# Patient Record
Sex: FEMALE | Race: WHITE | NOT HISPANIC OR LATINO | Employment: OTHER | URBAN - METROPOLITAN AREA
[De-identification: names, ages, dates, MRNs, and addresses within clinical notes are randomized per-mention and may not be internally consistent; named-entity substitution may affect disease eponyms.]

---

## 2023-09-25 LAB
ANION GAP IN SER/PLAS: 11 MMOL/L (ref 10–20)
CALCIUM (MG/DL) IN SER/PLAS: 10.1 MG/DL (ref 8.6–10.3)
CARBON DIOXIDE, TOTAL (MMOL/L) IN SER/PLAS: 27 MMOL/L (ref 21–32)
CHLORIDE (MMOL/L) IN SER/PLAS: 104 MMOL/L (ref 98–107)
CREATININE (MG/DL) IN SER/PLAS: 1.15 MG/DL (ref 0.5–1.05)
ERYTHROCYTE DISTRIBUTION WIDTH (RATIO) BY AUTOMATED COUNT: 13.1 % (ref 11.5–14.5)
ERYTHROCYTE MEAN CORPUSCULAR HEMOGLOBIN CONCENTRATION (G/DL) BY AUTOMATED: 32.1 G/DL (ref 32–36)
ERYTHROCYTE MEAN CORPUSCULAR VOLUME (FL) BY AUTOMATED COUNT: 94 FL (ref 80–100)
ERYTHROCYTES (10*6/UL) IN BLOOD BY AUTOMATED COUNT: 4.18 X10E12/L (ref 4–5.2)
GFR FEMALE: 48 ML/MIN/1.73M2
GLUCOSE (MG/DL) IN SER/PLAS: 110 MG/DL (ref 74–99)
HEMATOCRIT (%) IN BLOOD BY AUTOMATED COUNT: 39.3 % (ref 36–46)
HEMOGLOBIN (G/DL) IN BLOOD: 12.6 G/DL (ref 12–16)
LEUKOCYTES (10*3/UL) IN BLOOD BY AUTOMATED COUNT: 6.9 X10E9/L (ref 4.4–11.3)
NRBC (PER 100 WBCS) BY AUTOMATED COUNT: 0 /100 WBC (ref 0–0)
PLATELETS (10*3/UL) IN BLOOD AUTOMATED COUNT: 343 X10E9/L (ref 150–450)
POTASSIUM (MMOL/L) IN SER/PLAS: 4 MMOL/L (ref 3.5–5.3)
SODIUM (MMOL/L) IN SER/PLAS: 138 MMOL/L (ref 136–145)
UREA NITROGEN (MG/DL) IN SER/PLAS: 22 MG/DL (ref 6–23)

## 2023-10-03 ENCOUNTER — ANESTHESIA EVENT (OUTPATIENT)
Dept: OPERATING ROOM | Facility: HOSPITAL | Age: 83
End: 2023-10-03
Payer: MEDICARE

## 2023-10-03 ENCOUNTER — HOSPITAL ENCOUNTER (OUTPATIENT)
Facility: HOSPITAL | Age: 83
Setting detail: OBSERVATION
Discharge: NURSING, REHAB OR LONG-TERM CARE | End: 2023-10-07
Attending: ORTHOPAEDIC SURGERY | Admitting: ORTHOPAEDIC SURGERY
Payer: MEDICARE

## 2023-10-03 ENCOUNTER — ANESTHESIA (OUTPATIENT)
Dept: OPERATING ROOM | Facility: HOSPITAL | Age: 83
End: 2023-10-03
Payer: MEDICARE

## 2023-10-03 DIAGNOSIS — Z96.652 HISTORY OF LEFT KNEE REPLACEMENT: Primary | ICD-10-CM

## 2023-10-03 PROBLEM — M17.9 ARTHRITIS OF KNEE, DEGENERATIVE: Status: ACTIVE | Noted: 2023-10-03

## 2023-10-03 PROCEDURE — 2580000001 HC RX 258 IV SOLUTIONS: Performed by: ORTHOPAEDIC SURGERY

## 2023-10-03 PROCEDURE — A27447 PR TOTAL KNEE ARTHROPLASTY: Performed by: NURSE ANESTHETIST, CERTIFIED REGISTERED

## 2023-10-03 PROCEDURE — 2780000003 HC OR 278 NO HCPCS: Performed by: ORTHOPAEDIC SURGERY

## 2023-10-03 PROCEDURE — C1776 JOINT DEVICE (IMPLANTABLE): HCPCS | Performed by: ORTHOPAEDIC SURGERY

## 2023-10-03 PROCEDURE — 2500000004 HC RX 250 GENERAL PHARMACY W/ HCPCS (ALT 636 FOR OP/ED): Performed by: ORTHOPAEDIC SURGERY

## 2023-10-03 PROCEDURE — 2580000001 HC RX 258 IV SOLUTIONS: Performed by: NURSE ANESTHETIST, CERTIFIED REGISTERED

## 2023-10-03 PROCEDURE — 3600000010 HC OR TIME - EACH INCREMENTAL 1 MINUTE - PROCEDURE LEVEL FIVE: Performed by: ORTHOPAEDIC SURGERY

## 2023-10-03 PROCEDURE — G0378 HOSPITAL OBSERVATION PER HR: HCPCS

## 2023-10-03 PROCEDURE — 2500000004 HC RX 250 GENERAL PHARMACY W/ HCPCS (ALT 636 FOR OP/ED): Performed by: PHYSICIAN ASSISTANT

## 2023-10-03 PROCEDURE — 3700000001 HC GENERAL ANESTHESIA TIME - INITIAL BASE CHARGE: Performed by: ORTHOPAEDIC SURGERY

## 2023-10-03 PROCEDURE — A27447 PR TOTAL KNEE ARTHROPLASTY: Performed by: STUDENT IN AN ORGANIZED HEALTH CARE EDUCATION/TRAINING PROGRAM

## 2023-10-03 PROCEDURE — 2500000002 HC RX 250 W HCPCS SELF ADMINISTERED DRUGS (ALT 637 FOR MEDICARE OP, ALT 636 FOR OP/ED): Performed by: PHYSICIAN ASSISTANT

## 2023-10-03 PROCEDURE — 2500000004 HC RX 250 GENERAL PHARMACY W/ HCPCS (ALT 636 FOR OP/ED): Performed by: NURSE ANESTHETIST, CERTIFIED REGISTERED

## 2023-10-03 PROCEDURE — 2500000001 HC RX 250 WO HCPCS SELF ADMINISTERED DRUGS (ALT 637 FOR MEDICARE OP): Performed by: ORTHOPAEDIC SURGERY

## 2023-10-03 PROCEDURE — 94640 AIRWAY INHALATION TREATMENT: CPT

## 2023-10-03 PROCEDURE — 7100000001 HC RECOVERY ROOM TIME - INITIAL BASE CHARGE: Performed by: ORTHOPAEDIC SURGERY

## 2023-10-03 PROCEDURE — 2500000005 HC RX 250 GENERAL PHARMACY W/O HCPCS: Performed by: NURSE ANESTHETIST, CERTIFIED REGISTERED

## 2023-10-03 PROCEDURE — 99100 ANES PT EXTEME AGE<1 YR&>70: CPT | Performed by: STUDENT IN AN ORGANIZED HEALTH CARE EDUCATION/TRAINING PROGRAM

## 2023-10-03 PROCEDURE — 3700000002 HC GENERAL ANESTHESIA TIME - EACH INCREMENTAL 1 MINUTE: Performed by: ORTHOPAEDIC SURGERY

## 2023-10-03 PROCEDURE — 7100000002 HC RECOVERY ROOM TIME - EACH INCREMENTAL 1 MINUTE: Performed by: ORTHOPAEDIC SURGERY

## 2023-10-03 PROCEDURE — 2500000001 HC RX 250 WO HCPCS SELF ADMINISTERED DRUGS (ALT 637 FOR MEDICARE OP): Performed by: PHYSICIAN ASSISTANT

## 2023-10-03 PROCEDURE — 3600000005 HC OR TIME - INITIAL BASE CHARGE - PROCEDURE LEVEL FIVE: Performed by: ORTHOPAEDIC SURGERY

## 2023-10-03 PROCEDURE — 2720000007 HC OR 272 NO HCPCS: Performed by: ORTHOPAEDIC SURGERY

## 2023-10-03 DEVICE — TIBIAL COMPONENT
Type: IMPLANTABLE DEVICE | Site: KNEE | Status: FUNCTIONAL
Brand: TRIATHLON

## 2023-10-03 DEVICE — TIBIAL BEARING INSERT - CS
Type: IMPLANTABLE DEVICE | Site: KNEE | Status: FUNCTIONAL
Brand: TRIATHLON

## 2023-10-03 DEVICE — PATELLA
Type: IMPLANTABLE DEVICE | Site: KNEE | Status: FUNCTIONAL
Brand: TRIATHLON

## 2023-10-03 DEVICE — CRUCIATE RETAINING FEMORAL
Type: IMPLANTABLE DEVICE | Site: KNEE | Status: FUNCTIONAL
Brand: TRIATHLON

## 2023-10-03 RX ORDER — OXYCODONE HYDROCHLORIDE 5 MG/1
7.5 TABLET ORAL EVERY 6 HOURS PRN
Status: DISCONTINUED | OUTPATIENT
Start: 2023-10-03 | End: 2023-10-07 | Stop reason: HOSPADM

## 2023-10-03 RX ORDER — ALLOPURINOL 100 MG/1
100 TABLET ORAL DAILY
Status: DISCONTINUED | OUTPATIENT
Start: 2023-10-03 | End: 2023-10-07 | Stop reason: HOSPADM

## 2023-10-03 RX ORDER — FLUTICASONE FUROATE AND VILANTEROL 200; 25 UG/1; UG/1
1 POWDER RESPIRATORY (INHALATION) DAILY
Status: DISCONTINUED | OUTPATIENT
Start: 2023-10-03 | End: 2023-10-03

## 2023-10-03 RX ORDER — MECLIZINE HYDROCHLORIDE 25 MG/1
25 TABLET ORAL 3 TIMES DAILY PRN
COMMUNITY

## 2023-10-03 RX ORDER — MECLIZINE HYDROCHLORIDE 25 MG/1
25 TABLET ORAL 3 TIMES DAILY PRN
Status: DISCONTINUED | OUTPATIENT
Start: 2023-10-03 | End: 2023-10-07 | Stop reason: HOSPADM

## 2023-10-03 RX ORDER — SODIUM CHLORIDE, SODIUM LACTATE, POTASSIUM CHLORIDE, CALCIUM CHLORIDE 600; 310; 30; 20 MG/100ML; MG/100ML; MG/100ML; MG/100ML
100 INJECTION, SOLUTION INTRAVENOUS CONTINUOUS
Status: ACTIVE | OUTPATIENT
Start: 2023-10-03 | End: 2023-10-04

## 2023-10-03 RX ORDER — FORMOTEROL FUMARATE DIHYDRATE 20 UG/2ML
20 SOLUTION RESPIRATORY (INHALATION)
Status: DISCONTINUED | OUTPATIENT
Start: 2023-10-03 | End: 2023-10-07 | Stop reason: HOSPADM

## 2023-10-03 RX ORDER — CYCLOBENZAPRINE HCL 10 MG
10 TABLET ORAL 3 TIMES DAILY PRN
Status: DISCONTINUED | OUTPATIENT
Start: 2023-10-03 | End: 2023-10-07 | Stop reason: HOSPADM

## 2023-10-03 RX ORDER — METOCLOPRAMIDE HYDROCHLORIDE 5 MG/ML
10 INJECTION INTRAMUSCULAR; INTRAVENOUS ONCE AS NEEDED
Status: DISCONTINUED | OUTPATIENT
Start: 2023-10-03 | End: 2023-10-03 | Stop reason: HOSPADM

## 2023-10-03 RX ORDER — PANTOPRAZOLE SODIUM 40 MG/1
40 TABLET, DELAYED RELEASE ORAL DAILY
COMMUNITY

## 2023-10-03 RX ORDER — SODIUM CHLORIDE, SODIUM LACTATE, POTASSIUM CHLORIDE, CALCIUM CHLORIDE 600; 310; 30; 20 MG/100ML; MG/100ML; MG/100ML; MG/100ML
100 INJECTION, SOLUTION INTRAVENOUS CONTINUOUS
Status: DISCONTINUED | OUTPATIENT
Start: 2023-10-03 | End: 2023-10-03 | Stop reason: HOSPADM

## 2023-10-03 RX ORDER — ASPIRIN 81 MG/1
81 TABLET ORAL DAILY
COMMUNITY
End: 2023-10-07 | Stop reason: HOSPADM

## 2023-10-03 RX ORDER — OXYCODONE HYDROCHLORIDE 5 MG/1
5 TABLET ORAL EVERY 4 HOURS PRN
Status: DISCONTINUED | OUTPATIENT
Start: 2023-10-03 | End: 2023-10-03 | Stop reason: HOSPADM

## 2023-10-03 RX ORDER — GABAPENTIN 100 MG/1
100 CAPSULE ORAL NIGHTLY
COMMUNITY

## 2023-10-03 RX ORDER — METOPROLOL TARTRATE 25 MG/1
25 TABLET, FILM COATED ORAL 2 TIMES DAILY
Status: DISCONTINUED | OUTPATIENT
Start: 2023-10-03 | End: 2023-10-07 | Stop reason: HOSPADM

## 2023-10-03 RX ORDER — PHENYLEPHRINE HCL IN 0.9% NACL 1 MG/10 ML
SYRINGE (ML) INTRAVENOUS AS NEEDED
Status: DISCONTINUED | OUTPATIENT
Start: 2023-10-03 | End: 2023-10-03

## 2023-10-03 RX ORDER — METOPROLOL SUCCINATE 50 MG/1
50 TABLET, EXTENDED RELEASE ORAL NIGHTLY
Status: DISCONTINUED | OUTPATIENT
Start: 2023-10-03 | End: 2023-10-03

## 2023-10-03 RX ORDER — MEPERIDINE HYDROCHLORIDE 50 MG/ML
12.5 INJECTION INTRAMUSCULAR; INTRAVENOUS; SUBCUTANEOUS EVERY 10 MIN PRN
Status: DISCONTINUED | OUTPATIENT
Start: 2023-10-03 | End: 2023-10-03 | Stop reason: HOSPADM

## 2023-10-03 RX ORDER — METOPROLOL SUCCINATE 50 MG/1
50 TABLET, EXTENDED RELEASE ORAL NIGHTLY
COMMUNITY

## 2023-10-03 RX ORDER — ROSUVASTATIN CALCIUM 5 MG/1
5 TABLET, COATED ORAL NIGHTLY
COMMUNITY

## 2023-10-03 RX ORDER — POTASSIUM CHLORIDE 750 MG/1
10 CAPSULE, EXTENDED RELEASE ORAL DAILY
Status: DISCONTINUED | OUTPATIENT
Start: 2023-10-03 | End: 2023-10-07 | Stop reason: HOSPADM

## 2023-10-03 RX ORDER — PROPOFOL 10 MG/ML
INJECTION, EMULSION INTRAVENOUS CONTINUOUS PRN
Status: DISCONTINUED | OUTPATIENT
Start: 2023-10-03 | End: 2023-10-03

## 2023-10-03 RX ORDER — TIZANIDINE 2 MG/1
2 TABLET ORAL NIGHTLY
COMMUNITY
End: 2023-10-07 | Stop reason: HOSPADM

## 2023-10-03 RX ORDER — CELECOXIB 200 MG/1
200 CAPSULE ORAL ONCE
Status: COMPLETED | OUTPATIENT
Start: 2023-10-03 | End: 2023-10-03

## 2023-10-03 RX ORDER — POTASSIUM CHLORIDE 750 MG/1
10 TABLET, FILM COATED, EXTENDED RELEASE ORAL DAILY
COMMUNITY

## 2023-10-03 RX ORDER — POLYETHYLENE GLYCOL 3350 17 G/17G
17 POWDER, FOR SOLUTION ORAL DAILY
Status: DISCONTINUED | OUTPATIENT
Start: 2023-10-03 | End: 2023-10-07 | Stop reason: HOSPADM

## 2023-10-03 RX ORDER — ALLOPURINOL 100 MG/1
100 TABLET ORAL DAILY
COMMUNITY

## 2023-10-03 RX ORDER — OXYCODONE HYDROCHLORIDE 5 MG/1
2.5 TABLET ORAL EVERY 6 HOURS PRN
Status: DISCONTINUED | OUTPATIENT
Start: 2023-10-03 | End: 2023-10-07 | Stop reason: HOSPADM

## 2023-10-03 RX ORDER — ACETAMINOPHEN 325 MG/1
650 TABLET ORAL EVERY 6 HOURS SCHEDULED
Status: DISCONTINUED | OUTPATIENT
Start: 2023-10-03 | End: 2023-10-07 | Stop reason: HOSPADM

## 2023-10-03 RX ORDER — TRANEXAMIC ACID 650 MG/1
1950 TABLET ORAL ONCE
Status: COMPLETED | OUTPATIENT
Start: 2023-10-03 | End: 2023-10-03

## 2023-10-03 RX ORDER — LIDOCAINE HYDROCHLORIDE 20 MG/ML
INJECTION, SOLUTION INFILTRATION; PERINEURAL AS NEEDED
Status: DISCONTINUED | OUTPATIENT
Start: 2023-10-03 | End: 2023-10-03

## 2023-10-03 RX ORDER — NORETHINDRONE AND ETHINYL ESTRADIOL 0.5-0.035
KIT ORAL AS NEEDED
Status: DISCONTINUED | OUTPATIENT
Start: 2023-10-03 | End: 2023-10-03

## 2023-10-03 RX ORDER — MORPHINE SULFATE 2 MG/ML
2 INJECTION, SOLUTION INTRAMUSCULAR; INTRAVENOUS EVERY 4 HOURS PRN
Status: DISCONTINUED | OUTPATIENT
Start: 2023-10-03 | End: 2023-10-07 | Stop reason: HOSPADM

## 2023-10-03 RX ORDER — PANTOPRAZOLE SODIUM 40 MG/1
40 TABLET, DELAYED RELEASE ORAL DAILY
Status: DISCONTINUED | OUTPATIENT
Start: 2023-10-03 | End: 2023-10-07 | Stop reason: HOSPADM

## 2023-10-03 RX ORDER — DICYCLOMINE HYDROCHLORIDE 10 MG/1
10 CAPSULE ORAL 3 TIMES DAILY PRN
Status: DISCONTINUED | OUTPATIENT
Start: 2023-10-03 | End: 2023-10-07 | Stop reason: HOSPADM

## 2023-10-03 RX ORDER — ASPIRIN 81 MG/1
81 TABLET ORAL 2 TIMES DAILY
Status: DISCONTINUED | OUTPATIENT
Start: 2023-10-03 | End: 2023-10-07 | Stop reason: HOSPADM

## 2023-10-03 RX ORDER — NALOXONE HYDROCHLORIDE 0.4 MG/ML
0.2 INJECTION, SOLUTION INTRAMUSCULAR; INTRAVENOUS; SUBCUTANEOUS EVERY 5 MIN PRN
Status: DISCONTINUED | OUTPATIENT
Start: 2023-10-03 | End: 2023-10-07 | Stop reason: HOSPADM

## 2023-10-03 RX ORDER — CEFAZOLIN SODIUM 2 G/100ML
2 INJECTION, SOLUTION INTRAVENOUS ONCE
Status: COMPLETED | OUTPATIENT
Start: 2023-10-03 | End: 2023-10-03

## 2023-10-03 RX ORDER — CALCITRIOL 0.25 UG/1
0.25 CAPSULE ORAL DAILY
COMMUNITY
End: 2023-10-07 | Stop reason: HOSPADM

## 2023-10-03 RX ORDER — GABAPENTIN 100 MG/1
100 CAPSULE ORAL NIGHTLY
Status: DISCONTINUED | OUTPATIENT
Start: 2023-10-03 | End: 2023-10-07 | Stop reason: HOSPADM

## 2023-10-03 RX ORDER — AMLODIPINE BESYLATE 5 MG/1
5 TABLET ORAL DAILY
COMMUNITY

## 2023-10-03 RX ORDER — HYDROMORPHONE HYDROCHLORIDE 1 MG/ML
0.5 INJECTION, SOLUTION INTRAMUSCULAR; INTRAVENOUS; SUBCUTANEOUS EVERY 5 MIN PRN
Status: DISCONTINUED | OUTPATIENT
Start: 2023-10-03 | End: 2023-10-03 | Stop reason: HOSPADM

## 2023-10-03 RX ORDER — OXYCODONE HYDROCHLORIDE 5 MG/1
5 TABLET ORAL EVERY 6 HOURS PRN
Status: DISCONTINUED | OUTPATIENT
Start: 2023-10-03 | End: 2023-10-07 | Stop reason: HOSPADM

## 2023-10-03 RX ORDER — BUDESONIDE 0.5 MG/2ML
0.5 INHALANT ORAL
Status: DISCONTINUED | OUTPATIENT
Start: 2023-10-03 | End: 2023-10-07 | Stop reason: HOSPADM

## 2023-10-03 RX ORDER — FLUTICASONE FUROATE AND VILANTEROL 200; 25 UG/1; UG/1
1 POWDER RESPIRATORY (INHALATION) DAILY
COMMUNITY

## 2023-10-03 RX ORDER — LIDOCAINE HYDROCHLORIDE 10 MG/ML
0.1 INJECTION, SOLUTION EPIDURAL; INFILTRATION; INTRACAUDAL; PERINEURAL ONCE
Status: DISCONTINUED | OUTPATIENT
Start: 2023-10-03 | End: 2023-10-03 | Stop reason: HOSPADM

## 2023-10-03 RX ORDER — ONDANSETRON HYDROCHLORIDE 2 MG/ML
INJECTION, SOLUTION INTRAVENOUS AS NEEDED
Status: DISCONTINUED | OUTPATIENT
Start: 2023-10-03 | End: 2023-10-03

## 2023-10-03 RX ORDER — LABETALOL HYDROCHLORIDE 5 MG/ML
5 INJECTION, SOLUTION INTRAVENOUS ONCE AS NEEDED
Status: DISCONTINUED | OUTPATIENT
Start: 2023-10-03 | End: 2023-10-03 | Stop reason: HOSPADM

## 2023-10-03 RX ORDER — DICYCLOMINE HYDROCHLORIDE 10 MG/1
10 CAPSULE ORAL 3 TIMES DAILY PRN
COMMUNITY

## 2023-10-03 RX ORDER — AMLODIPINE BESYLATE 5 MG/1
5 TABLET ORAL DAILY
Status: DISCONTINUED | OUTPATIENT
Start: 2023-10-03 | End: 2023-10-07 | Stop reason: HOSPADM

## 2023-10-03 RX ORDER — CEFAZOLIN SODIUM 2 G/100ML
2 INJECTION, SOLUTION INTRAVENOUS EVERY 8 HOURS
Status: COMPLETED | OUTPATIENT
Start: 2023-10-03 | End: 2023-10-04

## 2023-10-03 RX ORDER — BUPIVACAINE HYDROCHLORIDE 7.5 MG/ML
INJECTION, SOLUTION EPIDURAL; RETROBULBAR AS NEEDED
Status: DISCONTINUED | OUTPATIENT
Start: 2023-10-03 | End: 2023-10-03

## 2023-10-03 RX ORDER — MIDAZOLAM HYDROCHLORIDE 1 MG/ML
0.5 INJECTION, SOLUTION INTRAMUSCULAR; INTRAVENOUS ONCE AS NEEDED
Status: DISCONTINUED | OUTPATIENT
Start: 2023-10-03 | End: 2023-10-03 | Stop reason: HOSPADM

## 2023-10-03 RX ORDER — ALBUTEROL SULFATE 0.83 MG/ML
2.5 SOLUTION RESPIRATORY (INHALATION) ONCE AS NEEDED
Status: DISCONTINUED | OUTPATIENT
Start: 2023-10-03 | End: 2023-10-03 | Stop reason: HOSPADM

## 2023-10-03 RX ORDER — ACETAMINOPHEN 325 MG/1
975 TABLET ORAL ONCE
Status: COMPLETED | OUTPATIENT
Start: 2023-10-03 | End: 2023-10-03

## 2023-10-03 RX ORDER — ROSUVASTATIN CALCIUM 5 MG/1
5 TABLET, COATED ORAL NIGHTLY
Status: DISCONTINUED | OUTPATIENT
Start: 2023-10-03 | End: 2023-10-07 | Stop reason: HOSPADM

## 2023-10-03 RX ADMIN — ALLOPURINOL 100 MG: 100 TABLET ORAL at 17:49

## 2023-10-03 RX ADMIN — OXYCODONE HYDROCHLORIDE 5 MG: 5 TABLET ORAL at 22:12

## 2023-10-03 RX ADMIN — SODIUM CHLORIDE, SODIUM LACTATE, POTASSIUM CHLORIDE, AND CALCIUM CHLORIDE: 600; 310; 30; 20 INJECTION, SOLUTION INTRAVENOUS at 09:01

## 2023-10-03 RX ADMIN — Medication 100 MCG: at 09:23

## 2023-10-03 RX ADMIN — ONDANSETRON 4 MG: 2 INJECTION INTRAMUSCULAR; INTRAVENOUS at 10:35

## 2023-10-03 RX ADMIN — ACETAMINOPHEN 975 MG: 325 TABLET, FILM COATED ORAL at 08:20

## 2023-10-03 RX ADMIN — EPHEDRINE SULFATE 10 MG: 50 INJECTION, SOLUTION INTRAVENOUS at 10:18

## 2023-10-03 RX ADMIN — ACETAMINOPHEN 325MG 650 MG: 325 TABLET ORAL at 23:59

## 2023-10-03 RX ADMIN — EPHEDRINE SULFATE 5 MG: 50 INJECTION, SOLUTION INTRAVENOUS at 09:49

## 2023-10-03 RX ADMIN — LIDOCAINE HYDROCHLORIDE 60 MG: 20 INJECTION, SOLUTION INFILTRATION; PERINEURAL at 09:11

## 2023-10-03 RX ADMIN — Medication 200 MCG: at 09:29

## 2023-10-03 RX ADMIN — TRANEXAMIC ACID 1950 MG: 650 TABLET ORAL at 08:20

## 2023-10-03 RX ADMIN — BUDESONIDE 0.5 MG: 0.5 INHALANT RESPIRATORY (INHALATION) at 20:21

## 2023-10-03 RX ADMIN — ROSUVASTATIN CALCIUM 5 MG: 5 TABLET, FILM COATED ORAL at 20:47

## 2023-10-03 RX ADMIN — CEFAZOLIN SODIUM 2 G: 2 INJECTION, SOLUTION INTRAVENOUS at 09:12

## 2023-10-03 RX ADMIN — DEXAMETHASONE SODIUM PHOSPHATE 4 MG: 4 INJECTION, SOLUTION INTRAMUSCULAR; INTRAVENOUS at 09:12

## 2023-10-03 RX ADMIN — ASPIRIN 81 MG: 81 TABLET, COATED ORAL at 20:47

## 2023-10-03 RX ADMIN — CEFAZOLIN SODIUM 2 G: 2 INJECTION, SOLUTION INTRAVENOUS at 17:50

## 2023-10-03 RX ADMIN — POTASSIUM CHLORIDE 10 MEQ: 750 CAPSULE, EXTENDED RELEASE ORAL at 17:49

## 2023-10-03 RX ADMIN — BUPIVACAINE HYDROCHLORIDE 1.6 ML: 7.5 INJECTION, SOLUTION EPIDURAL; RETROBULBAR at 09:10

## 2023-10-03 RX ADMIN — PROPOFOL 100 MCG/KG/MIN: 10 INJECTION, EMULSION INTRAVENOUS at 09:11

## 2023-10-03 RX ADMIN — EPHEDRINE SULFATE 10 MG: 50 INJECTION, SOLUTION INTRAVENOUS at 09:58

## 2023-10-03 RX ADMIN — FORMOTEROL FUMARATE 20 MCG: 20 SOLUTION RESPIRATORY (INHALATION) at 20:22

## 2023-10-03 RX ADMIN — CELECOXIB 200 MG: 200 CAPSULE ORAL at 08:20

## 2023-10-03 RX ADMIN — SODIUM CHLORIDE, POTASSIUM CHLORIDE, SODIUM LACTATE AND CALCIUM CHLORIDE 100 ML/HR: 600; 310; 30; 20 INJECTION, SOLUTION INTRAVENOUS at 22:15

## 2023-10-03 RX ADMIN — ACETAMINOPHEN 325MG 650 MG: 325 TABLET ORAL at 17:50

## 2023-10-03 RX ADMIN — METOPROLOL TARTRATE 25 MG: 25 TABLET, FILM COATED ORAL at 20:47

## 2023-10-03 RX ADMIN — GABAPENTIN 100 MG: 100 CAPSULE ORAL at 20:47

## 2023-10-03 RX ADMIN — Medication 200 MCG: at 09:43

## 2023-10-03 RX ADMIN — CYCLOBENZAPRINE 10 MG: 10 TABLET, FILM COATED ORAL at 22:13

## 2023-10-03 SDOH — SOCIAL STABILITY: SOCIAL INSECURITY: ARE YOU OR HAVE YOU BEEN THREATENED OR ABUSED PHYSICALLY, EMOTIONALLY, OR SEXUALLY BY ANYONE?: NO

## 2023-10-03 SDOH — SOCIAL STABILITY: SOCIAL INSECURITY: HAS ANYONE EVER THREATENED TO HURT YOUR FAMILY OR YOUR PETS?: NO

## 2023-10-03 SDOH — SOCIAL STABILITY: SOCIAL INSECURITY: WERE YOU ABLE TO COMPLETE ALL THE BEHAVIORAL HEALTH SCREENINGS?: YES

## 2023-10-03 SDOH — SOCIAL STABILITY: SOCIAL INSECURITY: ARE THERE ANY APPARENT SIGNS OF INJURIES/BEHAVIORS THAT COULD BE RELATED TO ABUSE/NEGLECT?: NO

## 2023-10-03 SDOH — SOCIAL STABILITY: SOCIAL INSECURITY: HAVE YOU HAD THOUGHTS OF HARMING ANYONE ELSE?: NO

## 2023-10-03 SDOH — SOCIAL STABILITY: SOCIAL INSECURITY: ABUSE: ADULT

## 2023-10-03 SDOH — SOCIAL STABILITY: SOCIAL INSECURITY: DO YOU FEEL UNSAFE GOING BACK TO THE PLACE WHERE YOU ARE LIVING?: NO

## 2023-10-03 SDOH — HEALTH STABILITY: MENTAL HEALTH: CURRENT SMOKER: 0

## 2023-10-03 SDOH — SOCIAL STABILITY: SOCIAL INSECURITY: DO YOU FEEL ANYONE HAS EXPLOITED OR TAKEN ADVANTAGE OF YOU FINANCIALLY OR OF YOUR PERSONAL PROPERTY?: NO

## 2023-10-03 SDOH — SOCIAL STABILITY: SOCIAL INSECURITY: DOES ANYONE TRY TO KEEP YOU FROM HAVING/CONTACTING OTHER FRIENDS OR DOING THINGS OUTSIDE YOUR HOME?: NO

## 2023-10-03 ASSESSMENT — PAIN SCALES - GENERAL
PAINLEVEL_OUTOF10: 5 - MODERATE PAIN
PAINLEVEL_OUTOF10: 2
PAIN_LEVEL: 0
PAINLEVEL_OUTOF10: 5 - MODERATE PAIN
PAINLEVEL_OUTOF10: 0 - NO PAIN
PAINLEVEL_OUTOF10: 2
PAINLEVEL_OUTOF10: 1
PAINLEVEL_OUTOF10: 2
PAINLEVEL_OUTOF10: 1
PAINLEVEL_OUTOF10: 0 - NO PAIN
PAINLEVEL_OUTOF10: 0 - NO PAIN
PAINLEVEL_OUTOF10: 2
PAINLEVEL_OUTOF10: 0 - NO PAIN
PAINLEVEL_OUTOF10: 2

## 2023-10-03 ASSESSMENT — COGNITIVE AND FUNCTIONAL STATUS - GENERAL
STANDING UP FROM CHAIR USING ARMS: A LITTLE
HELP NEEDED FOR BATHING: A LITTLE
TURNING FROM BACK TO SIDE WHILE IN FLAT BAD: A LITTLE
TURNING FROM BACK TO SIDE WHILE IN FLAT BAD: A LITTLE
PATIENT BASELINE BEDBOUND: NO
CLIMB 3 TO 5 STEPS WITH RAILING: A LITTLE
MOVING TO AND FROM BED TO CHAIR: A LITTLE
MOVING TO AND FROM BED TO CHAIR: A LITTLE
TOILETING: A LITTLE
STANDING UP FROM CHAIR USING ARMS: A LITTLE
MOBILITY SCORE: 18
WALKING IN HOSPITAL ROOM: A LITTLE
WALKING IN HOSPITAL ROOM: A LITTLE
MOVING FROM LYING ON BACK TO SITTING ON SIDE OF FLAT BED WITH BEDRAILS: A LITTLE
MOBILITY SCORE: 18
MOVING FROM LYING ON BACK TO SITTING ON SIDE OF FLAT BED WITH BEDRAILS: A LITTLE
DRESSING REGULAR LOWER BODY CLOTHING: A LITTLE
DAILY ACTIVITIY SCORE: 21
CLIMB 3 TO 5 STEPS WITH RAILING: A LITTLE

## 2023-10-03 ASSESSMENT — ACTIVITIES OF DAILY LIVING (ADL)
PATIENT'S MEMORY ADEQUATE TO SAFELY COMPLETE DAILY ACTIVITIES?: YES
JUDGMENT_ADEQUATE_SAFELY_COMPLETE_DAILY_ACTIVITIES: YES
TOILETING: INDEPENDENT
BATHING: INDEPENDENT
FEEDING YOURSELF: INDEPENDENT
DRESSING YOURSELF: INDEPENDENT
HEARING - RIGHT EAR: FUNCTIONAL
ASSISTIVE_DEVICE: HEARING AID - RIGHT;HEARING AID - LEFT;CANE
HEARING - LEFT EAR: FUNCTIONAL
WALKS IN HOME: INDEPENDENT
GROOMING: INDEPENDENT
ADEQUATE_TO_COMPLETE_ADL: YES

## 2023-10-03 ASSESSMENT — PAIN - FUNCTIONAL ASSESSMENT
PAIN_FUNCTIONAL_ASSESSMENT: 0-10

## 2023-10-03 ASSESSMENT — PATIENT HEALTH QUESTIONNAIRE - PHQ9
1. LITTLE INTEREST OR PLEASURE IN DOING THINGS: NOT AT ALL
2. FEELING DOWN, DEPRESSED OR HOPELESS: NOT AT ALL
SUM OF ALL RESPONSES TO PHQ9 QUESTIONS 1 & 2: 0

## 2023-10-03 ASSESSMENT — LIFESTYLE VARIABLES
AUDIT-C TOTAL SCORE: 0
PRESCIPTION_ABUSE_PAST_12_MONTHS: NO
AUDIT-C TOTAL SCORE: 0
HOW OFTEN DO YOU HAVE 6 OR MORE DRINKS ON ONE OCCASION: NEVER
HOW OFTEN DO YOU HAVE A DRINK CONTAINING ALCOHOL: NEVER
HOW MANY STANDARD DRINKS CONTAINING ALCOHOL DO YOU HAVE ON A TYPICAL DAY: PATIENT DOES NOT DRINK
SUBSTANCE_ABUSE_PAST_12_MONTHS: NO
SKIP TO QUESTIONS 9-10: 1

## 2023-10-03 ASSESSMENT — COLUMBIA-SUICIDE SEVERITY RATING SCALE - C-SSRS
1. IN THE PAST MONTH, HAVE YOU WISHED YOU WERE DEAD OR WISHED YOU COULD GO TO SLEEP AND NOT WAKE UP?: NO
2. HAVE YOU ACTUALLY HAD ANY THOUGHTS OF KILLING YOURSELF?: NO
6. HAVE YOU EVER DONE ANYTHING, STARTED TO DO ANYTHING, OR PREPARED TO DO ANYTHING TO END YOUR LIFE?: NO

## 2023-10-03 NOTE — NURSING NOTE
Pt arrived to floor at this time via bed.  Orientated to room and surroundings.  Call light within reach.

## 2023-10-03 NOTE — ANESTHESIA POSTPROCEDURE EVALUATION
Patient: Norma Villasenor    Procedure Summary       Date: 10/03/23 Room / Location: J OR 07 / [facility] OR    Anesthesia Start: 0902 Anesthesia Stop: 1118    Procedure: LEFT TOTAL KNEE REPLACEMENT (Left: Knee) Diagnosis:       Unilateral primary osteoarthritis, left knee      (Unilateral primary osteoarthritis, left knee [M17.12])    Surgeons: Ara Rashid MD Responsible Provider: Klever Frank DO    Anesthesia Type: spinal, regional, general ASA Status: 3            Anesthesia Type: No value filed.    Vitals Value Taken Time   /63 10/03/23 1122   Temp 36.3 °C (97.3 °F) 10/03/23 1122   Pulse 77 10/03/23 1122   Resp 16 10/03/23 1122   SpO2 93 % 10/03/23 1122       Anesthesia Post Evaluation    Patient location during evaluation: PACU  Patient participation: complete - patient participated  Level of consciousness: sleepy but conscious  Pain score: 0  Pain management: satisfactory to patient  Airway patency: patent  Cardiovascular status: acceptable and blood pressure returned to baseline  Respiratory status: acceptable  Hydration status: acceptable        No notable events documented.

## 2023-10-03 NOTE — ANESTHESIA PROCEDURE NOTES
Spinal Block    Patient location during procedure: OR  Start time: 10/3/2023 9:06 AM  End time: 10/3/2023 9:10 AM  Reason for block: primary anesthetic and at surgeon's request  Staffing  Performed: CRNA   Authorized by: Klever Frank DO    Performed by: AZ Delgado    Preanesthetic Checklist  Completed: patient identified, IV checked, risks and benefits discussed, surgical consent, monitors and equipment checked, pre-op evaluation, timeout performed and sterile techniques followed  Block Timeout  RN/Licensed healthcare professional reads aloud to the Anesthesia provider and entire team: Patient identity, procedure with side and site, patient position, and as applicable the availability of implants/special equipment/special requirements.  Patient on coagulant treatment: no  Timeout performed at: 10/3/2023 9:07 AM  Spinal Block  Patient position: sitting  Prep: ChloraPrep  Sterility prep: drape, gloves, mask, hand hygiene and cap  Sedation level: no sedation  Patient monitoring: blood pressure, continuous pulse oximetry and heart rate  Approach: midline  Vertebral space: L3-4  Injection technique: single-shot  Needle  Needle type: pencan.   Needle gauge: 24 G  Free flowing CSF: yes    Assessment  Sensory level: T10  Procedure assessment: patient tolerated procedure well with no immediate complications

## 2023-10-03 NOTE — OP NOTE
LEFT TOTAL KNEE REPLACEMENT (L) Operative Note     Date: 10/3/2023  OR Location: [facility] OR    Name: Norma Villasenor : 1940, Age: 82 y.o., MRN: [id number], Sex: female    Diagnosis  Pre-op Diagnosis     * Unilateral primary osteoarthritis, left knee [M17.12] Post-op Diagnosis     * Unilateral primary osteoarthritis, left knee [M17.12]     Procedures    * LEFT TOTAL KNEE REPLACEMENT    Surgeons      * Ara Rashid - Primary    Resident/Fellow/Other Assistant:  No surgical staff documented.    Procedure Summary  Anesthesia: General  ASA: ASA status not filed in the log.  Anesthesia Staff: Anesthesiologist: Klever Frank DO  CRNA: LORY Delgado-CRNA  Estimated Blood Loss: 50 mL  Intra-op Medications:   Medication Name Total Dose   ceFAZolin in dextrose (iso-os) (Ancef) IVPB 2 g 2 g              Anesthesia Record               Intraprocedure I/O Totals          Intake    Propofol Drip 0.00 mL    The total shown is the total volume documented since Anesthesia Start was filed.    Phenylephrine Drip 0.00 mL    The total shown is the total volume documented since Anesthesia Start was filed.    Total Intake 0 mL          Specimen: No specimens collected     Staff:   Circulator: Cecy Porter RN  Scrub Person: ; Molly Garces; Raquel Shipley         Drains and/or Catheters: * None in log *    Tourniquet Times:         Implants:  Implants       Type Name Action Serial No.      Joint PATELLA, TRIATHLON, ASYMMETRIC, SIZE A29 - [id number] Implanted      Joint COMPONENT, CR FEMORAL, P3, BEADED W/PA, LEFT - [id number] Implanted      Joint INSERT, TIBIAL, X3 TRIATHLON CS, SZ-2 9MM - [id number] Implanted      Joint BASEPLATE, TRIATHLON TRITANIUM, SIZE 2 - [id number] Implanted               Findings:     Indications: Norma Villasenor is an 82 y.o. female who is having surgery for Unilateral primary osteoarthritis, left knee [M17.12].     The patient was seen in the preoperative area. The risks,  benefits, complications, treatment options, non-operative alternatives, expected recovery and outcomes were discussed with the patient. The possibilities of reaction to medication, pulmonary aspiration, injury to surrounding structures, bleeding, recurrent infection, the need for additional procedures, failure to diagnose a condition, and creating a complication requiring transfusion or operation were discussed with the patient. The patient concurred with the proposed plan, giving informed consent.  The site of surgery was properly noted/marked if necessary per policy. The patient has been actively warmed in preoperative area. Preoperative antibiotics have been ordered and given within 2 hours of incision. Venous thrombosis prophylaxis have been ordered including bilateral sequential compression devices    Complications:  None; patient tolerated the procedure well.    Disposition: PACU - hemodynamically stable.  Condition: stable       Brief clinical note:    Patient presents status post failed treatment osteoarthritis of her knee.  The patient failed conservative measures.  These include injections activity modification therapy anti-inflammatories among others.  The patient presents today for operative fixation with a total knee arthroplasty.  The risks and benefits of surgery were inherent to the procedure were discussed in detail.  That includes osteolysis loosening or malalignment infection.  Other risks medical and anesthetic including DVT PE MI and stroke were also discussed.  Patient understanding these risks and the importance of rehab.  Patient wished to proceed.  The patient is consented and marked.  Patient did receive TXA orally preoperatively    Operative note:    Patient presents for a total knee arthroplasty today.  We will be doing the left side the patient presents to the operating room.  She underwent a spinal anesthetic which unfortunately in testing failed and therefore had a conversion to  general anesthetic.  The patient's position and prepped and draped in usual manner.  A final timeout is done with the operative team.  Esmarch exsanguination is done the tourniquet is inflated.  Of note the patient did receive antibiotic prior to the tourniquet inflation.    Longitudinal incision was carried out over the left knee.  A medial parapatellar approach is done is the flaps are elevated.  The fluids exsanguinated from the region and the patella is reflected the knee is flexed.  Osteophytes removed off the patella femur and the tibia which was quite extensive.  Attention was first toward the femur.  Intramedullary guide is then applied with appropriate rotation and settings.  This is then placed intramedullary and cuts are done taking minimal cut off the distal femur distally.  Once that cut was made area was measured and is elected to go with a size 3 initially.  The 5 way cutting guide was then applied an anterior posterior cuts and chamfer cuts were made.    Cuts were all completed and a trial was placed which gave good fit.  Attention was turned to the tibia.     The meniscus's were all removed.  Ostitis removed medially and laterally.  The ACL was removed and the two-pronged guide was applied.  This exposed the tibia nicely.  Using the extra medullary guide the p.o. guide was set to take minimal bone off the medial side which the low side.  This placed the appropriate varus and valgus position and the cut was made.  This was checked with the block and in good position.  For the debris was moved medially laterally.  The tibia was then sized with the osteophytes removed the skin down to a size 2 and the size 2 tibia was applied followed by trials for the poly.  This time once it was sized was like to go with a size 11 poly which was able give good stability both in flexion extension with good flexion and good extension.  The patient's bone quality was a satisfactory and therefore the drills were used  over the trial for the femur and the tibia was then finished for a press-fit size 3 tibia.  This was done with the block keel and subsequent 4-prong block.  Femur and tibia are now prepped completely prepared for the final implants.  Prior to doing such the patella was prepared.    Patella patella was measured in its depth.  This utilizing a sizing guide.  A freehand cut was then made in the patella to appropriate depth.  The sizing guide elected a size 29 patella.  Therefore the areas prepared with the 3 holes for the press-fit metal-backed patella from the David system.  A trial done with the patella then tracked very nicely without the need for lateral release.    The operative area washed out with pulsevac lavage and the press-fit components then applied in sequence.  This includes the tibia followed by the size 11 poly-, followed by the femur and finally the patella.  All final components tracked very nicely as the trials.  Areas washed out.   The parapatellar approach was then repaired in combination with Ethibond followed by strata fix at 30 degrees.  Skin was then reapproximated multiple layers with a final strata fix and Methyl-Plex layer.  Sterile dressings were applied.  Patient tolerated procedure well without complication.  Disposition with recovery room.  The patient will receive 2 additional dose of antibiotic.  The patient will also be placed on aspirin 81 mg twice daily for postoperative DVT prophylaxis.   A Tourniquet was put up at the beginning the case was let down prior to the dressings.      Ara Rashid  Phone Number: [phone]

## 2023-10-03 NOTE — ANESTHESIA PREPROCEDURE EVALUATION
Patient: Norma Villasenor    Procedure Information       Anesthesia Start Date/Time: 10/03/23 0902    Procedure: LEFT TOTAL KNEE REPLACEMENT (Left: Knee)    Location: [facility] OR 07 / [facility]    Surgeons: Ara Rashid MD            Relevant Problems   Musculoskeletal   (+) Arthritis of knee, degenerative       Clinical information reviewed:   Tobacco  Allergies  Meds   Med Hx  Surg Hx  OB Status  Fam Hx          NPO Detail:  NPO/Void Status  Date of Last Liquid: 10/02/23  Date of Last Solid: 10/02/23  Time of Last Void: 0819         Physical Exam    Airway  Mallampati: III  TM distance: >3 FB     Cardiovascular    Dental   (+) lower dentures, upper dentures     Pulmonary    Abdominal            Anesthesia Plan    ASA 3     spinal, regional and general   (TIVA)  The patient is not a current smoker.    intravenous induction   Anesthetic plan and risks discussed with patient.    Plan discussed with CRNA and attending.

## 2023-10-03 NOTE — CARE PLAN
The patient's goals for the shift include      The clinical goals for the shift include pain rating 3-5    Over the shift, the patient did not make progress toward the following goals. Barriers to progression include pain. Recommendations to address these barriers include administer pain medication.    Problem: Pain  Goal: Walks with improved pain control throughout the shift  Outcome: Not Progressing  Goal: Performs ADL's with improved pain control throughout shift  Outcome: Not Progressing  Goal: Participates in PT with improved pain control throughout the shift  Outcome: Not Progressing

## 2023-10-04 ENCOUNTER — HOME HEALTH ADMISSION (OUTPATIENT)
Dept: HOME HEALTH SERVICES | Facility: HOME HEALTH | Age: 83
End: 2023-10-04
Payer: MEDICARE

## 2023-10-04 PROBLEM — M17.10 ARTHRITIS OF KNEE: Status: ACTIVE | Noted: 2023-10-04

## 2023-10-04 PROBLEM — R53.1 WEAKNESS: Status: ACTIVE | Noted: 2023-10-04

## 2023-10-04 LAB
ANION GAP SERPL CALC-SCNC: 13 MMOL/L (ref 10–20)
BUN SERPL-MCNC: 19 MG/DL (ref 6–23)
CALCIUM SERPL-MCNC: 9.1 MG/DL (ref 8.6–10.3)
CHLORIDE SERPL-SCNC: 105 MMOL/L (ref 98–107)
CO2 SERPL-SCNC: 25 MMOL/L (ref 21–32)
CREAT SERPL-MCNC: 1.01 MG/DL (ref 0.5–1.05)
ERYTHROCYTE [DISTWIDTH] IN BLOOD BY AUTOMATED COUNT: 13.2 % (ref 11.5–14.5)
GFR SERPL CREATININE-BSD FRML MDRD: 56 ML/MIN/1.73M*2
GLUCOSE SERPL-MCNC: 133 MG/DL (ref 74–99)
HCT VFR BLD AUTO: 29.7 % (ref 36–46)
HGB BLD-MCNC: 9.6 G/DL (ref 12–16)
MCH RBC QN AUTO: 29.9 PG (ref 26–34)
MCHC RBC AUTO-ENTMCNC: 32.3 G/DL (ref 32–36)
MCV RBC AUTO: 93 FL (ref 80–100)
NRBC BLD-RTO: 0 /100 WBCS (ref 0–0)
PLATELET # BLD AUTO: 263 X10*3/UL (ref 150–450)
PMV BLD AUTO: 10 FL (ref 7.5–11.5)
POTASSIUM SERPL-SCNC: 3.6 MMOL/L (ref 3.5–5.3)
RBC # BLD AUTO: 3.21 X10*6/UL (ref 4–5.2)
SODIUM SERPL-SCNC: 139 MMOL/L (ref 136–145)
WBC # BLD AUTO: 12.7 X10*3/UL (ref 4.4–11.3)

## 2023-10-04 PROCEDURE — 2500000001 HC RX 250 WO HCPCS SELF ADMINISTERED DRUGS (ALT 637 FOR MEDICARE OP): Performed by: PHYSICIAN ASSISTANT

## 2023-10-04 PROCEDURE — 36415 COLL VENOUS BLD VENIPUNCTURE: CPT | Performed by: PHYSICIAN ASSISTANT

## 2023-10-04 PROCEDURE — 2500000004 HC RX 250 GENERAL PHARMACY W/ HCPCS (ALT 636 FOR OP/ED): Performed by: ORTHOPAEDIC SURGERY

## 2023-10-04 PROCEDURE — 2500000002 HC RX 250 W HCPCS SELF ADMINISTERED DRUGS (ALT 637 FOR MEDICARE OP, ALT 636 FOR OP/ED): Performed by: PHYSICIAN ASSISTANT

## 2023-10-04 PROCEDURE — 97165 OT EVAL LOW COMPLEX 30 MIN: CPT | Mod: GO | Performed by: OCCUPATIONAL THERAPIST

## 2023-10-04 PROCEDURE — 2580000001 HC RX 258 IV SOLUTIONS: Performed by: ORTHOPAEDIC SURGERY

## 2023-10-04 PROCEDURE — 36415 COLL VENOUS BLD VENIPUNCTURE: CPT | Performed by: ORTHOPAEDIC SURGERY

## 2023-10-04 PROCEDURE — 97161 PT EVAL LOW COMPLEX 20 MIN: CPT | Mod: GP

## 2023-10-04 PROCEDURE — 97110 THERAPEUTIC EXERCISES: CPT | Mod: GP

## 2023-10-04 PROCEDURE — G0378 HOSPITAL OBSERVATION PER HR: HCPCS

## 2023-10-04 PROCEDURE — 85027 COMPLETE CBC AUTOMATED: CPT | Performed by: ORTHOPAEDIC SURGERY

## 2023-10-04 PROCEDURE — 2500000004 HC RX 250 GENERAL PHARMACY W/ HCPCS (ALT 636 FOR OP/ED): Performed by: PHYSICIAN ASSISTANT

## 2023-10-04 PROCEDURE — 80048 BASIC METABOLIC PNL TOTAL CA: CPT | Performed by: PHYSICIAN ASSISTANT

## 2023-10-04 PROCEDURE — 94640 AIRWAY INHALATION TREATMENT: CPT

## 2023-10-04 PROCEDURE — 2500000001 HC RX 250 WO HCPCS SELF ADMINISTERED DRUGS (ALT 637 FOR MEDICARE OP): Performed by: ORTHOPAEDIC SURGERY

## 2023-10-04 PROCEDURE — 97116 GAIT TRAINING THERAPY: CPT | Mod: GP

## 2023-10-04 RX ADMIN — ASPIRIN 81 MG: 81 TABLET, COATED ORAL at 20:59

## 2023-10-04 RX ADMIN — ALLOPURINOL 100 MG: 100 TABLET ORAL at 09:28

## 2023-10-04 RX ADMIN — CEFAZOLIN SODIUM 2 G: 2 INJECTION, SOLUTION INTRAVENOUS at 00:00

## 2023-10-04 RX ADMIN — SODIUM CHLORIDE, POTASSIUM CHLORIDE, SODIUM LACTATE AND CALCIUM CHLORIDE 100 ML/HR: 600; 310; 30; 20 INJECTION, SOLUTION INTRAVENOUS at 09:45

## 2023-10-04 RX ADMIN — FORMOTEROL FUMARATE 20 MCG: 20 SOLUTION RESPIRATORY (INHALATION) at 19:57

## 2023-10-04 RX ADMIN — OXYCODONE HYDROCHLORIDE 5 MG: 5 TABLET ORAL at 09:27

## 2023-10-04 RX ADMIN — POTASSIUM CHLORIDE 10 MEQ: 750 CAPSULE, EXTENDED RELEASE ORAL at 09:28

## 2023-10-04 RX ADMIN — ACETAMINOPHEN 325MG 650 MG: 325 TABLET ORAL at 17:40

## 2023-10-04 RX ADMIN — ACETAMINOPHEN 325MG 650 MG: 325 TABLET ORAL at 12:06

## 2023-10-04 RX ADMIN — ASPIRIN 81 MG: 81 TABLET, COATED ORAL at 09:29

## 2023-10-04 RX ADMIN — ACETAMINOPHEN 325MG 650 MG: 325 TABLET ORAL at 05:12

## 2023-10-04 RX ADMIN — FORMOTEROL FUMARATE 20 MCG: 20 SOLUTION RESPIRATORY (INHALATION) at 08:16

## 2023-10-04 RX ADMIN — AMLODIPINE BESYLATE 5 MG: 5 TABLET ORAL at 09:28

## 2023-10-04 RX ADMIN — BUDESONIDE 0.5 MG: 0.5 INHALANT RESPIRATORY (INHALATION) at 19:58

## 2023-10-04 RX ADMIN — BUDESONIDE 0.5 MG: 0.5 INHALANT RESPIRATORY (INHALATION) at 08:16

## 2023-10-04 RX ADMIN — ROSUVASTATIN CALCIUM 5 MG: 5 TABLET, FILM COATED ORAL at 20:59

## 2023-10-04 RX ADMIN — GABAPENTIN 100 MG: 100 CAPSULE ORAL at 20:59

## 2023-10-04 RX ADMIN — OXYCODONE HYDROCHLORIDE 5 MG: 5 TABLET ORAL at 17:40

## 2023-10-04 RX ADMIN — PANTOPRAZOLE SODIUM 40 MG: 40 TABLET, DELAYED RELEASE ORAL at 09:29

## 2023-10-04 RX ADMIN — POLYETHYLENE GLYCOL 3350 17 G: 17 POWDER, FOR SOLUTION ORAL at 09:29

## 2023-10-04 RX ADMIN — METOPROLOL TARTRATE 25 MG: 25 TABLET, FILM COATED ORAL at 20:59

## 2023-10-04 RX ADMIN — METOPROLOL TARTRATE 25 MG: 25 TABLET, FILM COATED ORAL at 09:28

## 2023-10-04 ASSESSMENT — COGNITIVE AND FUNCTIONAL STATUS - GENERAL
CLIMB 3 TO 5 STEPS WITH RAILING: A LOT
STANDING UP FROM CHAIR USING ARMS: A LITTLE
HELP NEEDED FOR BATHING: A LITTLE
WALKING IN HOSPITAL ROOM: A LITTLE
WALKING IN HOSPITAL ROOM: A LITTLE
MOVING FROM LYING ON BACK TO SITTING ON SIDE OF FLAT BED WITH BEDRAILS: A LITTLE
MOVING FROM LYING ON BACK TO SITTING ON SIDE OF FLAT BED WITH BEDRAILS: A LITTLE
HELP NEEDED FOR BATHING: A LITTLE
STANDING UP FROM CHAIR USING ARMS: A LITTLE
DRESSING REGULAR LOWER BODY CLOTHING: A LITTLE
TOILETING: A LITTLE
DRESSING REGULAR LOWER BODY CLOTHING: A LITTLE
MOVING FROM LYING ON BACK TO SITTING ON SIDE OF FLAT BED WITH BEDRAILS: A LITTLE
TURNING FROM BACK TO SIDE WHILE IN FLAT BAD: A LITTLE
DRESSING REGULAR LOWER BODY CLOTHING: A LITTLE
MOVING TO AND FROM BED TO CHAIR: A LITTLE
TOILETING: A LITTLE
CLIMB 3 TO 5 STEPS WITH RAILING: A LOT
TURNING FROM BACK TO SIDE WHILE IN FLAT BAD: A LITTLE
WALKING IN HOSPITAL ROOM: A LITTLE
DRESSING REGULAR UPPER BODY CLOTHING: A LITTLE
MOVING TO AND FROM BED TO CHAIR: A LITTLE
DAILY ACTIVITIY SCORE: 20
TURNING FROM BACK TO SIDE WHILE IN FLAT BAD: A LITTLE
MOBILITY SCORE: 17
PERSONAL GROOMING: A LITTLE
MOBILITY SCORE: 17
DAILY ACTIVITIY SCORE: 21
MOBILITY SCORE: 17
MOBILITY SCORE: 17
DAILY ACTIVITIY SCORE: 19
TURNING FROM BACK TO SIDE WHILE IN FLAT BAD: A LITTLE
WALKING IN HOSPITAL ROOM: A LITTLE
STANDING UP FROM CHAIR USING ARMS: A LITTLE
DRESSING REGULAR UPPER BODY CLOTHING: A LITTLE
STANDING UP FROM CHAIR USING ARMS: A LITTLE
HELP NEEDED FOR BATHING: A LITTLE
TOILETING: A LITTLE
MOVING TO AND FROM BED TO CHAIR: A LITTLE
MOVING FROM LYING ON BACK TO SITTING ON SIDE OF FLAT BED WITH BEDRAILS: A LITTLE
MOVING TO AND FROM BED TO CHAIR: A LITTLE

## 2023-10-04 ASSESSMENT — PAIN DESCRIPTION - DESCRIPTORS: DESCRIPTORS: ACHING;DULL

## 2023-10-04 ASSESSMENT — PAIN SCALES - GENERAL
PAINLEVEL_OUTOF10: 3
PAINLEVEL_OUTOF10: 5 - MODERATE PAIN
PAINLEVEL_OUTOF10: 5 - MODERATE PAIN
PAINLEVEL_OUTOF10: 8
PAINLEVEL_OUTOF10: 0 - NO PAIN
PAINLEVEL_OUTOF10: 5 - MODERATE PAIN
PAINLEVEL_OUTOF10: 3

## 2023-10-04 ASSESSMENT — PAIN - FUNCTIONAL ASSESSMENT
PAIN_FUNCTIONAL_ASSESSMENT: 0-10

## 2023-10-04 ASSESSMENT — ACTIVITIES OF DAILY LIVING (ADL): ADL_ASSISTANCE: INDEPENDENT

## 2023-10-04 NOTE — PROGRESS NOTES
Occupational Therapy    Evaluation/Treatment    Patient Name: Norma Villasenor  MRN: [id number]  : 1940  Today's Date: 10/04/23  Time Calculation  Start Time: 1013  Stop Time: 1041  Time Calculation (min): 28 min       Assessment:  OT Assessment: Patient presents with decline in ADLs, functional transfers, functional mobility and would benefit from OT during acute stay to maximize functional independence and safety. Recommend moderate intensity OT after acute hospital stay to maximize functional independence and safety  Prognosis: Good  Evaluation/Treatment Tolerance: Patient tolerated treatment well  Medical Staff Made Aware: Yes  OT Assessment Results: Decreased ADL status, Decreased functional mobility, Decreased safe judgment during ADL  Prognosis: Good  Evaluation/Treatment Tolerance: Patient tolerated treatment well  Medical Staff Made Aware: Yes  Strengths: Ability to acquire knowledge    Plan:  Treatment Interventions: ADL retraining, Endurance training, Patient/family training, Equipment evaluation/education, Compensatory technique education  OT Frequency: 1 time per day until discharge  OT Discharge Recommendations: Moderate intensity level of continued care  Treatment Interventions: ADL retraining, Endurance training, Patient/family training, Equipment evaluation/education, Compensatory technique education  Subjective     Current Problem:  1. History of left knee replacement  Referral to Home Health        Past Medical History:   Diagnosis Date    GERD (gastroesophageal reflux disease)     Hypertension      Past Surgical History:   Procedure Laterality Date    CHOLECYSTECTOMY      COLONOSCOPY      CORONARY STENT PLACEMENT      OTHER SURGICAL HISTORY      UPPER GASTROINTESTINAL ENDOSCOPY         General:   OT Received On: 10/03/23  General  Reason for Referral: left TKR  Referred By: Ara Rashid MD  Past Medical History Relevant to Rehab: DM II, HTN, CAD, COPD, HLD  Prior to Session  Communication: Bedside nurse  Patient Position Received: Bed, 2 rail up  Preferred Learning Style: verbal  General Comment: RN cleared patient to work with therapy. Patient in long legged sitting in bed and agreeable to participate in OT evaluation.    Precautions:  LE Weight Bearing Status: Weight Bearing as Tolerated (left LE)  Medical Precautions: Fall precautions    Pain:  Pain Assessment  Pain Assessment: 0-10  Pain Score: 3  Pain Location: Knee  Pain Interventions: Rest, Medication (See MAR)  Objective     Cognition:  Overall Cognitive Status: Within Functional Limits  Mildly impulsive   Patient is hard of hearing     Home Living:  Home Living Comments: Patient lives in [facility] with daughter in law.  Reports 9 steps with railing to main living area. Has tub shower with GB.    Prior Function:  Prior Function Comments: Was independent wtih all ADLs, IADLs and functional mobility tasks.  Has WW and cane.      ADL's:  LE Dressing Assistance: Moderate  LE Dressing Deficit: Steadying, Requires assistive device for steadying, Verbal cueing, Supervision/safety  ADL Comments: educated patient on safety and compensatory strategies for lower body dressing. Patient would benefit from further instruction in safety and compensatory strategies for lower body dressing.  Educated patient on use of AE for lower body dressing.  patient is hard of hearing.    Activity Tolerance:  Endurance: Endurance does not limit participation in activity      Bed Mobility/Transfers: Bed Mobility  Bed Mobility:  (Min assist supine to sit at edge of bed with assist.)  Transfers  Transfer:  (Min assist sit <> stand from multiple surfaces. Max verbal cues for proper hand placement and technique)  Patient with left knee buckling when taking steps. Max verbal cues provided for WW management.  Educated patient on safety with car transfers and patient verbalized understanding.    Patient remained seated in bedside chair with call light, phone  and tray table in reach. Chair check engaged for safety.      Vision:Vision - Basic Assessment  Current Vision: No visual deficits      Sensation:  Light Touch: No apparent deficits    Extremities: RUE   RUE : Within Functional Limits and LUE   LUE: Within Functional Limits    Outcome Measures: [facility] Daily Activity  Putting on and taking off regular lower body clothing: A little  Bathing (including washing, rinsing, drying): A little  Putting on and taking off regular upper body clothing: A little  Toileting, which includes using toilet, bedpan or urinal: A little  Taking care of personal grooming such as brushing teeth: A little  Eating Meals: None  Daily Activity - Total Score: 19    Education Documentation  Body Mechanics, taught by Irish Gonzalez OT at 10/4/2023  2:04 PM.  Learner: Patient  Readiness: Acceptance  Method: Explanation  Response: Verbalizes Understanding    Precautions, taught by Irish Gonzalez OT at 10/4/2023  2:04 PM.  Learner: Patient  Readiness: Acceptance  Method: Explanation  Response: Verbalizes Understanding    ADL Training, taught by Irish Gonzalez OT at 10/4/2023  2:04 PM.  Learner: Patient  Readiness: Acceptance  Method: Explanation  Response: Verbalizes Understanding    Education Comments  No comments found.        EDUCATION:  Education  Individual(s) Educated: Patient  Education Provided: Fall precautons, Risk and benefits of OT discussed with patient or other, POC discussed and agreed upon  Risk and Benefits Discussed with Patient/Caregiver/Other: yes  Patient/Caregiver Demonstrated Understanding: yes  Plan of Care Discussed and Agreed Upon: yes  Patient Response to Education: Patient/Caregiver Verbalized Understanding of Information    Goals:  Encounter Problems       Encounter Problems (Active)       Balance       STG-Patient will be supervision with assistive device dynamic stand task >5 minutes for ADL completion  (Progressing)       Start:  10/04/23    Expected End:   10/18/23               Dressings Lower Extremities       STG - Patient will complete lower body dressing with SBA with compensatory strategies and AE (Progressing)       Start:  10/04/23    Expected End:  10/18/23               Mobility       STG-Patient will be SBA with assistive device functional mobility tasks  (Progressing)       Start:  10/04/23    Expected End:  10/18/23               Transfers       STG-Patient will be SBA with functional transfers  (Progressing)       Start:  10/04/23    Expected End:  10/18/23

## 2023-10-04 NOTE — DISCHARGE INSTR - OTHER ORDERS
Call Dr. Rashid for any problems and/or concerns.  *Weight bearing as tolerated  *Maintain knee precautions  *Remove surgical dressing post op day #7 and leave open to air.  *No pillow under affected knee  *Raise (elevate) your leg above the level of your heart while you are sitting or lying down (place pillow underneath calf)  *You may shower, do not soak or scrub incision; pat dry  *If you have a polar care cooling device, use as instructed; otherwise  Apply ice to affected knee as needed to minimize swelling, on 20 minutes, off 20 minutes  *Move your toes often to avoid stiffness and to lessen swelling  *Avoid sitting for a long time without moving. Get up to take short walks every 1-2 hours. This is important to improve blood flow and breathing. Ask for help if you feel weak or unsteady  *Continue the coughing and deep breathing exercises that you learned in the hospital    Call Doctor right away for:  *Fever of 100.4 or above, shaking chills  *Stiffness, or inability to move the knee  *Increased swelling in your leg  *Increased redness, tenderness, or swelling in or around the knee incision  *Drainage from the knee incision  *Increased knee pain  *An incision that breaks open  *Chest pain/shortness of breath-call 911    After the procedure you can expect pain, swelling, and limited range of motion    Narcotic pain medication may cause constipation. You may need to take actions to prevent or treat constipation, such as:  -drink enough fluid to keep your urine pale yellow  -take over-the-counter or prescription medicines  -eat foods that are high in fiber, such as beans, whole grains, and fresh fruits and vegetables  -limit foods that are high in fat and processed sugars, such as fried or sweet foods    Take your blood thinner (anticoagulant) as prescribed by your physician to prevent blood clots.   If your physician prescribed JEANNIE Hose (compression stockings)-wear as instructed as they will help reduce  swelling and the formation of blood clots    Do not use any products that contain nicotine or tobacco, such as cigarettes, e-cigarettes, and chewing tobacco. These can delay healing after surgery. If you need help quitting, ask your health care provider

## 2023-10-04 NOTE — PROGRESS NOTES
Physical Therapy    Physical Therapy    Physical Therapy Evaluation    Patient Name: Norma Villasenor  MRN: [id number]  Today's Date: 10/4/2023        Assessment/Plan   PT Assessment  PT Assessment Results: Decreased strength, Decreased range of motion, Decreased endurance  Rehab Prognosis: Good  Evaluation/Treatment Tolerance: Patient tolerated treatment well  Medical Staff Made Aware: Yes  End of Session Communication: Bedside nurse  Assessment Comment: Pt presents today below baseline level of function and requires continued PT during hospital stay. Pt requires PT at a low intensity level at discharge to maximize functional mobility and safety.    End of Session Patient Position: Up in chair  IP OR SWING BED PT PLAN  Inpatient or Swing Bed: Inpatient  PT Plan  Treatment/Interventions: Bed mobility, Transfer training, Gait training, Stair training, Balance training, Strengthening, Therapeutic exercise, Therapeutic activity, Endurance training, Home exercise program, Neuromuscular re-education, Range of motion  PT Plan: Skilled PT  PT Frequency: BID  PT Discharge Recommendations: Moderate intensity level of continued care  PT Recommended Transfer Status: Assist x1    Subjective     Current Problem:  1. History of left knee replacement  Referral to Home Health        Past Medical History:   Diagnosis Date    GERD (gastroesophageal reflux disease)     Hypertension      Past Surgical History:   Procedure Laterality Date    CHOLECYSTECTOMY      COLONOSCOPY      CORONARY STENT PLACEMENT      OTHER SURGICAL HISTORY      UPPER GASTROINTESTINAL ENDOSCOPY         General Visit Information:  General  Reason for Referral: left TKA  Referred By: Dr. Ara Rashid  Past Medical History Relevant to Rehab: Pt is POD 1 s/p left TKA.   Family/Caregiver Present: No  Prior to Session Communication: Bedside nurse  Patient Position Received:  (ambulating in room with OT)  Preferred Learning Style: verbal  General Comment: Pt agreeable  to PT, nursing cleared for treatment. pt is [facility].    Home Living:  Home Living  Type of Home: House  Lives With: Adult children  Home Adaptive Equipment: Walker rolling or standard, Cane  Home Layout:  (split leve)  Home Access: Stairs to enter with rails  Entrance Stairs-Number of Steps: 9  Bathroom Shower/Tub: Walk-in shower  Home Living Comments: Pt will be alone during the day.    Prior Level of Function:  Prior Function Per Pt/Caregiver Report  Level of [place]: Independent with ADLs and functional transfers  ADL Assistance: Independent  Ambulatory Assistance: Independent    Precautions:  Precautions  LE Weight Bearing Status: Weight Bearing as Tolerated  Medical Precautions: Fall precautions  Post-Surgical Precautions: Left total knee precautions         Objective     Pain:  Pain Assessment  Pain Assessment: 0-10  Pain Score: 8  Pain Location: Knee  Pain Orientation: Left  Pain Interventions: Repositioned, Elevated    Cognition:  Cognition  Overall Cognitive Status: Within Functional Limits    General Assessments:      Static Sitting Balance  Static Sitting-Comment/Number of Minutes: good  Dynamic Sitting Balance  Dynamic Sitting-Comments: good  Static Standing Balance  Static Standing-Comment/Number of Minutes: fair  Dynamic Standing Balance  Dynamic Standing-Comments: fair    Functional Assessments:        Transfers  Transfer: Yes  Transfer 1  Transfer From 1: Sit to  Transfer to 1: Stand  Transfer Device 1: Walker, Gait belt  Transfer Level of Assistance 1: Minimum assistance  Trials/Comments 1: Cues for safe hand placement with use of FWW for sit<>stand.    Transfers 2  Transfer From 2: Stand to  Transfer to 2: Sit  Transfer Device 2: Walker  Ambulation/Gait Training  Ambulation/Gait Training Performed: Yes  Ambulation/Gait Training 1  Device 1: Rolling walker  Gait Support Devices: Gait belt  Assistance 1: Contact guard  Quality of Gait 1:  (step to gait. antalgic wiht mild buckling of left  knee)  Comments/Distance (ft) 1: 10 feet with chair follow for safety. Instruction provided in step to gait pattern with cues given for sequencing with FWW and increased use of UE's. Cues given for turning strategy to avoid pivoting on left LE.             Extremity/Trunk Assessments:        AROM RLE (degrees)  RLE AROM Comment: WFL  Strength RLE  RLE Overall Strength: Greater than or equal to 3/5 as evidenced by functional mobility  AROM LLE (degrees)  LLE AROM Comment: WFL except knee impaired  L Knee Flexion 0-130: 60  L Knee Extension 0-130:  (15 from 0)  Strength LLE  LLE Overall Strength:  (left LE at least 3/5 throughout except knee 3-/5)    Outcome Measures:     [facility] Basic Mobility  Turning from your back to your side while in a flat bed without using bedrails: A little  Moving from lying on your back to sitting on the side of a flat bed without using bedrails: A little  Moving to and from bed to chair (including a wheelchair): A little  Standing up from a chair using your arms (e.g. wheelchair or bedside chair): A little  To walk in hospital room: A little  Climbing 3-5 steps with railing: A lot  Basic Mobility - Total Score: 17  Encounter Problems       Encounter Problems (Active)       PT Problem       Pt will perform bed mobility with mod I.   (Progressing)       Start:  10/04/23    Expected End:  10/18/23            Pt will complete sit <> stand and bed <> chair transfers with mod I.   (Progressing)       Start:  10/04/23    Expected End:  10/18/23            Pt will ambulate 300 feet mod I with no significant gait deviations.   (Progressing)       Start:  10/04/23    Expected End:  10/18/23            Pt will verbalize and demonstrate understanding of TKA supine/seated exercises.  (Progressing)       Start:  10/04/23    Expected End:  10/18/23            Pt will ascend/descend at least 9 stairs using rail and cane in order to navigate home environment.   (Progressing)       Start:  10/04/23     Expected End:  10/18/23                 Education Documentation  Precautions, taught by Amalia Larson, PT at 10/4/2023  1:20 PM.  Learner: Patient  Readiness: Acceptance  Method: Explanation  Response: Needs Reinforcement    Body Mechanics, taught by Amalia Larson, PT at 10/4/2023  1:20 PM.  Learner: Patient  Readiness: Acceptance  Method: Explanation  Response: Needs Reinforcement    Home Exercise Program, taught by Amalia Larson PT at 10/4/2023  1:20 PM.  Learner: Patient  Readiness: Acceptance  Method: Explanation  Response: Needs Reinforcement    Mobility Training, taught by Amalia Larson, PT at 10/4/2023  1:20 PM.  Learner: Patient  Readiness: Acceptance  Method: Explanation  Response: Needs Reinforcement    Education Comments  No comments found.

## 2023-10-04 NOTE — PROGRESS NOTES
Physical Therapy    Physical Therapy Treatment    Patient Name: Norma Villasenor  MRN: [id number]  Today's Date: 10/4/2023  Time Calculation  Start Time: 1217  Stop Time: 1247  Time Calculation (min): 30 min       Assessment/Plan   PT Assessment  PT Assessment Results: Decreased strength, Decreased range of motion, Decreased endurance  End of Session Patient Position: Up in chair  PT Plan  Inpatient/Swing Bed or Outpatient: Inpatient         General Visit Information:   PT  Visit  PT Received On: 10/04/23  General  Reason for Referral: TKR    Subjective   Precautions:  Precautions  LE Weight Bearing Status: Weight Bearing as Tolerated  Vital Signs:       Objective   Pain:  Pain Assessment  Pain Assessment: 0-10  Pain Score: 5 - Moderate pain (during gait)  Pain Type: Surgical pain  Pain Location: Knee  Pain Interventions: Cold applied  Cognition:  Cognition  Overall Cognitive Status: Within Functional Limits  Postural Control:     Extremity/Trunk Assessments:    Activity Tolerance:     Treatments:  Therapeutic Exercise  Therapeutic Exercise Performed: Yes  Therapeutic Exercise Activity 1: AP,QS,GS,SLR,HIP ABD, MARCHING,BALL SQUEEZES X 20 B WITH EMPHASIS ON OPERATED LE  Therapeutic Exercise Activity 2: ROM         Ambulation/Gait Training  Ambulation/Gait Training Performed: Yes  Ambulation/Gait Training 1  Surface 1: Level tile  Device 1: Rolling walker  Gait Support Devices: Gait belt  Assistance 1: Contact guard  Quality of Gait 1:  (antalgic gait with step to pattern)  Transfers  Transfer: Yes  Transfer 1  Transfer From 1: Sit to  Transfer to 1: Stand  Transfer Level of Assistance 1: Contact guard    Outcome Measures:  [facility] Basic Mobility  Turning from your back to your side while in a flat bed without using bedrails: A little  Moving from lying on your back to sitting on the side of a flat bed without using bedrails: A little  Moving to and from bed to chair (including a wheelchair): A little  Standing up from a  chair using your arms (e.g. wheelchair or bedside chair): A little  To walk in hospital room: A little  Climbing 3-5 steps with railing: A lot  Basic Mobility - Total Score: 17    Education Documentation  No documentation found.  Education Comments  No comments found.        OP EDUCATION:       Encounter Problems       Encounter Problems (Active)       Pain - Adult

## 2023-10-04 NOTE — DISCHARGE INSTR - ACTIVITY
Falls precautions; bed/chair alarm if applicable, non-skid socks, room/living space free of clutter (fall-proof your home), belongings within reach, be aware of side effects of current medications, get enough rest, stand of slowly

## 2023-10-04 NOTE — PROGRESS NOTES
"Norma Villasenor is a 82 y.o. female on day 0 of admission presenting with Arthritis of knee, degenerative.    Subjective   Patient comes in status post a total knee arthroplasty done yesterday.  The patient is doing relatively well she has pain in the knee when she moves it but outside of that she is comfortable at bedside.  She has no chest pain shortness of breath or any dietary issues.       Objective     Physical Exam    Patient has a intact dressing intact.  The patient has little pain with the range of motion.  She is obviously stiff status post total knee arthroplasty.  But she has negative Homans' sign and any ambulatory aid on symmetrical swelling of her left lower extremity.  Patient can flex and extend the toes.    Last Recorded Vitals  Blood pressure 135/74, pulse 75, temperature 36.4 °C (97.5 °F), temperature source Temporal, resp. rate 16, height 1.499 m (4' 11.02\"), weight 58.9 kg (129 lb 13.6 oz), SpO2 92 %.  Intake/Output last 3 Shifts:  I/O last 3 completed shifts:  In: 2731.2 (46.4 mL/kg) [P.O.:1200; I.V.:631.2 (10.7 mL/kg); IV Piggyback:900]  Out: 350 (5.9 mL/kg) [Urine:300 (0.1 mL/kg/hr); Blood:50]  Weight: 58.9 kg     Relevant Results      Scheduled medications  acetaminophen, 650 mg, oral, q6h MARIA DEL CARMEN  allopurinol, 100 mg, oral, Daily  amLODIPine, 5 mg, oral, Daily  aspirin, 81 mg, oral, BID  budesonide, 0.5 mg, nebulization, BID  formoterol, 20 mcg, nebulization, BID  gabapentin, 100 mg, oral, Nightly  metoprolol tartrate, 25 mg, oral, BID  pantoprazole, 40 mg, oral, Daily  polyethylene glycol, 17 g, oral, Daily  potassium chloride ER, 10 mEq, oral, Daily  rosuvastatin, 5 mg, oral, Nightly      Continuous medications  lactated Ringer's, 100 mL/hr, Last Rate: 100 mL/hr (10/03/23 2215)  oxygen, 2 L/min, Last Rate: Stopped (10/03/23 1545)      PRN medications  PRN medications: cyclobenzaprine, dicyclomine, meclizine, morphine, naloxone, naloxone, oxyCODONE, oxyCODONE, oxyCODONE  Results for orders " placed or performed during the hospital encounter of 10/03/23 (from the past 24 hour(s))   CBC   Result Value Ref Range    WBC 12.7 (H) 4.4 - 11.3 x10*3/uL    nRBC 0.0 0.0 - 0.0 /100 WBCs    RBC 3.21 (L) 4.00 - 5.20 x10*6/uL    Hemoglobin 9.6 (L) 12.0 - 16.0 g/dL    Hematocrit 29.7 (L) 36.0 - 46.0 %    MCV 93 80 - 100 fL    MCH 29.9 26.0 - 34.0 pg    MCHC 32.3 32.0 - 36.0 g/dL    RDW 13.2 11.5 - 14.5 %    Platelets 263 150 - 450 x10*3/uL    MPV 10.0 7.5 - 11.5 fL                 Assessment/Plan   Principal Problem:    Arthritis of knee, degenerative  Active Problems:    Arthritis of knee    Patient is status post total knee arthroplasty on the left.  The patient will be evaluated with PT and OT today.  If she is okay with that and does well the patient may be discharged to home with home care.  Patient will follow-up in the office with myself in 2 weeks.  Discharge will be done through the Ortho PA.       VIRI Rashid MD

## 2023-10-04 NOTE — CARE PLAN
The patient's goals for the shift include  ambulate at least 3x this shift    The clinical goals for the shift include Pain score <4 by end of shift      Problem: Fall/Injury  Goal: Verbalize understanding of risk factor reduction measures to prevent injury from fall in the home  Outcome: Progressing     Problem: Fall/Injury  Goal: Use assistive devices by end of the shift  Outcome: Progressing     Problem: Pain  Goal: Takes deep breaths with improved pain control throughout the shift  Outcome: Progressing     Problem: Pain  Goal: Walks with improved pain control throughout the shift  Outcome: Progressing

## 2023-10-04 NOTE — PROGRESS NOTES
Met with patient at bedside. Admitted for left total knee replacement. Pt lives with son and was independent PTA with no HHC. Pt has a walker. PT/OT evals pending. [facility] Pt provided with number to call them when she arrives home.       Brandi Cotto RN

## 2023-10-04 NOTE — CARE PLAN
Problem: Balance  Goal: STG-Patient will be supervision with assistive device dynamic stand task >5 minutes for ADL completion   Outcome: Progressing     Problem: Dressings Lower Extremities  Goal: STG - Patient will complete lower body dressing with SBA with compensatory strategies and AE  Outcome: Progressing     Problem: Mobility  Goal: STG-Patient will be SBA with assistive device functional mobility tasks   Outcome: Progressing     Problem: Transfers  Goal: STG-Patient will be SBA with functional transfers   Outcome: Progressing

## 2023-10-04 NOTE — CARE PLAN
Problem: PT Problem  Goal: Pt will perform bed mobility with mod I.    Outcome: Progressing  Goal: Pt will complete sit <> stand and bed <> chair transfers with mod I.    Outcome: Progressing  Goal: Pt will ambulate 300 feet mod I with no significant gait deviations.    Outcome: Progressing  Goal: Pt will verbalize and demonstrate understanding of TKA supine/seated exercises.   Outcome: Progressing  Goal: Pt will ascend/descend at least 9 stairs using rail and cane in order to navigate home environment.    Outcome: Progressing

## 2023-10-05 PROBLEM — M17.9 ARTHRITIS OF KNEE, DEGENERATIVE: Status: RESOLVED | Noted: 2023-10-03 | Resolved: 2023-10-05

## 2023-10-05 PROBLEM — M17.10 ARTHRITIS OF KNEE: Status: RESOLVED | Noted: 2023-10-04 | Resolved: 2023-10-05

## 2023-10-05 PROBLEM — R53.1 WEAKNESS: Status: RESOLVED | Noted: 2023-10-04 | Resolved: 2023-10-05

## 2023-10-05 PROCEDURE — G0378 HOSPITAL OBSERVATION PER HR: HCPCS

## 2023-10-05 PROCEDURE — 97110 THERAPEUTIC EXERCISES: CPT | Mod: GP

## 2023-10-05 PROCEDURE — 2500000004 HC RX 250 GENERAL PHARMACY W/ HCPCS (ALT 636 FOR OP/ED): Performed by: PHYSICIAN ASSISTANT

## 2023-10-05 PROCEDURE — 97530 THERAPEUTIC ACTIVITIES: CPT | Mod: GO

## 2023-10-05 PROCEDURE — 2500000001 HC RX 250 WO HCPCS SELF ADMINISTERED DRUGS (ALT 637 FOR MEDICARE OP): Performed by: ORTHOPAEDIC SURGERY

## 2023-10-05 PROCEDURE — 2500000001 HC RX 250 WO HCPCS SELF ADMINISTERED DRUGS (ALT 637 FOR MEDICARE OP): Performed by: PHYSICIAN ASSISTANT

## 2023-10-05 PROCEDURE — 2500000004 HC RX 250 GENERAL PHARMACY W/ HCPCS (ALT 636 FOR OP/ED): Performed by: ORTHOPAEDIC SURGERY

## 2023-10-05 PROCEDURE — 94640 AIRWAY INHALATION TREATMENT: CPT

## 2023-10-05 PROCEDURE — 2500000002 HC RX 250 W HCPCS SELF ADMINISTERED DRUGS (ALT 637 FOR MEDICARE OP, ALT 636 FOR OP/ED): Performed by: PHYSICIAN ASSISTANT

## 2023-10-05 PROCEDURE — 97116 GAIT TRAINING THERAPY: CPT | Mod: GP

## 2023-10-05 RX ORDER — ASPIRIN 81 MG/1
81 TABLET ORAL 2 TIMES DAILY
Qty: 60 TABLET | Refills: 0 | Status: SHIPPED | OUTPATIENT
Start: 2023-10-05 | End: 2023-11-04

## 2023-10-05 RX ORDER — OXYCODONE HYDROCHLORIDE 5 MG/1
5 TABLET ORAL EVERY 6 HOURS PRN
Qty: 28 TABLET | Refills: 0 | Status: SHIPPED | OUTPATIENT
Start: 2023-10-05 | End: 2023-10-06 | Stop reason: SDUPTHER

## 2023-10-05 RX ORDER — ACETAMINOPHEN 325 MG/1
650 TABLET ORAL EVERY 6 HOURS PRN
Qty: 60 TABLET | Refills: 0 | Status: SHIPPED | OUTPATIENT
Start: 2023-10-05 | End: 2023-11-04

## 2023-10-05 RX ORDER — OXYCODONE HYDROCHLORIDE 5 MG/1
5 TABLET ORAL EVERY 6 HOURS PRN
Qty: 28 TABLET | Refills: 0 | Status: SHIPPED | OUTPATIENT
Start: 2023-10-05 | End: 2023-10-05 | Stop reason: SDUPTHER

## 2023-10-05 RX ORDER — POLYETHYLENE GLYCOL 3350 17 G/17G
17 POWDER, FOR SOLUTION ORAL DAILY
Qty: 7 PACKET | Refills: 0 | Status: SHIPPED | OUTPATIENT
Start: 2023-10-06 | End: 2023-10-13

## 2023-10-05 RX ADMIN — OXYCODONE HYDROCHLORIDE 5 MG: 5 TABLET ORAL at 07:55

## 2023-10-05 RX ADMIN — GABAPENTIN 100 MG: 100 CAPSULE ORAL at 20:49

## 2023-10-05 RX ADMIN — PANTOPRAZOLE SODIUM 40 MG: 40 TABLET, DELAYED RELEASE ORAL at 08:01

## 2023-10-05 RX ADMIN — METOPROLOL TARTRATE 25 MG: 25 TABLET, FILM COATED ORAL at 20:49

## 2023-10-05 RX ADMIN — BUDESONIDE 0.5 MG: 0.5 INHALANT RESPIRATORY (INHALATION) at 20:14

## 2023-10-05 RX ADMIN — ROSUVASTATIN CALCIUM 5 MG: 5 TABLET, FILM COATED ORAL at 20:49

## 2023-10-05 RX ADMIN — CYCLOBENZAPRINE 10 MG: 10 TABLET, FILM COATED ORAL at 07:55

## 2023-10-05 RX ADMIN — ACETAMINOPHEN 325MG 650 MG: 325 TABLET ORAL at 23:41

## 2023-10-05 RX ADMIN — POTASSIUM CHLORIDE 10 MEQ: 750 CAPSULE, EXTENDED RELEASE ORAL at 08:01

## 2023-10-05 RX ADMIN — BUDESONIDE 0.5 MG: 0.5 INHALANT RESPIRATORY (INHALATION) at 08:45

## 2023-10-05 RX ADMIN — FORMOTEROL FUMARATE 20 MCG: 20 SOLUTION RESPIRATORY (INHALATION) at 20:15

## 2023-10-05 RX ADMIN — FORMOTEROL FUMARATE 20 MCG: 20 SOLUTION RESPIRATORY (INHALATION) at 08:45

## 2023-10-05 RX ADMIN — ASPIRIN 81 MG: 81 TABLET, COATED ORAL at 20:49

## 2023-10-05 RX ADMIN — AMLODIPINE BESYLATE 5 MG: 5 TABLET ORAL at 08:00

## 2023-10-05 RX ADMIN — ASPIRIN 81 MG: 81 TABLET, COATED ORAL at 08:01

## 2023-10-05 RX ADMIN — ALLOPURINOL 100 MG: 100 TABLET ORAL at 08:01

## 2023-10-05 RX ADMIN — METOPROLOL TARTRATE 25 MG: 25 TABLET, FILM COATED ORAL at 08:00

## 2023-10-05 RX ADMIN — POLYETHYLENE GLYCOL 3350 17 G: 17 POWDER, FOR SOLUTION ORAL at 08:01

## 2023-10-05 ASSESSMENT — COGNITIVE AND FUNCTIONAL STATUS - GENERAL
DRESSING REGULAR LOWER BODY CLOTHING: A LITTLE
CLIMB 3 TO 5 STEPS WITH RAILING: A LITTLE
WALKING IN HOSPITAL ROOM: A LITTLE
MOBILITY SCORE: 18
STANDING UP FROM CHAIR USING ARMS: A LITTLE
DRESSING REGULAR UPPER BODY CLOTHING: A LITTLE
MOVING FROM LYING ON BACK TO SITTING ON SIDE OF FLAT BED WITH BEDRAILS: A LOT
HELP NEEDED FOR BATHING: A LITTLE
MOVING TO AND FROM BED TO CHAIR: A LITTLE
MOVING TO AND FROM BED TO CHAIR: A LITTLE
PERSONAL GROOMING: A LITTLE
DAILY ACTIVITIY SCORE: 19
TURNING FROM BACK TO SIDE WHILE IN FLAT BAD: A LOT
HELP NEEDED FOR BATHING: A LITTLE
WALKING IN HOSPITAL ROOM: A LITTLE
TOILETING: A LITTLE
TURNING FROM BACK TO SIDE WHILE IN FLAT BAD: A LITTLE
STANDING UP FROM CHAIR USING ARMS: A LITTLE
PERSONAL GROOMING: A LITTLE
DAILY ACTIVITIY SCORE: 19
DRESSING REGULAR LOWER BODY CLOTHING: A LITTLE
MOVING FROM LYING ON BACK TO SITTING ON SIDE OF FLAT BED WITH BEDRAILS: A LITTLE
MOBILITY SCORE: 16
DRESSING REGULAR UPPER BODY CLOTHING: A LITTLE
CLIMB 3 TO 5 STEPS WITH RAILING: A LITTLE
TOILETING: A LITTLE

## 2023-10-05 ASSESSMENT — PAIN - FUNCTIONAL ASSESSMENT
PAIN_FUNCTIONAL_ASSESSMENT: 0-10

## 2023-10-05 ASSESSMENT — PAIN SCALES - GENERAL
PAINLEVEL_OUTOF10: 0 - NO PAIN
PAINLEVEL_OUTOF10: 5 - MODERATE PAIN
PAINLEVEL_OUTOF10: 8
PAINLEVEL_OUTOF10: 8
PAINLEVEL_OUTOF10: 0 - NO PAIN

## 2023-10-05 NOTE — CARE PLAN
Problem: Fall/Injury  Goal: Verbalize understanding of risk factor reduction measures to prevent injury from fall in the home  Outcome: Progressing  Goal: Use assistive devices by end of the shift  Outcome: Progressing  Goal: Pace activities to prevent fatigue by end of the shift  Outcome: Progressing     Problem: Pain  Goal: Takes deep breaths with improved pain control throughout the shift  Outcome: Progressing  Goal: Turns in bed with improved pain control throughout the shift  Outcome: Progressing  Goal: Walks with improved pain control throughout the shift  Outcome: Progressing  Goal: Performs ADL's with improved pain control throughout shift  Outcome: Progressing  Goal: Participates in PT with improved pain control throughout the shift  Outcome: Progressing  Goal: Free from opioid side effects throughout the shift  Outcome: Progressing  Goal: Free from acute confusion related to pain meds throughout the shift  Outcome: Progressing     Problem: Infection related to problem list condition  Goal: Infection will resolve through treatment  Outcome: Progressing     Problem: Skin  Goal: Participates in plan/prevention/treatment measures  Outcome: Progressing  Goal: Prevent/minimize sheer/friction injuries  Outcome: Progressing  Goal: Promote/optimize nutrition  Outcome: Progressing  Goal: Promote skin healing  Outcome: Progressing     Problem: Safety  Goal: Patient will be injury free during hospitalization  Outcome: Progressing  Goal: I will remain free of falls  Outcome: Progressing     Problem: Daily Care  Goal: Daily care needs are met  Outcome: Progressing     Problem: Psychosocial Needs  Goal: Demonstrates ability to cope with hospitalization/illness  Outcome: Progressing  Goal: Collaborate with me, my family, and caregiver to identify my specific goals  Outcome: Progressing     Problem: Discharge Barriers  Goal: My discharge needs are met  Outcome: Progressing     Problem: Safety - Adult  Goal: Free from  fall injury  Outcome: Progressing     Problem: Discharge Planning  Goal: Discharge to home or other facility with appropriate resources  Outcome: Progressing     Problem: Chronic Conditions and Co-morbidities  Goal: Patient's chronic conditions and co-morbidity symptoms are monitored and maintained or improved  Outcome: Progressing     Problem: Dressings Lower Extremities  Goal: STG - Patient will complete lower body dressing with SBA with compensatory strategies and AE  Outcome: Progressing   The patient's goals for the shift include  comfort    The clinical goals for the shift include pt to gain adequate rest throughout shift    Over the shift, the patient did not make progress toward the following goals. Barriers to progression include pain control. Recommendations to address these barriers include prn pain medication.

## 2023-10-05 NOTE — PROGRESS NOTES
Physical Therapy    Physical Therapy    Physical Therapy Treatment    Patient Name: Norma Villasenor  MRN: [id number]  Today's Date: 10/5/2023  Time Calculation  Start Time: 1217  Stop Time: 1247  Time Calculation (min): 30 min       Assessment/Plan     PT Plan  Inpatient/Swing Bed or Outpatient: Inpatient  PT Plan  Treatment/Interventions: Transfer training, Gait training, Stair training  PT Plan: Skilled PT  PT Frequency: BID  PT Discharge Recommendations: Moderate intensity level of continued care  PT Recommended Transfer Status: Assist x1    Current Problem:  1. History of left knee replacement  Referral to Home Health          General Visit Information:   PT  Visit  PT Received On: 10/05/23  General  Reason for Referral: TKR (Right)  Patient Position Received: Bed, 3 rail up  Preferred Learning Style: verbal, visual  Subjective     Precautions:  Precautions  LE Weight Bearing Status: Weight Bearing as Tolerated  Medical Precautions: Fall precautions  Post-Surgical Precautions: Left total knee precautions    Vital Signs:     Objective     Pain:  Pain Assessment  Pain Assessment: 0-10  Pain Score: 8  Pain Type: Surgical pain  Pain Location: Knee  Pain Interventions: Cold applied    Cognition:  Cognition  Overall Cognitive Status: Within Functional Limits    Postural Control:       Extremity/Trunk Assessments:                Activity Tolerance:       Treatments:  Therapeutic Exercise  Therapeutic Exercise Performed: Yes  Therapeutic Exercise Activity 1: AP,QS,GS,SLR,HIP ABD, MARCHING,BALL SQUEEZES X 20 B WITH EMPHASIS ON OPERATED LE  Therapeutic Exercise Activity 2: ROM (8-80)           Ambulation/Gait Training  Ambulation/Gait Training Performed: Yes  Ambulation/Gait Training 1  Surface 1: Level tile  Device 1: Rolling walker  Gait Support Devices: Gait belt  Assistance 1: Contact guard  Quality of Gait 1:  (Step to)  Comments/Distance (ft) 1: 125 x 2  Ambulation/Gait Training 3  Surface 3: Level tile  Device 3:  Rolling walker  Gait Support Devices: Gait belt  Assistance 3: Contact guard, Minimum assistance  Comments/Distance (ft) 3:  (step to)  Transfers  Transfer: Yes  Transfer 1  Transfer From 1: Sit to  Transfer to 1: Stand  Transfer Device 1: Gait belt  Transfer Level of Assistance 1: Contact guard  Stairs  Stairs: Yes (3 steps)  Stairs  Rails 1: Left  Curb Step 1: No  Device 1: Single point cane  Support Devices 1: Gait belt  Assistance 1: Minimum assistance  Comment/Number of Steps 1: 3 steps       Outcome Measures:  [facility] Basic Mobility  Turning from your back to your side while in a flat bed without using bedrails: A lot  Moving from lying on your back to sitting on the side of a flat bed without using bedrails: A lot  Moving to and from bed to chair (including a wheelchair): A little  Standing up from a chair using your arms (e.g. wheelchair or bedside chair): A little  To walk in hospital room: A little  Climbing 3-5 steps with railing: A little  Basic Mobility - Total Score: 16  Education Documentation  Precautions, taught by Gina Navarro PTA at 10/5/2023  9:45 AM.  Learner: Patient  Readiness: Eager  Method: Explanation, Demonstration  Response: Verbalizes Understanding, Demonstrated Understanding    Body Mechanics, taught by Gina Navarro PTA at 10/5/2023  9:45 AM.  Learner: Patient  Readiness: Eager  Method: Explanation, Demonstration  Response: Verbalizes Understanding, Demonstrated Understanding    Home Exercise Program, taught by Gina Navarro PTA at 10/5/2023  9:45 AM.  Learner: Patient  Readiness: Eager  Method: Explanation, Demonstration  Response: Verbalizes Understanding, Demonstrated Understanding    Mobility Training, taught by Gina Navarro PTA at 10/5/2023  9:45 AM.  Learner: Patient  Readiness: Eager  Method: Explanation, Demonstration  Response: Verbalizes Understanding, Demonstrated Understanding    Education Comments  No comments found.        EDUCATION:     Encounter Problems        Encounter Problems (Active)       Balance       STG-Patient will be supervision with assistive device dynamic stand task >5 minutes for ADL completion  (Progressing)       Start:  10/04/23    Expected End:  10/18/23               Mobility       STG-Patient will be SBA with assistive device functional mobility tasks  (Progressing)       Start:  10/04/23    Expected End:  10/18/23               PT Problem       Pt will perform bed mobility with mod I.   (Progressing)       Start:  10/04/23    Expected End:  10/18/23            Pt will complete sit <> stand and bed <> chair transfers with mod I.   (Progressing)       Start:  10/04/23    Expected End:  10/18/23            Pt will ambulate 300 feet mod I with no significant gait deviations.   (Progressing)       Start:  10/04/23    Expected End:  10/18/23            Pt will verbalize and demonstrate understanding of TKA supine/seated exercises.  (Progressing)       Start:  10/04/23    Expected End:  10/18/23            Pt will ascend/descend at least 9 stairs using rail and cane in order to navigate home environment.   (Progressing)       Start:  10/04/23    Expected End:  10/18/23

## 2023-10-05 NOTE — PROGRESS NOTES
"Norma Villasenor is a 82 y.o. female on day 0 of admission presenting with Arthritis of knee, degenerative.    Subjective   Lying in bed upon entering room, verbalizing no complaints at this time.  Does describe a moderate amount of pain in left knee but is well controlled with p.o. pain medications.  No complaints of chest pain, shortness of breath, lightheaded or dizziness.  Participating in therapy.  Tolerating a diet with no nausea or vomiting.  Voiding well.  Plans on discharge to home with home health care.       Objective     Physical Exam  Constitutional:       Appearance: Normal appearance.   HENT:      Head: Atraumatic.      Mouth/Throat:      Mouth: Mucous membranes are moist.   Cardiovascular:      Rate and Rhythm: Normal rate and regular rhythm.   Pulmonary:      Effort: Pulmonary effort is normal.      Breath sounds: Normal breath sounds.   Musculoskeletal:      Comments: Left knee with surgical dressing in place clean dry and intact, distally sensation is present throughout left lower extremity, plantar and dorsiflexion against resistance present in left foot, DP pulse palpable   Skin:     General: Skin is warm and dry.   Neurological:      General: No focal deficit present.      Mental Status: She is alert.   Psychiatric:         Mood and Affect: Mood normal.         Last Recorded Vitals  Blood pressure 130/60, pulse 85, temperature 36.5 °C (97.7 °F), temperature source Temporal, resp. rate 18, height 1.499 m (4' 11.02\"), weight 58.9 kg (129 lb 13.6 oz), SpO2 96 %.  Intake/Output last 3 Shifts:  I/O last 3 completed shifts:  In: 586.7 (10 mL/kg) [I.V.:586.7 (10 mL/kg)]  Out: - (0 mL/kg)   Weight: 58.9 kg     Relevant Results    No results found.   Scheduled medications  acetaminophen, 650 mg, oral, q6h MARIA DEL CARMEN  allopurinol, 100 mg, oral, Daily  amLODIPine, 5 mg, oral, Daily  aspirin, 81 mg, oral, BID  budesonide, 0.5 mg, nebulization, BID  formoterol, 20 mcg, nebulization, BID  gabapentin, 100 mg, oral, " Nightly  metoprolol tartrate, 25 mg, oral, BID  pantoprazole, 40 mg, oral, Daily  polyethylene glycol, 17 g, oral, Daily  potassium chloride ER, 10 mEq, oral, Daily  rosuvastatin, 5 mg, oral, Nightly      Continuous medications  oxygen, 2 L/min, Last Rate: Stopped (10/03/23 1545)      PRN medications  PRN medications: cyclobenzaprine, dicyclomine, meclizine, morphine, naloxone, naloxone, oxyCODONE, oxyCODONE, oxyCODONE  No results found for this or any previous visit (from the past 24 hour(s)).                         Assessment/Plan   Principal Problem:    Arthritis of knee, degenerative  Active Problems:    Arthritis of knee    Weakness    Postop day #2 of right total knee replacement  Physical and Occupational Therapy on consult  Weightbearing as tolerated  Pain controlled with multimodal regime  Bowel regime  ASA for VTE prophylaxis  Appropriate home medications ordered for chronic medical conditions  Labs reviewed  Discharge to home with home health care       I spent 30 minutes in the professional and overall care of this patient.      Maranda Elizondo PA-C

## 2023-10-05 NOTE — PROGRESS NOTES
Occupational Therapy    OT Treatment    Patient Name: Norma Villasenor  MRN: [id number]  Today's Date: 10/5/2023  Time Calculation  Start Time: 1300  Stop Time: 1320  Time Calculation (min): 20 min         Assessment:  Evaluation/Treatment Tolerance: Patient tolerated treatment well  Evaluation/Treatment Tolerance: Patient tolerated treatment well  Barriers to Participation:  (Patient fatigued.)    Plan:  Treatment Interventions: Functional transfer training  OT Frequency: 1 time per day until discharge  OT Discharge Recommendations: Moderate intensity level of continued care  Treatment Interventions: Functional transfer training  Subjective     Current Problem:  1. History of left knee replacement  Referral to Home Health    aspirin 81 mg EC tablet    polyethylene glycol (Glycolax, Miralax) 17 gram packet    acetaminophen (Tylenol) 325 mg tablet    Referral to Home Health    oxyCODONE (Roxicodone) 5 mg immediate release tablet    DISCONTINUED: oxyCODONE (Roxicodone) 5 mg immediate release tablet          General:     Reason for Referral: L TKR  Patient Position Received:  (Patient just finished working with PTA and was walking back to her room.)    Vital Signs:       Pain:  Pain Assessment  Pain Assessment:  (No voiced complaints)  Objective      Activities of Daily Living:    Grooming  Grooming Comments:  (Patient too fatigued to stand at sink for ADL task at this time and wished to transfer to her recliner.)                   Functional Standing Tolerance:       Bed Mobility/Transfers:    Transfer 1  Trials/Comments 1: Patient walks slowy at this time.                Therapy/Activity:               Strength:       Other Activity:       Outcome Measures:[facility] Daily Activity  Putting on and taking off regular lower body clothing: A little  Bathing (including washing, rinsing, drying): A little  Putting on and taking off regular upper body clothing: A little  Toileting, which includes using toilet, bedpan or urinal: A  little  Taking care of personal grooming such as brushing teeth: A little  Eating Meals: None  Daily Activity - Total Score: 19  Education Documentation  Body Mechanics, taught by EVELINE Patel at 10/5/2023  2:58 PM.  Learner: Patient  Readiness: Acceptance  Method: Explanation  Response: Verbalizes Understanding    Precautions, taught by EVELINE Patel at 10/5/2023  2:58 PM.  Learner: Patient  Readiness: Acceptance  Method: Explanation  Response: Verbalizes Understanding    ADL Training, taught by EVELINE Patel at 10/5/2023  2:58 PM.  Learner: Patient  Readiness: Acceptance  Method: Explanation  Response: Verbalizes Understanding    Precautions, taught by EVELINE Patel at 10/5/2023  2:58 PM.  Learner: Patient  Readiness: Acceptance  Method: Explanation  Response: Verbalizes Understanding    Body Mechanics, taught by EVELINE Patel at 10/5/2023  2:58 PM.  Learner: Patient  Readiness: Acceptance  Method: Explanation  Response: Verbalizes Understanding    Home Exercise Program, taught by EVELINE Patel at 10/5/2023  2:58 PM.  Learner: Patient  Readiness: Acceptance  Method: Explanation  Response: Verbalizes Understanding    Mobility Training, taught by EVELINE Patel at 10/5/2023  2:58 PM.  Learner: Patient  Readiness: Acceptance  Method: Explanation  Response: Verbalizes Understanding    Education Comments  No comments found.        EDUCATION:  Education  Individual(s) Educated: Patient  Education Provided:  (safe transfers using wheeled walker)  Risk and Benefits Discussed with Patient/Caregiver/Other: yes  Patient/Caregiver Demonstrated Understanding: yes  Plan of Care Discussed and Agreed Upon: yes  Patient Response to Education: Patient/Caregiver Verbalized Understanding of Information    Goals:  Encounter Problems       Encounter Problems (Active)       Balance       STG-Patient will be supervision with assistive device dynamic stand task >5 minutes for ADL completion   (Progressing)       Start:  10/04/23    Expected End:  10/18/23               Dressings Lower Extremities       STG - Patient will complete lower body dressing with SBA with compensatory strategies and AE (Progressing)       Start:  10/04/23    Expected End:  10/18/23               Mobility       STG-Patient will be SBA with assistive device functional mobility tasks  (Progressing)       Start:  10/04/23    Expected End:  10/18/23               Transfers       STG-Patient will be SBA with functional transfers  (Progressing)       Start:  10/04/23    Expected End:  10/18/23

## 2023-10-05 NOTE — CARE PLAN
The patient's goals for the shift include      The clinical goals for the shift include pt to gain adequate rest throughout shift      Problem: Skin  Goal: Participates in plan/prevention/treatment measures  Outcome: Progressing  Goal: Prevent/minimize sheer/friction injuries  Outcome: Progressing  Flowsheets (Taken 10/5/2023 0938)  Prevent/minimize sheer/friction injuries:   Complete micro-shifts as needed if patient unable. Adjust patient position to relieve pressure points, not a full turn   Increase activity/out of bed for meals   Use pull sheet   HOB 30 degrees or less   Turn/reposition every 2 hours/use positioning/transfer devices   Utilize specialty bed per algorithm  Goal: Promote/optimize nutrition  Outcome: Progressing  Goal: Promote skin healing  Outcome: Progressing  Flowsheets (Taken 10/5/2023 0938)  Promote skin healing:   Assess skin/pad under line(s)/device(s)   Protective dressings over bony prominences   Ensure correct size (line/device) and apply per  instructions   Rotate device position/do not position patient on device

## 2023-10-05 NOTE — PROGRESS NOTES
[facility] asked to send new SNF referrals to [facility] 1. [facility] 2. [facility]. Pt will need precert.                      Brandi Cotto RN

## 2023-10-05 NOTE — PROGRESS NOTES
Physical Therapy    Physical Therapy    Physical Therapy Treatment    Patient Name: Norma Villasenor  MRN: [id number]  Today's Date: 10/5/2023  Time Calculation  Start Time: 1217  Stop Time: 1247  Time Calculation (min): 30 min       Assessment/Plan   @[id number]([id number]:::1)@  PT Plan  Inpatient/Swing Bed or Outpatient: Inpatient  PT Plan  Treatment/Interventions: Transfer training, Gait training, Stair training  PT Plan: Skilled PT  PT Frequency: BID  PT Discharge Recommendations: Moderate intensity level of continued care  PT Recommended Transfer Status: Assist x1    Current Problem:  1. History of left knee replacement  Referral to Home Health          General Visit Information:   PT  Visit  PT Received On: 10/05/23  General  Reason for Referral: TKR (Right)  Patient Position Received: Bed, 3 rail up  Preferred Learning Style: verbal, visual  Subjective     Precautions:  Precautions  LE Weight Bearing Status: Weight Bearing as Tolerated  Medical Precautions: Fall precautions  Post-Surgical Precautions: Left total knee precautions    Vital Signs:     Objective     Pain:  Pain Assessment  Pain Assessment: 0-10  Pain Score: 8  Pain Type: Surgical pain  Pain Location: Knee  Pain Interventions: Cold applied    Cognition:  Cognition  Overall Cognitive Status: Within Functional Limits    Postural Control:       Extremity/Trunk Assessments:                Activity Tolerance:       Treatments:  Therapeutic Exercise  Therapeutic Exercise Performed: Yes  Therapeutic Exercise Activity 1: AP,QS,GS,SLR,HIP ABD, MARCHING,BALL SQUEEZES X 20 B WITH EMPHASIS ON OPERATED LE  Therapeutic Exercise Activity 2: ROM (8-80)        Bed Mobility  Bed Mobility: Yes  Bed Mobility 1  Bed Mobility 1: Supine to sitting  Level of Assistance 1: Close supervision (Instructed inuse of gait belt for L LE out of bed)  Ambulation/Gait Training  Ambulation/Gait Training Performed: Yes  Ambulation/Gait Training 1  Surface 1: Level tile  Device 1: Rolling  walker  Gait Support Devices: Gait belt  Assistance 1: Contact guard  Quality of Gait 1:  (Step to)  Comments/Distance (ft) 1: 125 x 2  Ambulation/Gait Training 3  Surface 3: Level tile  Device 3: Rolling walker  Gait Support Devices: Gait belt  Assistance 3: Contact guard, Minimum assistance  Comments/Distance (ft) 3:  (step to)  Transfers  Transfer: Yes  Transfer 1  Transfer From 1: Sit to  Transfer to 1: Stand  Transfer Device 1: Gait belt  Transfer Level of Assistance 1: Contact guard  Stairs  Stairs: Yes  Stairs  Rails 1: Left  Curb Step 1: No  Device 1: Single point cane  Support Devices 1: Gait belt  Assistance 1: Minimum assistance, Moderate assistance  Comment/Number of Steps 1: 3 (Patient needing increased assist this pm for stair training, fatigues with 3 steps has 10 to enter)       Outcome Measures:  [facility] Basic Mobility  Turning from your back to your side while in a flat bed without using bedrails: A lot  Moving from lying on your back to sitting on the side of a flat bed without using bedrails: A lot  Moving to and from bed to chair (including a wheelchair): A little  Standing up from a chair using your arms (e.g. wheelchair or bedside chair): A little  To walk in hospital room: A little  Climbing 3-5 steps with railing: A little  Basic Mobility - Total Score: 16  Education Documentation  Body Mechanics, taught by Gina Navarro PTA at 10/5/2023  1:30 PM.  Learner: Patient  Readiness: Eager  Method: Explanation, Demonstration  Response: Verbalizes Understanding, Demonstrated Understanding    Precautions, taught by Gina Navarro PTA at 10/5/2023  1:30 PM.  Learner: Patient  Readiness: Eager  Method: Explanation, Demonstration  Response: Verbalizes Understanding, Demonstrated Understanding    ADL Training, taught by Gina Navarro PTA at 10/5/2023  1:30 PM.  Learner: Patient  Readiness: Eager  Method: Explanation, Demonstration  Response: Verbalizes Understanding, Demonstrated  Understanding    Home Exercise Program, taught by Gina Navarro PTA at 10/5/2023  1:30 PM.  Learner: Patient  Readiness: Eager  Method: Explanation, Demonstration  Response: Verbalizes Understanding, Demonstrated Understanding    Mobility Training, taught by Gina Navarro PTA at 10/5/2023  1:30 PM.  Learner: Patient  Readiness: Eager  Method: Explanation, Demonstration  Response: Verbalizes Understanding, Demonstrated Understanding    Precautions, taught by Gina Navarro PTA at 10/5/2023  9:45 AM.  Learner: Patient  Readiness: Eager  Method: Explanation, Demonstration  Response: Verbalizes Understanding, Demonstrated Understanding    Body Mechanics, taught by Gina Navarro PTA at 10/5/2023  9:45 AM.  Learner: Patient  Readiness: Eager  Method: Explanation, Demonstration  Response: Verbalizes Understanding, Demonstrated Understanding    Home Exercise Program, taught by Gina Navarro PTA at 10/5/2023  9:45 AM.  Learner: Patient  Readiness: Eager  Method: Explanation, Demonstration  Response: Verbalizes Understanding, Demonstrated Understanding    Mobility Training, taught by Gina Navarro PTA at 10/5/2023  9:45 AM.  Learner: Patient  Readiness: Eager  Method: Explanation, Demonstration  Response: Verbalizes Understanding, Demonstrated Understanding    Education Comments  No comments found.        EDUCATION:     Encounter Problems       Encounter Problems (Active)                       PT Problem       Pt will perform bed mobility with mod I.   (Progressing)       Start:  10/04/23    Expected End:  10/18/23            Pt will complete sit <> stand and bed <> chair transfers with mod I.   (Progressing)       Start:  10/04/23    Expected End:  10/18/23            Pt will ambulate 300 feet mod I with no significant gait deviations.   (Progressing)       Start:  10/04/23    Expected End:  10/18/23            Pt will verbalize and demonstrate understanding of TKA supine/seated exercises.  (Progressing)        Start:  10/04/23    Expected End:  10/18/23            Pt will ascend/descend at least 9 stairs using rail and cane in order to navigate home environment.   (Progressing)       Start:  10/04/23    Expected End:  10/18/23

## 2023-10-05 NOTE — DISCHARGE INSTRUCTIONS
Total Knee Replacement  Discharge Instructions      Activity as tolerated, weightbearing as tolerated on left lower extremity with walker  To prevent Clot formation, you have been placed on the following medication:  ASA for 30 days started on  Surgical Site Care:    You may leave wound open to air after initial dressing removal, if wound is clean, dry and intact  If Aquacel Ag dressing is present, do not remove dressing for 7 days, unless heavily saturated. If heavily saturated, remove dressing and start using instructions above  Staples will be removed on post-operative day 14 and steri-strips applied  Showering is permitted starting POD1 if waterproof Aquacel dressing is present or when the incision is covered with 4 x 4 and Tegaderm waterproof dressing  Until all areas of incision are healed.    Physical Therapy:  Weight Bearing Status:  WBAT  Precautions, Per Physical Therapy Handout  Pain Medications  You were given oxycodone (Oxycontin, Oxyir)  Wean off pain medications as you deem appropriate as long as pain is under control  Cold packs/Ice packs/Machine  May be used 3 times daily for 15-30 minutes as necessary  Be sure to have a barrier (cloth, clothing, towel) between the site and the ice pack to prevent frostbite  Contact Center for Orthopedic surgeon  Increased redness, swelling, drainage of any kind, and/or pain to surgery site.  As well as new onset fevers and or chills.  These could signify an infection.  Calf or thigh tenderness to touch as well as increased swelling or redness.  This could signify a clot formation.  Numbness or tingling to an area around the incision site or below the incision site (toes).  Any rash appears, increased  or new onset nausea/vomiting occur.  This may indicate a reaction to a medication.

## 2023-10-05 NOTE — DISCHARGE SUMMARY
Discharge Diagnosis  Arthritis of knee, degenerative    Issues Requiring Follow-Up  Postoperative follow-up    Test Results Pending At Discharge  Pending Labs       No current pending labs.            Hospital Course   82-year-old female presents for elective left total knee replacement on October 3, 2023.  Had satisfactory postoperative course.  Participated in physical and occupational therapy.  ASA for VTE prophylaxis.  Discharged on oxycodone, educated on proper use and disposal of any unused medications.  All questions were answered and patient was discharged to home with home health care in satisfactory condition.    Pertinent Physical Exam At Time of Discharge  Physical Exam    Home Medications     Medication List      START taking these medications     acetaminophen 325 mg tablet; Commonly known as: Tylenol; Take 2 tablets   (650 mg) by mouth every 6 hours if needed (pain).   oxyCODONE 5 mg immediate release tablet; Commonly known as: Roxicodone;   Take 1 tablet (5 mg) by mouth every 6 hours if needed (pain) for up to 7   days.   polyethylene glycol 17 gram packet; Commonly known as: Glycolax,   Miralax; Take 17 g by mouth once daily for 7 days. Do not start before   October 6, 2023.; Start taking on: October 6, 2023     CHANGE how you take these medications     aspirin 81 mg EC tablet; Take 1 tablet (81 mg) by mouth 2 times a day.;   What changed: when to take this     CONTINUE taking these medications     allopurinol 100 mg tablet; Commonly known as: Zyloprim   amLODIPine 5 mg tablet; Commonly known as: Norvasc   Breo Ellipta 200-25 mcg/dose inhaler; Generic drug: fluticasone   furoate-vilanteroL   dicyclomine 10 mg capsule; Commonly known as: Bentyl   gabapentin 100 mg capsule; Commonly known as: Neurontin   meclizine 25 mg tablet; Commonly known as: Antivert   metoprolol succinate XL 50 mg 24 hr tablet; Commonly known as: Toprol-XL   pantoprazole 40 mg EC tablet; Commonly known as: ProtoNix   potassium  chloride CR 10 mEq ER tablet; Commonly known as: Klor-Con   rosuvastatin 5 mg tablet; Commonly known as: Crestor     STOP taking these medications     calcitriol 0.25 mcg capsule; Commonly known as: Rocaltrol   tiZANidine 2 mg tablet; Commonly known as: Zanaflex       Outpatient Follow-Up  Follow-up with Dr. Rashid as directed    Maranda Elizondo PA-C

## 2023-10-06 PROCEDURE — 2500000002 HC RX 250 W HCPCS SELF ADMINISTERED DRUGS (ALT 637 FOR MEDICARE OP, ALT 636 FOR OP/ED): Performed by: PHYSICIAN ASSISTANT

## 2023-10-06 PROCEDURE — 97535 SELF CARE MNGMENT TRAINING: CPT | Mod: GO

## 2023-10-06 PROCEDURE — 97110 THERAPEUTIC EXERCISES: CPT | Mod: GP

## 2023-10-06 PROCEDURE — 2500000001 HC RX 250 WO HCPCS SELF ADMINISTERED DRUGS (ALT 637 FOR MEDICARE OP): Performed by: ORTHOPAEDIC SURGERY

## 2023-10-06 PROCEDURE — 2500000004 HC RX 250 GENERAL PHARMACY W/ HCPCS (ALT 636 FOR OP/ED): Performed by: ORTHOPAEDIC SURGERY

## 2023-10-06 PROCEDURE — 97116 GAIT TRAINING THERAPY: CPT | Mod: GP

## 2023-10-06 PROCEDURE — G0378 HOSPITAL OBSERVATION PER HR: HCPCS

## 2023-10-06 PROCEDURE — 2500000004 HC RX 250 GENERAL PHARMACY W/ HCPCS (ALT 636 FOR OP/ED): Performed by: PHYSICIAN ASSISTANT

## 2023-10-06 PROCEDURE — 94640 AIRWAY INHALATION TREATMENT: CPT

## 2023-10-06 PROCEDURE — 94760 N-INVAS EAR/PLS OXIMETRY 1: CPT

## 2023-10-06 PROCEDURE — 2500000001 HC RX 250 WO HCPCS SELF ADMINISTERED DRUGS (ALT 637 FOR MEDICARE OP): Performed by: PHYSICIAN ASSISTANT

## 2023-10-06 PROCEDURE — 97530 THERAPEUTIC ACTIVITIES: CPT | Mod: GO

## 2023-10-06 RX ORDER — OXYCODONE HYDROCHLORIDE 5 MG/1
5 TABLET ORAL EVERY 6 HOURS PRN
Qty: 3 TABLET | Refills: 0 | Status: SHIPPED | OUTPATIENT
Start: 2023-10-06 | End: 2023-10-07

## 2023-10-06 RX ADMIN — BUDESONIDE 0.5 MG: 0.5 INHALANT RESPIRATORY (INHALATION) at 20:30

## 2023-10-06 RX ADMIN — OXYCODONE HYDROCHLORIDE 5 MG: 5 TABLET ORAL at 07:53

## 2023-10-06 RX ADMIN — BUDESONIDE 0.5 MG: 0.5 INHALANT RESPIRATORY (INHALATION) at 09:11

## 2023-10-06 RX ADMIN — FORMOTEROL FUMARATE 20 MCG: 20 SOLUTION RESPIRATORY (INHALATION) at 09:11

## 2023-10-06 RX ADMIN — ASPIRIN 81 MG: 81 TABLET, COATED ORAL at 21:53

## 2023-10-06 RX ADMIN — CYCLOBENZAPRINE 10 MG: 10 TABLET, FILM COATED ORAL at 07:54

## 2023-10-06 RX ADMIN — POTASSIUM CHLORIDE 10 MEQ: 750 CAPSULE, EXTENDED RELEASE ORAL at 07:53

## 2023-10-06 RX ADMIN — POLYETHYLENE GLYCOL 3350 17 G: 17 POWDER, FOR SOLUTION ORAL at 07:54

## 2023-10-06 RX ADMIN — METOPROLOL TARTRATE 25 MG: 25 TABLET, FILM COATED ORAL at 07:54

## 2023-10-06 RX ADMIN — ALLOPURINOL 100 MG: 100 TABLET ORAL at 07:54

## 2023-10-06 RX ADMIN — ROSUVASTATIN CALCIUM 5 MG: 5 TABLET, FILM COATED ORAL at 21:54

## 2023-10-06 RX ADMIN — AMLODIPINE BESYLATE 5 MG: 5 TABLET ORAL at 07:53

## 2023-10-06 RX ADMIN — ASPIRIN 81 MG: 81 TABLET, COATED ORAL at 07:53

## 2023-10-06 RX ADMIN — FORMOTEROL FUMARATE 20 MCG: 20 SOLUTION RESPIRATORY (INHALATION) at 20:30

## 2023-10-06 RX ADMIN — ACETAMINOPHEN 325MG 650 MG: 325 TABLET ORAL at 18:25

## 2023-10-06 RX ADMIN — PANTOPRAZOLE SODIUM 40 MG: 40 TABLET, DELAYED RELEASE ORAL at 07:54

## 2023-10-06 RX ADMIN — METOPROLOL TARTRATE 25 MG: 25 TABLET, FILM COATED ORAL at 21:54

## 2023-10-06 RX ADMIN — GABAPENTIN 100 MG: 100 CAPSULE ORAL at 21:54

## 2023-10-06 ASSESSMENT — COGNITIVE AND FUNCTIONAL STATUS - GENERAL
MOVING TO AND FROM BED TO CHAIR: A LITTLE
DRESSING REGULAR UPPER BODY CLOTHING: A LITTLE
STANDING UP FROM CHAIR USING ARMS: A LITTLE
CLIMB 3 TO 5 STEPS WITH RAILING: A LITTLE
TURNING FROM BACK TO SIDE WHILE IN FLAT BAD: A LITTLE
PERSONAL GROOMING: A LITTLE
CLIMB 3 TO 5 STEPS WITH RAILING: A LOT
MOVING FROM LYING ON BACK TO SITTING ON SIDE OF FLAT BED WITH BEDRAILS: A LITTLE
TOILETING: A LITTLE
MOBILITY SCORE: 17
MOVING FROM LYING ON BACK TO SITTING ON SIDE OF FLAT BED WITH BEDRAILS: A LITTLE
MOBILITY SCORE: 18
TOILETING: A LITTLE
HELP NEEDED FOR BATHING: A LITTLE
CLIMB 3 TO 5 STEPS WITH RAILING: A LOT
STANDING UP FROM CHAIR USING ARMS: A LITTLE
DRESSING REGULAR LOWER BODY CLOTHING: A LITTLE
STANDING UP FROM CHAIR USING ARMS: A LITTLE
MOVING TO AND FROM BED TO CHAIR: A LITTLE
HELP NEEDED FOR BATHING: A LITTLE
WALKING IN HOSPITAL ROOM: A LITTLE
MOVING TO AND FROM BED TO CHAIR: A LITTLE
MOBILITY SCORE: 19
DAILY ACTIVITIY SCORE: 20
DAILY ACTIVITIY SCORE: 19
WALKING IN HOSPITAL ROOM: A LITTLE
DRESSING REGULAR UPPER BODY CLOTHING: A LITTLE
DRESSING REGULAR LOWER BODY CLOTHING: A LITTLE
TURNING FROM BACK TO SIDE WHILE IN FLAT BAD: A LITTLE
WALKING IN HOSPITAL ROOM: A LITTLE

## 2023-10-06 ASSESSMENT — PAIN SCALES - GENERAL
PAINLEVEL_OUTOF10: 2
PAINLEVEL_OUTOF10: 0 - NO PAIN
PAINLEVEL_OUTOF10: 6
PAINLEVEL_OUTOF10: 4
PAINLEVEL_OUTOF10: 4
PAINLEVEL_OUTOF10: 6

## 2023-10-06 ASSESSMENT — ACTIVITIES OF DAILY LIVING (ADL): HOME_MANAGEMENT_TIME_ENTRY: 15

## 2023-10-06 ASSESSMENT — PAIN - FUNCTIONAL ASSESSMENT
PAIN_FUNCTIONAL_ASSESSMENT: 0-10

## 2023-10-06 NOTE — DISCHARGE SUMMARY
Discharge summary  This patient Norma Villasenor was admitted to the hospital on 10/3/2023  after undergoing Procedure(s) (LRB):  LEFT TOTAL KNEE REPLACEMENT (Left) without complications that morning.    During the postoperative period,while in hospital, patient was medically managed by the hospitalist. Please see medial notes and H&P for patients additional diagnoses.  Ortho agrees with all medical diagnoses and treatments while patient in hospital.  No significant or unexpected findings or abnormal ortho imaging were noted during the hospital stay    Hospital course      Patient tolerated surgical procedure well and there was no complications. Patient progressed adequately through their recovery during hospital stay including PT and rehabilitation.    Patient was then D/C on  to skilled nursing facility  in stable condition.  Patient was instructed on the use of pain medications, the signs and symptoms of infection, and was given our number to call should they have any questions or concerns following discharge.    Based on my clinical judgment, the patient was provided with a 7-day prescription for opioid medication at 30 MED, indicated for treatment of acute pain in the setting of recent left TKA. OARRS report was run and has demonstrated an appropriate time course.  The patient has been provided with counseling pertaining to safe use of opioid medication.      Patient may weight bear as tolerated to operative extremity with use of walker for assistance with ambulation   Aquacel dressing to be removed pod7 and incision left open to air  Aspirin 81mg BID  for DVT prophylaxis started on 10/4/2023 and to be taken for 30 days  Follow up with surgeon in 2 weeks

## 2023-10-06 NOTE — PROGRESS NOTES
Physical Therapy    Physical Therapy    Physical Therapy Treatment    Patient Name: Norma Villasenor  MRN: [id number]  Today's Date: 10/6/2023  Time Calculation  Start Time: 1356  Stop Time: 1424  Time Calculation (min): 28 min       Assessment/Plan     PT Plan  Inpatient/Swing Bed or Outpatient: Inpatient  PT Plan  Treatment/Interventions: Transfer training, Gait training, Stair training  PT Plan: Skilled PT  PT Frequency: BID  PT Discharge Recommendations: Moderate intensity level of continued care  PT Recommended Transfer Status: Assist x1    Current Problem:  1. History of left knee replacement  Referral to Home Health    aspirin 81 mg EC tablet    polyethylene glycol (Glycolax, Miralax) 17 gram packet    acetaminophen (Tylenol) 325 mg tablet    oxyCODONE (Roxicodone) 5 mg immediate release tablet    Referral to Home Health    DISCONTINUED: oxyCODONE (Roxicodone) 5 mg immediate release tablet    CANCELED: Referral to Home Health          General Visit Information:   PT  Visit  PT Received On: 10/06/23  General  Reason for Referral: TKR (Left)  Patient Position Received: Bed, 3 rail up  Preferred Learning Style: verbal, visual  Subjective     Precautions:  Precautions  LE Weight Bearing Status: Weight Bearing as Tolerated  Medical Precautions: Fall precautions  Post-Surgical Precautions: Left total knee precautions    Vital Signs:     Objective     Pain:  Pain Assessment  Pain Assessment: 0-10  Pain Score: 6  Pain Type: Surgical pain  Pain Location: Knee  Pain Interventions: Cold applied    Cognition:  Cognition  Overall Cognitive Status: Within Functional Limits    Postural Control:       Extremity/Trunk Assessments:                Activity Tolerance:       Treatments:  Therapeutic Exercise  Therapeutic Exercise Performed: Yes  Therapeutic Exercise Activity 1: AP,QS,GS,SLR,HIP ABD, MARCHING,BALL SQUEEZES X 20 B WITH EMPHASIS ON OPERATED LE  Therapeutic Exercise Activity 2: ROM        Bed Mobility  Bed Mobility:  Yes  Bed Mobility 1  Bed Mobility 1: Supine to sitting  Level of Assistance 1: Minimum assistance  Ambulation/Gait Training  Ambulation/Gait Training Performed: Yes  Ambulation/Gait Training 1  Surface 1: Level tile  Device 1: Rolling walker  Gait Support Devices: Gait belt  Assistance 1: Contact guard  Quality of Gait 1:  (Step to)  Comments/Distance (ft) 1: 100  Ambulation/Gait Training 3  Surface 3: Level tile  Device 3: Rolling walker  Gait Support Devices: Gait belt  Assistance 3: Contact guard, Minimum assistance  Comments/Distance (ft) 3:  (step to)  Transfers  Transfer: Yes  Transfer 1  Transfer From 1: Sit to  Transfer to 1: Stand  Transfer Device 1: Gait belt  Transfer Level of Assistance 1: Contact guard  Stairs  Stairs: Yes  Stairs  Rails 1: Left  Curb Step 1: No  Device 1: Single point cane  Support Devices 1: Gait belt  Assistance 1: Minimum assistance, Moderate assistance  Comment/Number of Steps 1: 3 (Patient needing increased assist this pm for stair training, fatigues with 3 steps has 10 to enter)       Outcome Measures:  [facility] Basic Mobility  Turning from your back to your side while in a flat bed without using bedrails: A little  Moving from lying on your back to sitting on the side of a flat bed without using bedrails: A little  Moving to and from bed to chair (including a wheelchair): A little  Standing up from a chair using your arms (e.g. wheelchair or bedside chair): A little  To walk in hospital room: A little  Climbing 3-5 steps with railing: A lot  Basic Mobility - Total Score: 17  Education Documentation  Body Mechanics, taught by Gina Navarro PTA at 10/6/2023  2:30 PM.  Learner: Patient  Readiness: Acceptance  Method: Demonstration, Explanation  Response: Needs Reinforcement, Demonstrated Understanding    Precautions, taught by Gina Navarro PTA at 10/6/2023  2:30 PM.  Learner: Patient  Readiness: Acceptance  Method: Demonstration, Explanation  Response: Needs Reinforcement,  Demonstrated Understanding    ADL Training, taught by Gina Navarro PTA at 10/6/2023  2:30 PM.  Learner: Patient  Readiness: Acceptance  Method: Demonstration, Explanation  Response: Needs Reinforcement, Demonstrated Understanding    Precautions, taught by Gina Navarro PTA at 10/6/2023  2:30 PM.  Learner: Patient  Readiness: Acceptance  Method: Demonstration, Explanation  Response: Needs Reinforcement, Demonstrated Understanding    Body Mechanics, taught by Gina Navarro PTA at 10/6/2023  2:30 PM.  Learner: Patient  Readiness: Acceptance  Method: Demonstration, Explanation  Response: Needs Reinforcement, Demonstrated Understanding    Home Exercise Program, taught by Gina Navarro PTA at 10/6/2023  2:30 PM.  Learner: Patient  Readiness: Acceptance  Method: Demonstration, Explanation  Response: Needs Reinforcement, Demonstrated Understanding    Mobility Training, taught by Gina Navarro PTA at 10/6/2023  2:30 PM.  Learner: Patient  Readiness: Acceptance  Method: Demonstration, Explanation  Response: Needs Reinforcement, Demonstrated Understanding    Body Mechanics, taught by Gina Navarro PTA at 10/6/2023 10:59 AM.  Learner: Patient  Readiness: Acceptance  Method: Explanation, Demonstration  Response: Verbalizes Understanding, Demonstrated Understanding    Precautions, taught by Gina Navarro PTA at 10/6/2023 10:59 AM.  Learner: Patient  Readiness: Acceptance  Method: Explanation, Demonstration  Response: Verbalizes Understanding, Demonstrated Understanding    ADL Training, taught by Gina Navarro PTA at 10/6/2023 10:59 AM.  Learner: Patient  Readiness: Acceptance  Method: Explanation, Demonstration  Response: Verbalizes Understanding, Demonstrated Understanding    Precautions, taught by Gina Navarro PTA at 10/6/2023 10:59 AM.  Learner: Patient  Readiness: Acceptance  Method: Explanation, Demonstration  Response: Verbalizes Understanding, Demonstrated Understanding    Body Mechanics,  taught by Gina Navarro PTA at 10/6/2023 10:59 AM.  Learner: Patient  Readiness: Acceptance  Method: Explanation, Demonstration  Response: Verbalizes Understanding, Demonstrated Understanding    Home Exercise Program, taught by Gina Navarro PTA at 10/6/2023 10:59 AM.  Learner: Patient  Readiness: Acceptance  Method: Explanation, Demonstration  Response: Verbalizes Understanding, Demonstrated Understanding    Mobility Training, taught by Gina Navarro PTA at 10/6/2023 10:59 AM.  Learner: Patient  Readiness: Acceptance  Method: Explanation, Demonstration  Response: Verbalizes Understanding, Demonstrated Understanding    Education Comments  No comments found.        EDUCATION:     Encounter Problems       Encounter Problems (Active)       Balance       STG-Patient will be supervision with assistive device dynamic stand task >5 minutes for ADL completion  (Progressing)       Start:  10/04/23    Expected End:  10/18/23               Mobility       STG-Patient will be SBA with assistive device functional mobility tasks  (Progressing)       Start:  10/04/23    Expected End:  10/18/23               PT Problem       Pt will perform bed mobility with mod I.   (Progressing)       Start:  10/04/23    Expected End:  10/18/23            Pt will complete sit <> stand and bed <> chair transfers with mod I.   (Progressing)       Start:  10/04/23    Expected End:  10/18/23            Pt will ambulate 300 feet mod I with no significant gait deviations.   (Progressing)       Start:  10/04/23    Expected End:  10/18/23            Pt will verbalize and demonstrate understanding of TKA supine/seated exercises.  (Progressing)       Start:  10/04/23    Expected End:  10/18/23            Pt will ascend/descend at least 9 stairs using rail and cane in order to navigate home environment.   (Progressing)       Start:  10/04/23    Expected End:  10/18/23               Transfers       STG-Patient will be SBA with functional transfers   (Progressing)       Start:  10/04/23    Expected End:  10/18/23               2

## 2023-10-06 NOTE — PROGRESS NOTES
"Occupational Therapy    OT Treatment    Patient Name: Norma Villasenor  MRN: [id number]  Today's Date: 10/6/2023  Time Calculation  Start Time: 1329  Stop Time: 1355  Time Calculation (min): 26 min         Assessment:  Evaluation/Treatment Tolerance: Patient tolerated treatment well  Evaluation/Treatment Tolerance: Patient tolerated treatment well  Barriers to Participation:  (Patient fatigued.)    Plan:  Treatment Interventions: Functional transfer training  OT Frequency: 1 time per day until discharge  OT Discharge Recommendations: Moderate intensity level of continued care  Treatment Interventions: Functional transfer training  Subjective     Current Problem:  1. History of left knee replacement  Referral to Home Health    aspirin 81 mg EC tablet    polyethylene glycol (Glycolax, Miralax) 17 gram packet    acetaminophen (Tylenol) 325 mg tablet    oxyCODONE (Roxicodone) 5 mg immediate release tablet    Referral to Home Health    DISCONTINUED: oxyCODONE (Roxicodone) 5 mg immediate release tablet    CANCELED: Referral to Home Health          General:  OT Received On: 10/06/23  Reason for Referral: L TKR  Patient Position Received:  (Patient just finished working with PTA and was walking back to her room.)    Vital Signs:       Pain:  Pain Assessment  Pain Assessment:  (Patient c/o \"slight\" pain when getting out of bed.)  Objective      Activities of Daily Living:    Grooming  Grooming Level of Assistance: Contact guard  Grooming Where Assessed: Standing sinkside  Grooming Comments:  (Verbal cues for safe walker placement at sink. Patient stood for hair brushing and oral hygiene.  Patient stated she was bearing most of her weight through her non operated leg.  No loss of balance.)                   Functional Standing Tolerance:  Time: 6 min  Activity:  (grooming tasks)    Bed Mobility/Transfers: Bed Mobility 1  Bed Mobility 1: Supine to sitting  Level of Assistance 1: Minimum assistance  Bed Mobility Comments 1:  (Verbal " cues and assist with bed covers.  HOB was partially elevated, rail not used.)  Transfer 1  Transfer Device 1: Walker  Trials/Comments 1:  (Walk from bed to sink using wheeled walker.  When finished, patient started walk back to her recliner, however, PTA entered room to work with patient who was agreeable.)                Therapy/Activity:               Strength:       Other Activity:       Outcome Measures:[facility] Daily Activity  Putting on and taking off regular lower body clothing: A little  Bathing (including washing, rinsing, drying): A little  Putting on and taking off regular upper body clothing: A little  Toileting, which includes using toilet, bedpan or urinal: A little  Taking care of personal grooming such as brushing teeth: None  Eating Meals: None  Daily Activity - Total Score: 20  Education Documentation      ADL Training, taught by EVELINE Patel at 10/6/2023  2:18 PM.  Learner: Patient  Readiness: Acceptance  Method: Demonstration, Explanation  Response: Verbalizes Understanding        Education Comments  No comments found.        EDUCATION:  Education  Individual(s) Educated: Patient  Education Provided: Fall precautons  Risk and Benefits Discussed with Patient/Caregiver/Other: yes  Patient/Caregiver Demonstrated Understanding: yes  Plan of Care Discussed and Agreed Upon: yes  Patient Response to Education: Patient/Caregiver Verbalized Understanding of Information    Goals:  Encounter Problems       Encounter Problems (Active)       Balance       STG-Patient will be supervision with assistive device dynamic stand task >5 minutes for ADL completion  (Progressing)       Start:  10/04/23    Expected End:  10/18/23               Dressings Lower Extremities       STG - Patient will complete lower body dressing with SBA with compensatory strategies and AE (Progressing)       Start:  10/04/23    Expected End:  10/18/23               Mobility       STG-Patient will be SBA with assistive device functional  mobility tasks  (Progressing)       Start:  10/04/23    Expected End:  10/18/23               Transfers       STG-Patient will be SBA with functional transfers  (Progressing)       Start:  10/04/23    Expected End:  10/18/23

## 2023-10-06 NOTE — CARE PLAN
The patient's goals for the shift include      The clinical goals for the shift include pt to ambulate throughout shift    Over the shift, the patient did not make progress toward the following goals. Barriers to progression include pain. Recommendations to address these barriers include pain medications.

## 2023-10-06 NOTE — PROGRESS NOTES
"Norma Villasenor is a 82 y.o. female on day 0 of admission presenting with Arthritis of knee, degenerative.    Subjective   Ms. Villasenor is sitting up in her chair this morning, good pain control.  She has been struggling in therapy to stand from a seated position and requires assistance to do so.  She also has been struggling on stairs and has some concerns for safety upon discharge since she lives in a split-level.       Objective     Physical Exam  Constitutional:       Appearance: Normal appearance.   HENT:      Head: Normocephalic.      Nose: Nose normal.      Mouth/Throat:      Mouth: Mucous membranes are moist.      Pharynx: Oropharynx is clear.   Eyes:      Extraocular Movements: Extraocular movements intact.   Pulmonary:      Effort: Pulmonary effort is normal.   Abdominal:      Palpations: Abdomen is soft.   Musculoskeletal:      Comments: Left knee dressing is dry and intact.  light touch sensation is intact, ankle dorsiflexion/plantar flexion is intact. DP 2+/2 palpable     Neurological:      General: No focal deficit present.      Mental Status: She is alert.   Psychiatric:         Mood and Affect: Mood normal.         Behavior: Behavior normal.         Last Recorded Vitals  Blood pressure 126/62, pulse 70, temperature 36.4 °C (97.5 °F), temperature source Temporal, resp. rate 16, height 1.499 m (4' 11.02\"), weight 58.9 kg (129 lb 13.6 oz), SpO2 93 %.  Intake/Output last 3 Shifts:  No intake/output data recorded.    Relevant Results      Scheduled medications  acetaminophen, 650 mg, oral, q6h [facility] CARMEN  allopurinol, 100 mg, oral, Daily  amLODIPine, 5 mg, oral, Daily  aspirin, 81 mg, oral, BID  budesonide, 0.5 mg, nebulization, BID  formoterol, 20 mcg, nebulization, BID  gabapentin, 100 mg, oral, Nightly  metoprolol tartrate, 25 mg, oral, BID  pantoprazole, 40 mg, oral, Daily  polyethylene glycol, 17 g, oral, Daily  potassium chloride ER, 10 mEq, oral, Daily  rosuvastatin, 5 mg, oral, Nightly      Continuous " medications  oxygen, 2 L/min, Last Rate: Stopped (10/03/23 7755)      PRN medications  PRN medications: cyclobenzaprine, dicyclomine, meclizine, morphine, naloxone, naloxone, oxyCODONE, oxyCODONE, oxyCODONE  No results found for this or any previous visit (from the past 24 hour(s)).                         Assessment/Plan   Active Problems:  There are no active Hospital Problems.    POD #3 s/p left TKA  Continue PT/OT, WBAT left LE  Reviewed am labs  Multimodal pain regimen  Left knee dressing to be removed on post op day # 7  Will discharge home when appropriate with [facility]  Follow up with Dr. Rashid in 2 weeks         I spent 30 minutes in the professional and overall care of this patient.      MITCHELL LaroseC

## 2023-10-06 NOTE — PROGRESS NOTES
Physical Therapy    Physical Therapy    Physical Therapy Treatment    Patient Name: Norma Villasenor  MRN: [id number]  Today's Date: 10/6/2023  Time Calculation  Start Time: 1008  Stop Time: 1038  Time Calculation (min): 30 min       Assessment/Plan     PT Plan  Inpatient/Swing Bed or Outpatient: Inpatient  PT Plan  Treatment/Interventions: Transfer training, Gait training, Stair training  PT Plan: Skilled PT  PT Frequency: BID  PT Discharge Recommendations: Moderate intensity level of continued care  PT Recommended Transfer Status: Assist x1    Current Problem:  1. History of left knee replacement  Referral to Home Health    aspirin 81 mg EC tablet    polyethylene glycol (Glycolax, Miralax) 17 gram packet    acetaminophen (Tylenol) 325 mg tablet    Referral to Home Health    oxyCODONE (Roxicodone) 5 mg immediate release tablet    DISCONTINUED: oxyCODONE (Roxicodone) 5 mg immediate release tablet          General Visit Information:   PT  Visit  PT Received On: 10/06/23  General  Reason for Referral: TKR (Left)  Patient Position Received: Bed, 3 rail up  Preferred Learning Style: verbal, visual  Subjective     Precautions:  Precautions  LE Weight Bearing Status: Weight Bearing as Tolerated  Medical Precautions: Fall precautions  Post-Surgical Precautions: Left total knee precautions    Vital Signs:     Objective     Pain:  Pain Assessment  Pain Assessment: 0-10  Pain Score: 8  Pain Type: Surgical pain  Pain Location: Knee  Pain Interventions: Cold applied    Cognition:  Cognition  Overall Cognitive Status: Within Functional Limits    Postural Control:       Extremity/Trunk Assessments:                Activity Tolerance:       Treatments:  Therapeutic Exercise  Therapeutic Exercise Performed: Yes (Patient needing assistr for active quad exercises)  Therapeutic Exercise Activity 1: AP,QS,GS,SLR,HIP ABD, MARCHING,BALL SQUEEZES X 20 B WITH EMPHASIS ON OPERATED LE  Therapeutic Exercise Activity 2: ROM (08-80)        Bed  Mobility  Bed Mobility: Yes  Bed Mobility 1  Bed Mobility 1: Supine to sitting  Level of Assistance 1: Close supervision (Instructed inuse of gait belt for L LE out of bed)  Ambulation/Gait Training  Ambulation/Gait Training Performed: Yes  Ambulation/Gait Training 1  Surface 1: Level tile  Device 1: Rolling walker  Gait Support Devices: Gait belt  Assistance 1: Contact guard  Quality of Gait 1:  (Step to)  Comments/Distance (ft) 1: 100 x 2 (Patient demos slow antalgic gait with discontinuous steps. Requires hands on assist while up on her feet)  Ambulation/Gait Training 3  Surface 3: Level tile  Device 3: Rolling walker  Gait Support Devices: Gait belt  Assistance 3: Contact guard, Minimum assistance  Comments/Distance (ft) 3:  (step to)  Transfers  Transfer: Yes  Transfer 1  Transfer From 1: Sit to  Transfer to 1: Stand  Transfer Device 1: Gait belt  Transfer Level of Assistance 1: Moderate assistance, Minimum assistance  Stairs  Stairs: Yes  Stairs  Rails 1: Left  Curb Step 1: No  Device 1: Single point cane  Support Devices 1: Gait belt  Assistance 1: Minimum assistance, Moderate assistance  Comment/Number of Steps 1: 3 (Patient needing increased assist this pm for stair training, fatigues with 3 steps has 10 to enter)       Outcome Measures:  [facility] Basic Mobility  Turning from your back to your side while in a flat bed without using bedrails: A little  Moving from lying on your back to sitting on the side of a flat bed without using bedrails: A little  Moving to and from bed to chair (including a wheelchair): A little  Standing up from a chair using your arms (e.g. wheelchair or bedside chair): A little  To walk in hospital room: A little  Climbing 3-5 steps with railing: A lot  Basic Mobility - Total Score: 17  Education Documentation  Body Mechanics, taught by Gina Navarro PTA at 10/6/2023 10:59 AM.  Learner: Patient  Readiness: Acceptance  Method: Explanation, Demonstration  Response: Verbalizes  Understanding, Demonstrated Understanding    Precautions, taught by Gina Navarro PTA at 10/6/2023 10:59 AM.  Learner: Patient  Readiness: Acceptance  Method: Explanation, Demonstration  Response: Verbalizes Understanding, Demonstrated Understanding    ADL Training, taught by Gina Navarro PTA at 10/6/2023 10:59 AM.  Learner: Patient  Readiness: Acceptance  Method: Explanation, Demonstration  Response: Verbalizes Understanding, Demonstrated Understanding    Precautions, taught by Gina Navarro PTA at 10/6/2023 10:59 AM.  Learner: Patient  Readiness: Acceptance  Method: Explanation, Demonstration  Response: Verbalizes Understanding, Demonstrated Understanding    Body Mechanics, taught by Gina Navarro PTA at 10/6/2023 10:59 AM.  Learner: Patient  Readiness: Acceptance  Method: Explanation, Demonstration  Response: Verbalizes Understanding, Demonstrated Understanding    Home Exercise Program, taught by Gina Navarro PTA at 10/6/2023 10:59 AM.  Learner: Patient  Readiness: Acceptance  Method: Explanation, Demonstration  Response: Verbalizes Understanding, Demonstrated Understanding    Mobility Training, taught by Gina Navarro PTA at 10/6/2023 10:59 AM.  Learner: Patient  Readiness: Acceptance  Method: Explanation, Demonstration  Response: Verbalizes Understanding, Demonstrated Understanding    Body Mechanics, taught by Gina Navarro PTA at 10/5/2023  1:30 PM.  Learner: Patient  Readiness: Eager  Method: Explanation, Demonstration  Response: Verbalizes Understanding, Demonstrated Understanding    Precautions, taught by Gina Navarro PTA at 10/5/2023  1:30 PM.  Learner: Patient  Readiness: Eager  Method: Explanation, Demonstration  Response: Verbalizes Understanding, Demonstrated Understanding    ADL Training, taught by Gina Navarro PTA at 10/5/2023  1:30 PM.  Learner: Patient  Readiness: Eager  Method: Explanation, Demonstration  Response: Verbalizes Understanding, Demonstrated  Understanding    Precautions, taught by Gina Navarro PTA at 10/5/2023  1:30 PM.  Learner: Patient  Readiness: Eager  Method: Explanation, Demonstration  Response: Verbalizes Understanding, Demonstrated Understanding    Body Mechanics, taught by Gina Navarro PTA at 10/5/2023  1:30 PM.  Learner: Patient  Readiness: Eager  Method: Explanation, Demonstration  Response: Verbalizes Understanding, Demonstrated Understanding    Home Exercise Program, taught by Gina Navarro PTA at 10/5/2023  1:30 PM.  Learner: Patient  Readiness: Eager  Method: Explanation, Demonstration  Response: Verbalizes Understanding, Demonstrated Understanding    Mobility Training, taught by Gina Navarro PTA at 10/5/2023  1:30 PM.  Learner: Patient  Readiness: Eager  Method: Explanation, Demonstration  Response: Verbalizes Understanding, Demonstrated Understanding    Education Comments  No comments found.        EDUCATION:     Encounter Problems       Encounter Problems (Active)       Balance       STG-Patient will be supervision with assistive device dynamic stand task >5 minutes for ADL completion  (Progressing)       Start:  10/04/23    Expected End:  10/18/23               Mobility       STG-Patient will be SBA with assistive device functional mobility tasks  (Progressing)       Start:  10/04/23    Expected End:  10/18/23               PT Problem       Pt will perform bed mobility with mod I.   (Progressing)       Start:  10/04/23    Expected End:  10/18/23            Pt will complete sit <> stand and bed <> chair transfers with mod I.   (Progressing)       Start:  10/04/23    Expected End:  10/18/23            Pt will ambulate 300 feet mod I with no significant gait deviations.   (Progressing)       Start:  10/04/23    Expected End:  10/18/23            Pt will verbalize and demonstrate understanding of TKA supine/seated exercises.  (Progressing)       Start:  10/04/23    Expected End:  10/18/23            Pt will ascend/descend  at least 9 stairs using rail and cane in order to navigate home environment.   (Progressing)       Start:  10/04/23    Expected End:  10/18/23               Transfers       STG-Patient will be SBA with functional transfers  (Progressing)       Start:  10/04/23    Expected End:  10/18/23

## 2023-10-06 NOTE — CARE PLAN
The patient's goals for the shift include      The clinical goals for the shift include pt to ambulate throughout shift      Problem: Fall/Injury  Goal: Verbalize understanding of risk factor reduction measures to prevent injury from fall in the home  Outcome: Progressing  Goal: Use assistive devices by end of the shift  Outcome: Progressing  Goal: Pace activities to prevent fatigue by end of the shift  Outcome: Progressing     Problem: Pain  Goal: Takes deep breaths with improved pain control throughout the shift  Outcome: Progressing  Goal: Turns in bed with improved pain control throughout the shift  Outcome: Progressing  Goal: Walks with improved pain control throughout the shift  Outcome: Progressing  Goal: Performs ADL's with improved pain control throughout shift  Outcome: Progressing  Goal: Participates in PT with improved pain control throughout the shift  Outcome: Progressing  Goal: Free from opioid side effects throughout the shift  Outcome: Progressing  Goal: Free from acute confusion related to pain meds throughout the shift  Outcome: Progressing     Problem: Infection related to problem list condition  Goal: Infection will resolve through treatment  Outcome: Progressing     Problem: Skin  Goal: Participates in plan/prevention/treatment measures  Outcome: Progressing  Goal: Prevent/minimize sheer/friction injuries  Outcome: Progressing  Goal: Promote/optimize nutrition  Outcome: Progressing  Goal: Promote skin healing  Outcome: Progressing     Problem: Safety  Goal: Patient will be injury free during hospitalization  Outcome: Progressing  Goal: I will remain free of falls  Outcome: Progressing     Problem: Daily Care  Goal: Daily care needs are met  Outcome: Progressing     Problem: Psychosocial Needs  Goal: Demonstrates ability to cope with hospitalization/illness  Outcome: Progressing  Goal: Collaborate with me, my family, and caregiver to identify my specific goals  Outcome: Progressing     Problem:  Discharge Barriers  Goal: My discharge needs are met  Outcome: Progressing     Problem: Safety - Adult  Goal: Free from fall injury  Outcome: Progressing     Problem: Discharge Planning  Goal: Discharge to home or other facility with appropriate resources  Outcome: Progressing     Problem: Chronic Conditions and Co-morbidities  Goal: Patient's chronic conditions and co-morbidity symptoms are monitored and maintained or improved  Outcome: Progressing     Problem: Dressings Lower Extremities  Goal: STG - Patient will complete lower body dressing with SBA with compensatory strategies and AE  Outcome: Progressing

## 2023-10-07 VITALS
RESPIRATION RATE: 16 BRPM | TEMPERATURE: 97.9 F | HEIGHT: 59 IN | WEIGHT: 129.85 LBS | HEART RATE: 70 BPM | DIASTOLIC BLOOD PRESSURE: 61 MMHG | SYSTOLIC BLOOD PRESSURE: 143 MMHG | BODY MASS INDEX: 26.18 KG/M2 | OXYGEN SATURATION: 96 %

## 2023-10-07 LAB
ALBUMIN SERPL BCP-MCNC: 3.4 G/DL (ref 3.4–5)
ANION GAP SERPL CALC-SCNC: 13 MMOL/L (ref 10–20)
BUN SERPL-MCNC: 21 MG/DL (ref 6–23)
CALCIUM SERPL-MCNC: 9.3 MG/DL (ref 8.6–10.3)
CHLORIDE SERPL-SCNC: 100 MMOL/L (ref 98–107)
CO2 SERPL-SCNC: 27 MMOL/L (ref 21–32)
CREAT SERPL-MCNC: 0.89 MG/DL (ref 0.5–1.05)
ERYTHROCYTE [DISTWIDTH] IN BLOOD BY AUTOMATED COUNT: 13.2 % (ref 11.5–14.5)
GFR SERPL CREATININE-BSD FRML MDRD: 65 ML/MIN/1.73M*2
GLUCOSE SERPL-MCNC: 182 MG/DL (ref 74–99)
HCT VFR BLD AUTO: 33.3 % (ref 36–46)
HGB BLD-MCNC: 10.5 G/DL (ref 12–16)
MAGNESIUM SERPL-MCNC: 1.7 MG/DL (ref 1.6–2.4)
MCH RBC QN AUTO: 29.6 PG (ref 26–34)
MCHC RBC AUTO-ENTMCNC: 31.5 G/DL (ref 32–36)
MCV RBC AUTO: 94 FL (ref 80–100)
NRBC BLD-RTO: 0 /100 WBCS (ref 0–0)
PHOSPHATE SERPL-MCNC: 2.6 MG/DL (ref 2.5–4.9)
PLATELET # BLD AUTO: 297 X10*3/UL (ref 150–450)
PMV BLD AUTO: 9.5 FL (ref 7.5–11.5)
POTASSIUM SERPL-SCNC: 4.1 MMOL/L (ref 3.5–5.3)
RBC # BLD AUTO: 3.55 X10*6/UL (ref 4–5.2)
SODIUM SERPL-SCNC: 136 MMOL/L (ref 136–145)
WBC # BLD AUTO: 8 X10*3/UL (ref 4.4–11.3)

## 2023-10-07 PROCEDURE — 97110 THERAPEUTIC EXERCISES: CPT | Mod: GP

## 2023-10-07 PROCEDURE — 2500000001 HC RX 250 WO HCPCS SELF ADMINISTERED DRUGS (ALT 637 FOR MEDICARE OP): Performed by: ORTHOPAEDIC SURGERY

## 2023-10-07 PROCEDURE — 85027 COMPLETE CBC AUTOMATED: CPT | Performed by: PHYSICIAN ASSISTANT

## 2023-10-07 PROCEDURE — 83735 ASSAY OF MAGNESIUM: CPT | Performed by: PHYSICIAN ASSISTANT

## 2023-10-07 PROCEDURE — 2500000001 HC RX 250 WO HCPCS SELF ADMINISTERED DRUGS (ALT 637 FOR MEDICARE OP): Performed by: PHYSICIAN ASSISTANT

## 2023-10-07 PROCEDURE — 2500000002 HC RX 250 W HCPCS SELF ADMINISTERED DRUGS (ALT 637 FOR MEDICARE OP, ALT 636 FOR OP/ED): Performed by: PHYSICIAN ASSISTANT

## 2023-10-07 PROCEDURE — 97116 GAIT TRAINING THERAPY: CPT | Mod: GP

## 2023-10-07 PROCEDURE — 97535 SELF CARE MNGMENT TRAINING: CPT | Mod: GO

## 2023-10-07 PROCEDURE — 97530 THERAPEUTIC ACTIVITIES: CPT | Mod: GO

## 2023-10-07 PROCEDURE — G0378 HOSPITAL OBSERVATION PER HR: HCPCS

## 2023-10-07 PROCEDURE — 82565 ASSAY OF CREATININE: CPT | Performed by: PHYSICIAN ASSISTANT

## 2023-10-07 PROCEDURE — 84100 ASSAY OF PHOSPHORUS: CPT | Performed by: PHYSICIAN ASSISTANT

## 2023-10-07 PROCEDURE — 94640 AIRWAY INHALATION TREATMENT: CPT

## 2023-10-07 PROCEDURE — 36415 COLL VENOUS BLD VENIPUNCTURE: CPT | Performed by: PHYSICIAN ASSISTANT

## 2023-10-07 PROCEDURE — 2500000004 HC RX 250 GENERAL PHARMACY W/ HCPCS (ALT 636 FOR OP/ED): Performed by: PHYSICIAN ASSISTANT

## 2023-10-07 RX ADMIN — ALLOPURINOL 100 MG: 100 TABLET ORAL at 08:56

## 2023-10-07 RX ADMIN — FORMOTEROL FUMARATE 20 MCG: 20 SOLUTION RESPIRATORY (INHALATION) at 08:26

## 2023-10-07 RX ADMIN — POTASSIUM CHLORIDE 10 MEQ: 750 CAPSULE, EXTENDED RELEASE ORAL at 10:13

## 2023-10-07 RX ADMIN — ACETAMINOPHEN 325MG 650 MG: 325 TABLET ORAL at 12:59

## 2023-10-07 RX ADMIN — PANTOPRAZOLE SODIUM 40 MG: 40 TABLET, DELAYED RELEASE ORAL at 08:56

## 2023-10-07 RX ADMIN — OXYCODONE HYDROCHLORIDE 5 MG: 5 TABLET ORAL at 08:55

## 2023-10-07 RX ADMIN — ACETAMINOPHEN 325MG 650 MG: 325 TABLET ORAL at 06:41

## 2023-10-07 RX ADMIN — METOPROLOL TARTRATE 25 MG: 25 TABLET, FILM COATED ORAL at 08:56

## 2023-10-07 RX ADMIN — BUDESONIDE 0.5 MG: 0.5 INHALANT RESPIRATORY (INHALATION) at 08:26

## 2023-10-07 RX ADMIN — ASPIRIN 81 MG: 81 TABLET, COATED ORAL at 08:56

## 2023-10-07 RX ADMIN — AMLODIPINE BESYLATE 5 MG: 5 TABLET ORAL at 08:56

## 2023-10-07 ASSESSMENT — COGNITIVE AND FUNCTIONAL STATUS - GENERAL
MOBILITY SCORE: 20
PERSONAL GROOMING: A LITTLE
TOILETING: A LITTLE
WALKING IN HOSPITAL ROOM: A LITTLE
MOVING TO AND FROM BED TO CHAIR: A LITTLE
DRESSING REGULAR LOWER BODY CLOTHING: A LOT
HELP NEEDED FOR BATHING: A LITTLE
STANDING UP FROM CHAIR USING ARMS: A LITTLE
DRESSING REGULAR UPPER BODY CLOTHING: A LITTLE
CLIMB 3 TO 5 STEPS WITH RAILING: A LITTLE
DAILY ACTIVITIY SCORE: 18

## 2023-10-07 ASSESSMENT — PAIN - FUNCTIONAL ASSESSMENT
PAIN_FUNCTIONAL_ASSESSMENT: 0-10

## 2023-10-07 ASSESSMENT — PAIN SCALES - GENERAL
PAINLEVEL_OUTOF10: 2
PAINLEVEL_OUTOF10: 2
PAINLEVEL_OUTOF10: 6
PAINLEVEL_OUTOF10: 2

## 2023-10-07 ASSESSMENT — ACTIVITIES OF DAILY LIVING (ADL): HOME_MANAGEMENT_TIME_ENTRY: 15

## 2023-10-07 NOTE — PROGRESS NOTES
Physical Therapy    Physical Therapy Treatment    Patient Name: Norma Villasenor  MRN: [id number]  Today's Date: 10/7/2023  Time Calculation  Start Time: 0930  Stop Time: 1000  Time Calculation (min): 30 min       Assessment/Plan   PT Assessment  PT Assessment Results: Decreased endurance (Required a rest break post amnulation)  Rehab Prognosis: Good  Evaluation/Treatment Tolerance: Patient tolerated treatment well  Medical Staff Made Aware: Yes  Barriers to Participation: Other (Comment) (None)  End of Session Communication: Bedside nurse  Assessment Comment:   End of Session Patient Position: Up in chair (OT present)  PT Plan  Inpatient/Swing Bed or Outpatient: Inpatient  PT Plan  Treatment/Interventions: Transfer training, Gait training, Stair training  PT Plan: Skilled PT  PT Frequency: BID  PT Discharge Recommendations: Moderate intensity level of continued care  PT Recommended Transfer Status: Assist x1      General Visit Information:           Subjective   Precautions:  Precautions  LE Weight Bearing Status: Weight Bearing as Tolerated  Medical Precautions: Fall precautions  Post-Surgical Precautions: Left total knee precautions  Precautions Comment: knee precautions  Vital Signs:       Objective   Pain:  Pain Assessment  Pain Assessment: 0-10  Pain Score: 2  Pain Type:  (Pulling)  Pain Location: Knee  Pain Frequency: Intermittent  Cognition:  Cognition  Overall Cognitive Status: Within Functional Limits  Safety/Judgement: Within Functional Limits  Postural Control:     Extremity/Trunk Assessments:                 AROM LLE (degrees)  L Knee Flexion 0-130: 85  Activity Tolerance:     Treatments:  Therapeutic Exercise  Therapeutic Exercise Performed: Yes  Therapeutic Exercise Activity 1: AP,QS,GS,SLR,HIP ABD, MARCHING,BALL SQUEEZES X 20 B WITH EMPHASIS ON OPERATED LE                                  Ambulation/Gait Training  Ambulation/Gait Training Performed: Yes  Ambulation/Gait Training 1  Surface 1: Level  tile  Device 1: Rolling walker  Gait Support Devices: Gait belt  Assistance 1: Contact guard  Quality of Gait 1: Diminished heel strike  Comments/Distance (ft) 1: 100 (c 2 turns)  Transfer 1  Transfer From 1: Sit to, Chair with arms to  Transfer to 1: Stand (FWW)  Technique 1: Sit to stand  Transfer Device 1: Walker  Transfer Level of Assistance 1: Contact guard  Trials/Comments 1:  (pushingup fromchair arms)  Transfers 2  Transfer From 2: Stand to  Transfer to 2: Sit  Technique 2: Stand to sit  Transfer Device 2: Gait belt (from FWW to chair)  Transfer Level of Assistance 2: Contact guard  Trials/Comments 2:  (Reaching back one had at a time /slow controlled sitting (no plopping ))    Stairs  Stairs: Yes  Stairs  Rails 1: Left (ascending /R rsil descending)  Device 1: Single point cane  Assistance 1: Minimum assistance (For support while descending)  Comment/Number of Steps 1: 3                Outcome Measures:             [facility] Basic Mobility  Turning from your back to your side while in a flat bed without using bedrails: None  Moving from lying on your back to sitting on the side of a flat bed without using bedrails: None  Moving to and from bed to chair (including a wheelchair): A little  Standing up from a chair using your arms (e.g. wheelchair or bedside chair): A little  To walk in hospital room: A little  Climbing 3-5 steps with railing: A little  Basic Mobility - Total Score: 20                                                                   Education Documentation  Precautions, taught by Diya Heck PTA at 10/7/2023 11:59 AM.  Learner: Patient  Readiness: Eager  Method: Explanation  Response: Verbalizes Understanding, Demonstrated Understanding  Comment: Proper sequencing to reduce fall risk    Education Comments  No comments found.        OP EDUCATION:  Education  Individual(s) Educated: Patient  Education Provided: Other (Educated patient on proper sequencing on stairs .)  Patient/Caregiver  Demonstrated Understanding: yes  Patient Response to Education: Patient/Caregiver Verbalized Understanding of Information (and  performed)    Encounter Problems       Encounter Problems (Active)       Balance       STG-Patient will be supervision with assistive device dynamic stand task >5 minutes for ADL completion  (Progressing)       Start:  10/04/23    Expected End:  10/18/23               Mobility       STG-Patient will be SBA with assistive device functional mobility tasks  (Progressing)       Start:  10/04/23    Expected End:  10/18/23               PT Problem       Pt will perform bed mobility with mod I.   (Progressing)       Start:  10/04/23    Expected End:  10/18/23            Pt will complete sit <> stand and bed <> chair transfers with mod I.   (Progressing)       Start:  10/04/23    Expected End:  10/18/23            Pt will ambulate 300 feet mod I with no significant gait deviations.   (Progressing)       Start:  10/04/23    Expected End:  10/18/23            Pt will verbalize and demonstrate understanding of TKA supine/seated exercises.  (Progressing)       Start:  10/04/23    Expected End:  10/18/23            Pt will ascend/descend at least 9 stairs using rail and cane in order to navigate home environment.   (Progressing)       Start:  10/04/23    Expected End:  10/18/23               Transfers       STG-Patient will be SBA with functional transfers  (Progressing)       Start:  10/04/23    Expected End:  10/18/23

## 2023-10-07 NOTE — PROGRESS NOTES
Occupational Therapy    OT Treatment    Patient Name: Norma Villasenor  MRN: [id number]  Today's Date: 10/7/2023  Time Calculation  Start Time: 0954  Stop Time: 1020  Time Calculation (min): 26 min         Assessment:  Prognosis: Good  Evaluation/Treatment Tolerance: Patient tolerated treatment well  OT Assessment Results: Decreased ADL status, Decreased functional mobility, Decreased safe judgment during ADL  Prognosis: Good  Evaluation/Treatment Tolerance: Patient tolerated treatment well    Plan:  Treatment Interventions: ADL retraining, Endurance training, Functional transfer training, Equipment evaluation/education  OT Frequency: 1 time per day until discharge  OT Discharge Recommendations: Moderate intensity level of continued care  Treatment Interventions: ADL retraining, Endurance training, Functional transfer training, Equipment evaluation/education  Subjective     Current Problem:  1. History of left knee replacement  Referral to Home Health    aspirin 81 mg EC tablet    polyethylene glycol (Glycolax, Miralax) 17 gram packet    acetaminophen (Tylenol) 325 mg tablet    Referral to Home Health    oxyCODONE (Roxicodone) 5 mg immediate release tablet    DISCONTINUED: oxyCODONE (Roxicodone) 5 mg immediate release tablet    DISCONTINUED: oxyCODONE (Roxicodone) 5 mg immediate release tablet    CANCELED: Referral to Home Health          General:  OT Received On: 10/07/23  Reason for Referral: TKR  Prior to Session Communication: Bedside nurse  Patient Position Received:  (Pt in rehab gym, just finishing PT session)  Preferred Learning Style: verbal, visual  General Comment: Pt agreeable to OT, nursing cleared for treatment. pt is [place] [employer] [place].    Vital Signs:       Pain:  Pain Assessment  Pain Assessment: 0-10  Pain Score: 2  Pain Location: Knee  Pain Orientation: Left  Pain Frequency: Intermittent  Pain Interventions: Repositioned  Response to Interventions: OT to tolerance  Objective      Activities of Daily Living:     Grooming  Grooming Comments: Pt stood at sink to wash hands at SBA           LE Dressing  LE Dressing Where Assessed: Chair  LE Dressing Comments: Instruction on use of LBAE. Pt declined using LBAE and was able to thread LLE first and then RLE . min/mod A to don pants with a device  Toileting  Toileting Level of Assistance: Close supervision  Where Assessed: Toilet    Functional Standing Tolerance:  Functional Standing Tolerance Comments: Pt ambulate to and fronm the rehab gym at [facility] with [facility]    Bed Mobility/Transfers:       Toilet Transfers  Toilet Transfers: Minimal assistance             Therapy/Activity:               Strength:       Other Activity:       Outcome Measures:[facility] Daily Activity  Putting on and taking off regular lower body clothing: A lot  Bathing (including washing, rinsing, drying): A little  Putting on and taking off regular upper body clothing: A little  Toileting, which includes using toilet, bedpan or urinal: A little  Taking care of personal grooming such as brushing teeth: A little  Eating Meals: None  Daily Activity - Total Score: 18        EDUCATION:  Education  Individual(s) Educated: Patient  Education Provided: Fall precautons  Diagnosis and Precautions: knee precautions, LBAE  instruction  Risk and Benefits Discussed with Patient/Caregiver/Other: yes  Patient/Caregiver Demonstrated Understanding: yes  Plan of Care Discussed and Agreed Upon: yes  Patient Response to Education: Patient/Caregiver Verbalized Understanding of Information    Goals:  Encounter Problems       Encounter Problems (Active)       Balance       STG-Patient will be supervision with assistive device dynamic stand task >5 minutes for ADL completion  (Progressing)       Start:  10/04/23    Expected End:  10/18/23               Dressings Lower Extremities       STG - Patient will complete lower body dressing with SBA with compensatory strategies and AE (Progressing)       Start:  10/04/23    Expected End:   10/18/23               Mobility       STG-Patient will be SBA with assistive device functional mobility tasks  (Progressing)       Start:  10/04/23    Expected End:  10/18/23               Transfers       STG-Patient will be SBA with functional transfers  (Progressing)       Start:  10/04/23    Expected End:  10/18/23

## 2023-10-07 NOTE — NURSING NOTE
Pt discharged at this time to [facility].  Taken via stretcher.  Son took personal belongings.

## 2023-10-07 NOTE — PROGRESS NOTES
Ready for discharge. Transport scheduled for [id number] to [facility]. Facility and son notified. DCS sent PASSR and Jay.      Brandi Cotto RN

## 2023-10-07 NOTE — NURSING NOTE
Attempted to call [facility] to give report several times.  Did finally speak to Valentin who took number for hospital and will try to find out who will be taking pt.

## 2023-10-07 NOTE — PROGRESS NOTES
"Norma Villasenor is a 82 y.o. female on day 0 of admission presenting with Arthritis of knee, degenerative.    Subjective   Patient doing well sitting up in chair all dressed awaiting transport to [facility]  Son is at her bedside   Denies any significant pain or drainage   Denies any fever, chills, HA, chest pain, SOB, cough, abdominal pain, urinary issues, n/v/d    Scheduled medications  acetaminophen, 650 mg, oral, q6h MARIA DEL CARMEN  allopurinol, 100 mg, oral, Daily  amLODIPine, 5 mg, oral, Daily  aspirin, 81 mg, oral, BID  budesonide, 0.5 mg, nebulization, BID  formoterol, 20 mcg, nebulization, BID  gabapentin, 100 mg, oral, Nightly  metoprolol tartrate, 25 mg, oral, BID  pantoprazole, 40 mg, oral, Daily  polyethylene glycol, 17 g, oral, Daily  potassium chloride ER, 10 mEq, oral, Daily  rosuvastatin, 5 mg, oral, Nightly       PRN medications  PRN medications: cyclobenzaprine, dicyclomine, meclizine, morphine, naloxone, naloxone, oxyCODONE, oxyCODONE, oxyCODONE  No results found for this or any previous visit (from the past 24 hour(s)).       Objective     Physical Exam  Constitutional:       Appearance: Normal appearance.   HENT:      Head: Normocephalic.      Nose: Nose normal.     Mouth: Mucous membranes are moist.       Eyes:      Extraocular Movements: Extraocular movements intact.   Pulmonary:      Effort: Pulmonary effort is normal.   Abdominal:      Palpations: Abdomen is soft.   Musculoskeletal:      Comments: Left knee dressing is dry and intact.  light touch sensation is intact, ankle dorsiflexion/plantar flexion is intact. DP 2+/2 palpable     Neurological:      General: No focal deficit present.      Mental Status: She is alert.   Psychiatric:         Mood and Affect: Mood normal.         Behavior: Behavior normal.   Last Recorded Vitals  Blood pressure 143/61, pulse 70, temperature 36.6 °C (97.9 °F), temperature source Temporal, resp. rate 16, height 1.499 m (4' 11.02\"), weight 58.9 kg (129 lb 13.6 oz), " SpO2 96 %.  Intake/Output last 3 Shifts:  I/O last 3 completed shifts:  In: 480 (8.1 mL/kg) [P.O.:480]  Out: - (0 mL/kg)   Weight: 58.9 kg     Relevant Results           Results for orders placed or performed during the hospital encounter of 10/03/23 (from the past 24 hour(s))   CBC   Result Value Ref Range    WBC 8.0 4.4 - 11.3 x10*3/uL    nRBC 0.0 0.0 - 0.0 /100 WBCs    RBC 3.55 (L) 4.00 - 5.20 x10*6/uL    Hemoglobin 10.5 (L) 12.0 - 16.0 g/dL    Hematocrit 33.3 (L) 36.0 - 46.0 %    MCV 94 80 - 100 fL    MCH 29.6 26.0 - 34.0 pg    MCHC 31.5 (L) 32.0 - 36.0 g/dL    RDW 13.2 11.5 - 14.5 %    Platelets 297 150 - 450 x10*3/uL    MPV 9.5 7.5 - 11.5 fL   Renal function panel   Result Value Ref Range    Glucose 182 (H) 74 - 99 mg/dL    Sodium 136 136 - 145 mmol/L    Potassium 4.1 3.5 - 5.3 mmol/L    Chloride 100 98 - 107 mmol/L    Bicarbonate 27 21 - 32 mmol/L    Anion Gap 13 10 - 20 mmol/L    Urea Nitrogen 21 6 - 23 mg/dL    Creatinine 0.89 0.50 - 1.05 mg/dL    eGFR 65 >60 mL/min/1.73m*2    Calcium 9.3 8.6 - 10.3 mg/dL    Phosphorus 2.6 2.5 - 4.9 mg/dL    Albumin 3.4 3.4 - 5.0 g/dL   Magnesium   Result Value Ref Range    Magnesium 1.70 1.60 - 2.40 mg/dL    No results found.                     Assessment/Plan     Hospital Problem: Unilateral primary osteoarthritis, left knee     POD #4 s/p left TKA  Continue PT/OT, WBAT left LE  Reviewed am labs  Multimodal pain regimen  Left knee dressing to be removed on post op day # 7  Will discharge to SNF  Follow up with Dr. Rashid in 2 weeks post discharge           I spent 20 minutes in the professional and overall care of this patient.      Gabby Lundy PA-C

## 2023-10-07 NOTE — CARE PLAN
Problem: Fall/Injury  Goal: Verbalize understanding of risk factor reduction measures to prevent injury from fall in the home  Outcome: Progressing  Goal: Use assistive devices by end of the shift  Outcome: Progressing  Goal: Pace activities to prevent fatigue by end of the shift  Outcome: Progressing     Problem: Pain  Goal: Takes deep breaths with improved pain control throughout the shift  Outcome: Progressing  Goal: Turns in bed with improved pain control throughout the shift  Outcome: Progressing  Goal: Walks with improved pain control throughout the shift  Outcome: Progressing  Goal: Performs ADL's with improved pain control throughout shift  Outcome: Progressing  Goal: Participates in PT with improved pain control throughout the shift  Outcome: Progressing  Goal: Free from acute confusion related to pain meds throughout the shift  Outcome: Progressing     Problem: Safety  Goal: Patient will be injury free during hospitalization  Outcome: Progressing  Goal: I will remain free of falls  Outcome: Progressing     Problem: Daily Care  Goal: Daily care needs are met  Outcome: Progressing     Problem: Safety - Adult  Goal: Free from fall injury  Outcome: Progressing   The patient's goals for the shift include      The clinical goals for the shift include pain amangement and ambulation     Barriers to progression include age related changes. Recommendations to address these barriers include reinforcement.

## 2023-10-07 NOTE — CARE PLAN
The patient's goals for the shift include      The clinical goals for the shift include Be free from falls for remainder of shift.    Over the shift, the patient did make progress toward goals.  Was safely discharged via stretcher at this time.

## 2023-10-16 ENCOUNTER — PREP FOR PROCEDURE (OUTPATIENT)
Dept: ORTHOPEDIC SURGERY | Facility: HOSPITAL | Age: 83
End: 2023-10-16
Payer: MEDICARE

## 2025-02-07 ENCOUNTER — APPOINTMENT (OUTPATIENT)
Dept: CARDIOLOGY | Facility: HOSPITAL | Age: 85
DRG: 193 | End: 2025-02-07
Payer: MEDICARE

## 2025-02-07 ENCOUNTER — HOSPITAL ENCOUNTER (INPATIENT)
Facility: HOSPITAL | Age: 85
End: 2025-02-07
Attending: EMERGENCY MEDICINE | Admitting: INTERNAL MEDICINE
Payer: MEDICARE

## 2025-02-07 ENCOUNTER — APPOINTMENT (OUTPATIENT)
Dept: RADIOLOGY | Facility: HOSPITAL | Age: 85
DRG: 193 | End: 2025-02-07
Payer: MEDICARE

## 2025-02-07 DIAGNOSIS — R94.31 ABNORMAL ELECTROCARDIOGRAM (ECG) (EKG): ICD-10-CM

## 2025-02-07 DIAGNOSIS — I48.91 ATRIAL FIBRILLATION, UNSPECIFIED TYPE (MULTI): ICD-10-CM

## 2025-02-07 DIAGNOSIS — R07.9 CHEST PAIN, UNSPECIFIED TYPE: Primary | ICD-10-CM

## 2025-02-07 DIAGNOSIS — N17.9 AKI (ACUTE KIDNEY INJURY) (CMS-HCC): ICD-10-CM

## 2025-02-07 DIAGNOSIS — J10.1 INFLUENZA A: ICD-10-CM

## 2025-02-07 LAB
ALBUMIN SERPL BCP-MCNC: 4.2 G/DL (ref 3.4–5)
ALP SERPL-CCNC: 51 U/L (ref 33–136)
ALT SERPL W P-5'-P-CCNC: 16 U/L (ref 7–45)
ANION GAP SERPL CALC-SCNC: 14 MMOL/L (ref 10–20)
ANION GAP SERPL CALC-SCNC: 15 MMOL/L (ref 10–20)
AST SERPL W P-5'-P-CCNC: 20 U/L (ref 9–39)
ATRIAL RATE: 67 BPM
BASOPHILS # BLD AUTO: 0.01 X10*3/UL (ref 0–0.1)
BASOPHILS NFR BLD AUTO: 0.2 %
BILIRUB SERPL-MCNC: 0.3 MG/DL (ref 0–1.2)
BNP SERPL-MCNC: 89 PG/ML (ref 0–99)
BUN SERPL-MCNC: 40 MG/DL (ref 6–23)
BUN SERPL-MCNC: 52 MG/DL (ref 6–23)
CALCIUM SERPL-MCNC: 10 MG/DL (ref 8.6–10.3)
CALCIUM SERPL-MCNC: 9.3 MG/DL (ref 8.6–10.3)
CARDIAC TROPONIN I PNL SERPL HS: 15 NG/L (ref 0–13)
CARDIAC TROPONIN I PNL SERPL HS: 19 NG/L (ref 0–13)
CHLORIDE SERPL-SCNC: 103 MMOL/L (ref 98–107)
CHLORIDE SERPL-SCNC: 104 MMOL/L (ref 98–107)
CO2 SERPL-SCNC: 23 MMOL/L (ref 21–32)
CO2 SERPL-SCNC: 24 MMOL/L (ref 21–32)
CREAT SERPL-MCNC: 1.08 MG/DL (ref 0.5–1.05)
CREAT SERPL-MCNC: 1.63 MG/DL (ref 0.5–1.05)
EGFRCR SERPLBLD CKD-EPI 2021: 31 ML/MIN/1.73M*2
EGFRCR SERPLBLD CKD-EPI 2021: 51 ML/MIN/1.73M*2
EOSINOPHIL # BLD AUTO: 0.01 X10*3/UL (ref 0–0.4)
EOSINOPHIL NFR BLD AUTO: 0.2 %
ERYTHROCYTE [DISTWIDTH] IN BLOOD BY AUTOMATED COUNT: 12.4 % (ref 11.5–14.5)
FLUAV RNA RESP QL NAA+PROBE: DETECTED
FLUBV RNA RESP QL NAA+PROBE: NOT DETECTED
GLUCOSE SERPL-MCNC: 108 MG/DL (ref 74–99)
GLUCOSE SERPL-MCNC: 194 MG/DL (ref 74–99)
HCT VFR BLD AUTO: 41.1 % (ref 36–46)
HGB BLD-MCNC: 13.4 G/DL (ref 12–16)
IMM GRANULOCYTES # BLD AUTO: 0.02 X10*3/UL (ref 0–0.5)
IMM GRANULOCYTES NFR BLD AUTO: 0.4 % (ref 0–0.9)
LYMPHOCYTES # BLD AUTO: 1.4 X10*3/UL (ref 0.8–3)
LYMPHOCYTES NFR BLD AUTO: 31 %
MCH RBC QN AUTO: 29.5 PG (ref 26–34)
MCHC RBC AUTO-ENTMCNC: 32.6 G/DL (ref 32–36)
MCV RBC AUTO: 90 FL (ref 80–100)
MONOCYTES # BLD AUTO: 0.67 X10*3/UL (ref 0.05–0.8)
MONOCYTES NFR BLD AUTO: 14.8 %
NEUTROPHILS # BLD AUTO: 2.41 X10*3/UL (ref 1.6–5.5)
NEUTROPHILS NFR BLD AUTO: 53.4 %
NRBC BLD-RTO: 0 /100 WBCS (ref 0–0)
P AXIS: 62 DEGREES
P OFFSET: 209 MS
P ONSET: 155 MS
PLATELET # BLD AUTO: 226 X10*3/UL (ref 150–450)
POTASSIUM SERPL-SCNC: 3.6 MMOL/L (ref 3.5–5.3)
POTASSIUM SERPL-SCNC: 3.7 MMOL/L (ref 3.5–5.3)
PR INTERVAL: 132 MS
PROT SERPL-MCNC: 7.9 G/DL (ref 6.4–8.2)
Q ONSET: 221 MS
QRS COUNT: 11 BEATS
QRS DURATION: 88 MS
QT INTERVAL: 410 MS
QTC CALCULATION(BAZETT): 433 MS
QTC FREDERICIA: 425 MS
R AXIS: 54 DEGREES
RBC # BLD AUTO: 4.55 X10*6/UL (ref 4–5.2)
RSV RNA RESP QL NAA+PROBE: NOT DETECTED
SARS-COV-2 RNA RESP QL NAA+PROBE: NOT DETECTED
SODIUM SERPL-SCNC: 137 MMOL/L (ref 136–145)
SODIUM SERPL-SCNC: 138 MMOL/L (ref 136–145)
T AXIS: 25 DEGREES
T OFFSET: 426 MS
VENTRICULAR RATE: 67 BPM
WBC # BLD AUTO: 4.5 X10*3/UL (ref 4.4–11.3)

## 2025-02-07 PROCEDURE — 94640 AIRWAY INHALATION TREATMENT: CPT

## 2025-02-07 PROCEDURE — 93005 ELECTROCARDIOGRAM TRACING: CPT

## 2025-02-07 PROCEDURE — 96360 HYDRATION IV INFUSION INIT: CPT | Mod: 59

## 2025-02-07 PROCEDURE — 2500000004 HC RX 250 GENERAL PHARMACY W/ HCPCS (ALT 636 FOR OP/ED): Performed by: INTERNAL MEDICINE

## 2025-02-07 PROCEDURE — 84484 ASSAY OF TROPONIN QUANT: CPT | Performed by: EMERGENCY MEDICINE

## 2025-02-07 PROCEDURE — 2500000001 HC RX 250 WO HCPCS SELF ADMINISTERED DRUGS (ALT 637 FOR MEDICARE OP): Performed by: INTERNAL MEDICINE

## 2025-02-07 PROCEDURE — G0378 HOSPITAL OBSERVATION PER HR: HCPCS

## 2025-02-07 PROCEDURE — 87637 SARSCOV2&INF A&B&RSV AMP PRB: CPT

## 2025-02-07 PROCEDURE — 83880 ASSAY OF NATRIURETIC PEPTIDE: CPT | Performed by: EMERGENCY MEDICINE

## 2025-02-07 PROCEDURE — 85025 COMPLETE CBC W/AUTO DIFF WBC: CPT | Performed by: EMERGENCY MEDICINE

## 2025-02-07 PROCEDURE — 71045 X-RAY EXAM CHEST 1 VIEW: CPT | Mod: FOREIGN READ | Performed by: RADIOLOGY

## 2025-02-07 PROCEDURE — 71045 X-RAY EXAM CHEST 1 VIEW: CPT

## 2025-02-07 PROCEDURE — 99285 EMERGENCY DEPT VISIT HI MDM: CPT | Performed by: EMERGENCY MEDICINE

## 2025-02-07 PROCEDURE — 96372 THER/PROPH/DIAG INJ SC/IM: CPT | Performed by: INTERNAL MEDICINE

## 2025-02-07 PROCEDURE — 80048 BASIC METABOLIC PNL TOTAL CA: CPT | Mod: CCI | Performed by: INTERNAL MEDICINE

## 2025-02-07 PROCEDURE — 93010 ELECTROCARDIOGRAM REPORT: CPT | Performed by: EMERGENCY MEDICINE

## 2025-02-07 PROCEDURE — 96361 HYDRATE IV INFUSION ADD-ON: CPT

## 2025-02-07 PROCEDURE — 2500000002 HC RX 250 W HCPCS SELF ADMINISTERED DRUGS (ALT 637 FOR MEDICARE OP, ALT 636 FOR OP/ED)

## 2025-02-07 PROCEDURE — 85025 COMPLETE CBC W/AUTO DIFF WBC: CPT

## 2025-02-07 PROCEDURE — 99222 1ST HOSP IP/OBS MODERATE 55: CPT | Performed by: INTERNAL MEDICINE

## 2025-02-07 PROCEDURE — 2500000004 HC RX 250 GENERAL PHARMACY W/ HCPCS (ALT 636 FOR OP/ED)

## 2025-02-07 PROCEDURE — 36415 COLL VENOUS BLD VENIPUNCTURE: CPT | Performed by: EMERGENCY MEDICINE

## 2025-02-07 PROCEDURE — 36415 COLL VENOUS BLD VENIPUNCTURE: CPT

## 2025-02-07 PROCEDURE — 99285 EMERGENCY DEPT VISIT HI MDM: CPT

## 2025-02-07 PROCEDURE — 2500000002 HC RX 250 W HCPCS SELF ADMINISTERED DRUGS (ALT 637 FOR MEDICARE OP, ALT 636 FOR OP/ED): Performed by: INTERNAL MEDICINE

## 2025-02-07 PROCEDURE — 80053 COMPREHEN METABOLIC PANEL: CPT | Performed by: EMERGENCY MEDICINE

## 2025-02-07 PROCEDURE — 84075 ASSAY ALKALINE PHOSPHATASE: CPT | Performed by: EMERGENCY MEDICINE

## 2025-02-07 RX ORDER — OSELTAMIVIR PHOSPHATE 30 MG/1
30 CAPSULE ORAL DAILY
Status: DISCONTINUED | OUTPATIENT
Start: 2025-02-07 | End: 2025-02-07

## 2025-02-07 RX ORDER — ROSUVASTATIN CALCIUM 5 MG/1
5 TABLET, COATED ORAL NIGHTLY
Status: DISPENSED | OUTPATIENT
Start: 2025-02-07

## 2025-02-07 RX ORDER — CALCITRIOL 0.25 UG/1
0.25 CAPSULE ORAL DAILY
Status: DISPENSED | OUTPATIENT
Start: 2025-02-08

## 2025-02-07 RX ORDER — CALCITRIOL 0.25 UG/1
0.25 CAPSULE ORAL DAILY
Status: ON HOLD | COMMUNITY

## 2025-02-07 RX ORDER — OSELTAMIVIR PHOSPHATE 30 MG/1
30 CAPSULE ORAL DAILY
Status: DISPENSED | OUTPATIENT
Start: 2025-02-08 | End: 2025-02-12

## 2025-02-07 RX ORDER — ACETAMINOPHEN 325 MG/1
650 TABLET ORAL EVERY 6 HOURS PRN
Status: ACTIVE | OUTPATIENT
Start: 2025-02-07

## 2025-02-07 RX ORDER — POLYETHYLENE GLYCOL 3350 17 G/17G
17 POWDER, FOR SOLUTION ORAL DAILY PRN
Status: ACTIVE | OUTPATIENT
Start: 2025-02-07

## 2025-02-07 RX ORDER — POTASSIUM CHLORIDE 20 MEQ/1
20 TABLET, EXTENDED RELEASE ORAL DAILY
Status: ACTIVE | OUTPATIENT
Start: 2025-02-07

## 2025-02-07 RX ORDER — ONDANSETRON HYDROCHLORIDE 2 MG/ML
4 INJECTION, SOLUTION INTRAVENOUS EVERY 8 HOURS PRN
Status: DISCONTINUED | OUTPATIENT
Start: 2025-02-07 | End: 2025-02-09

## 2025-02-07 RX ORDER — FLUTICASONE FUROATE AND VILANTEROL 200; 25 UG/1; UG/1
1 POWDER RESPIRATORY (INHALATION) DAILY
Status: DISCONTINUED | OUTPATIENT
Start: 2025-02-07 | End: 2025-02-07 | Stop reason: ALTCHOICE

## 2025-02-07 RX ORDER — ACETAMINOPHEN 160 MG/5ML
650 SOLUTION ORAL EVERY 6 HOURS PRN
Status: ACTIVE | OUTPATIENT
Start: 2025-02-07

## 2025-02-07 RX ORDER — OSELTAMIVIR PHOSPHATE 75 MG/1
75 CAPSULE ORAL ONCE
Status: COMPLETED | OUTPATIENT
Start: 2025-02-07 | End: 2025-02-07

## 2025-02-07 RX ORDER — SODIUM CHLORIDE 9 MG/ML
75 INJECTION, SOLUTION INTRAVENOUS CONTINUOUS
Status: ACTIVE | OUTPATIENT
Start: 2025-02-07 | End: 2025-02-08

## 2025-02-07 RX ORDER — GUAIFENESIN 600 MG/1
600 TABLET, EXTENDED RELEASE ORAL EVERY 12 HOURS PRN
Status: DISPENSED | OUTPATIENT
Start: 2025-02-07

## 2025-02-07 RX ORDER — PREDNISONE 20 MG/1
40 TABLET ORAL ONCE
Status: COMPLETED | OUTPATIENT
Start: 2025-02-07 | End: 2025-02-07

## 2025-02-07 RX ORDER — METOPROLOL SUCCINATE 50 MG/1
50 TABLET, EXTENDED RELEASE ORAL NIGHTLY
Status: DISPENSED | OUTPATIENT
Start: 2025-02-07

## 2025-02-07 RX ORDER — FORMOTEROL FUMARATE DIHYDRATE 20 UG/2ML
20 SOLUTION RESPIRATORY (INHALATION)
Status: DISPENSED | OUTPATIENT
Start: 2025-02-07

## 2025-02-07 RX ORDER — DICYCLOMINE HYDROCHLORIDE 10 MG/1
10 CAPSULE ORAL 3 TIMES DAILY PRN
Status: ACTIVE | OUTPATIENT
Start: 2025-02-07

## 2025-02-07 RX ORDER — AMLODIPINE BESYLATE 5 MG/1
5 TABLET ORAL DAILY
Status: DISPENSED | OUTPATIENT
Start: 2025-02-08

## 2025-02-07 RX ORDER — IPRATROPIUM BROMIDE AND ALBUTEROL SULFATE 2.5; .5 MG/3ML; MG/3ML
3 SOLUTION RESPIRATORY (INHALATION)
Status: COMPLETED | OUTPATIENT
Start: 2025-02-07 | End: 2025-02-07

## 2025-02-07 RX ORDER — POTASSIUM CHLORIDE 20 MEQ/1
20 TABLET, EXTENDED RELEASE ORAL DAILY
Status: ON HOLD | COMMUNITY

## 2025-02-07 RX ORDER — ONDANSETRON 4 MG/1
4 TABLET, FILM COATED ORAL EVERY 8 HOURS PRN
Status: ACTIVE | OUTPATIENT
Start: 2025-02-07

## 2025-02-07 RX ORDER — ACETAMINOPHEN 650 MG/1
650 SUPPOSITORY RECTAL EVERY 6 HOURS PRN
Status: ACTIVE | OUTPATIENT
Start: 2025-02-07

## 2025-02-07 RX ORDER — BUDESONIDE 0.5 MG/2ML
0.5 INHALANT ORAL
Status: DISPENSED | OUTPATIENT
Start: 2025-02-07

## 2025-02-07 RX ORDER — ENOXAPARIN SODIUM 100 MG/ML
30 INJECTION SUBCUTANEOUS EVERY 24 HOURS
Status: DISCONTINUED | OUTPATIENT
Start: 2025-02-07 | End: 2025-02-08

## 2025-02-07 RX ORDER — TIZANIDINE 2 MG/1
2 TABLET ORAL NIGHTLY
Status: ON HOLD | COMMUNITY

## 2025-02-07 RX ORDER — TIZANIDINE 2 MG/1
2 TABLET ORAL NIGHTLY
Status: DISPENSED | OUTPATIENT
Start: 2025-02-07

## 2025-02-07 RX ORDER — GABAPENTIN 100 MG/1
100 CAPSULE ORAL NIGHTLY
Status: DISPENSED | OUTPATIENT
Start: 2025-02-07

## 2025-02-07 RX ORDER — ALLOPURINOL 100 MG/1
100 TABLET ORAL DAILY
Status: DISPENSED | OUTPATIENT
Start: 2025-02-07

## 2025-02-07 RX ORDER — PANTOPRAZOLE SODIUM 40 MG/1
40 TABLET, DELAYED RELEASE ORAL DAILY
Status: DISPENSED | OUTPATIENT
Start: 2025-02-07

## 2025-02-07 RX ADMIN — PANTOPRAZOLE SODIUM 40 MG: 40 TABLET, DELAYED RELEASE ORAL at 14:38

## 2025-02-07 RX ADMIN — IPRATROPIUM BROMIDE AND ALBUTEROL SULFATE 3 ML: 2.5; .5 SOLUTION RESPIRATORY (INHALATION) at 10:11

## 2025-02-07 RX ADMIN — METOPROLOL SUCCINATE 50 MG: 50 TABLET, EXTENDED RELEASE ORAL at 21:11

## 2025-02-07 RX ADMIN — FORMOTEROL FUMARATE 20 MCG: 20 SOLUTION RESPIRATORY (INHALATION) at 20:53

## 2025-02-07 RX ADMIN — IPRATROPIUM BROMIDE AND ALBUTEROL SULFATE 3 ML: 2.5; .5 SOLUTION RESPIRATORY (INHALATION) at 10:13

## 2025-02-07 RX ADMIN — BUDESONIDE 0.5 MG: 0.5 INHALANT RESPIRATORY (INHALATION) at 20:53

## 2025-02-07 RX ADMIN — ROSUVASTATIN CALCIUM 5 MG: 5 TABLET, FILM COATED ORAL at 21:11

## 2025-02-07 RX ADMIN — SODIUM CHLORIDE, POTASSIUM CHLORIDE, SODIUM LACTATE AND CALCIUM CHLORIDE 1000 ML: 600; 310; 30; 20 INJECTION, SOLUTION INTRAVENOUS at 10:29

## 2025-02-07 RX ADMIN — TIZANIDINE 2 MG: 2 TABLET ORAL at 21:11

## 2025-02-07 RX ADMIN — OSELTAMAVIR PHOSPHATE 75 MG: 75 CAPSULE ORAL at 12:38

## 2025-02-07 RX ADMIN — IPRATROPIUM BROMIDE AND ALBUTEROL SULFATE 3 ML: 2.5; .5 SOLUTION RESPIRATORY (INHALATION) at 10:09

## 2025-02-07 RX ADMIN — PREDNISONE 40 MG: 20 TABLET ORAL at 10:03

## 2025-02-07 RX ADMIN — SODIUM CHLORIDE 75 ML/HR: 9 INJECTION, SOLUTION INTRAVENOUS at 14:36

## 2025-02-07 RX ADMIN — GABAPENTIN 100 MG: 100 CAPSULE ORAL at 21:11

## 2025-02-07 RX ADMIN — ENOXAPARIN SODIUM 30 MG: 30 INJECTION SUBCUTANEOUS at 14:39

## 2025-02-07 SDOH — SOCIAL STABILITY: SOCIAL INSECURITY: WITHIN THE LAST YEAR, HAVE YOU BEEN HUMILIATED OR EMOTIONALLY ABUSED IN OTHER WAYS BY YOUR PARTNER OR EX-PARTNER?: NO

## 2025-02-07 SDOH — ECONOMIC STABILITY: FOOD INSECURITY: WITHIN THE PAST 12 MONTHS, YOU WORRIED THAT YOUR FOOD WOULD RUN OUT BEFORE YOU GOT THE MONEY TO BUY MORE.: NEVER TRUE

## 2025-02-07 SDOH — SOCIAL STABILITY: SOCIAL INSECURITY: DOES ANYONE TRY TO KEEP YOU FROM HAVING/CONTACTING OTHER FRIENDS OR DOING THINGS OUTSIDE YOUR HOME?: NO

## 2025-02-07 SDOH — SOCIAL STABILITY: SOCIAL INSECURITY: ARE THERE ANY APPARENT SIGNS OF INJURIES/BEHAVIORS THAT COULD BE RELATED TO ABUSE/NEGLECT?: NO

## 2025-02-07 SDOH — SOCIAL STABILITY: SOCIAL INSECURITY: WITHIN THE LAST YEAR, HAVE YOU BEEN AFRAID OF YOUR PARTNER OR EX-PARTNER?: NO

## 2025-02-07 SDOH — ECONOMIC STABILITY: TRANSPORTATION INSECURITY: IN THE PAST 12 MONTHS, HAS LACK OF TRANSPORTATION KEPT YOU FROM MEDICAL APPOINTMENTS OR FROM GETTING MEDICATIONS?: NO

## 2025-02-07 SDOH — SOCIAL STABILITY: SOCIAL INSECURITY: HAVE YOU HAD ANY THOUGHTS OF HARMING ANYONE ELSE?: NO

## 2025-02-07 SDOH — SOCIAL STABILITY: SOCIAL INSECURITY
WITHIN THE LAST YEAR, HAVE YOU BEEN KICKED, HIT, SLAPPED, OR OTHERWISE PHYSICALLY HURT BY YOUR PARTNER OR EX-PARTNER?: NO

## 2025-02-07 SDOH — SOCIAL STABILITY: SOCIAL INSECURITY: DO YOU FEEL ANYONE HAS EXPLOITED OR TAKEN ADVANTAGE OF YOU FINANCIALLY OR OF YOUR PERSONAL PROPERTY?: NO

## 2025-02-07 SDOH — ECONOMIC STABILITY: HOUSING INSECURITY: IN THE LAST 12 MONTHS, WAS THERE A TIME WHEN YOU WERE NOT ABLE TO PAY THE MORTGAGE OR RENT ON TIME?: NO

## 2025-02-07 SDOH — SOCIAL STABILITY: SOCIAL INSECURITY: HAVE YOU HAD THOUGHTS OF HARMING ANYONE ELSE?: NO

## 2025-02-07 SDOH — ECONOMIC STABILITY: HOUSING INSECURITY: AT ANY TIME IN THE PAST 12 MONTHS, WERE YOU HOMELESS OR LIVING IN A SHELTER (INCLUDING NOW)?: NO

## 2025-02-07 SDOH — ECONOMIC STABILITY: FOOD INSECURITY: WITHIN THE PAST 12 MONTHS, THE FOOD YOU BOUGHT JUST DIDN'T LAST AND YOU DIDN'T HAVE MONEY TO GET MORE.: NEVER TRUE

## 2025-02-07 SDOH — SOCIAL STABILITY: SOCIAL INSECURITY: DO YOU FEEL UNSAFE GOING BACK TO THE PLACE WHERE YOU ARE LIVING?: NO

## 2025-02-07 SDOH — SOCIAL STABILITY: SOCIAL INSECURITY: WERE YOU ABLE TO COMPLETE ALL THE BEHAVIORAL HEALTH SCREENINGS?: YES

## 2025-02-07 SDOH — SOCIAL STABILITY: SOCIAL INSECURITY
WITHIN THE LAST YEAR, HAVE YOU BEEN RAPED OR FORCED TO HAVE ANY KIND OF SEXUAL ACTIVITY BY YOUR PARTNER OR EX-PARTNER?: NO

## 2025-02-07 SDOH — SOCIAL STABILITY: SOCIAL INSECURITY: ABUSE: ADULT

## 2025-02-07 SDOH — ECONOMIC STABILITY: INCOME INSECURITY: IN THE PAST 12 MONTHS HAS THE ELECTRIC, GAS, OIL, OR WATER COMPANY THREATENED TO SHUT OFF SERVICES IN YOUR HOME?: NO

## 2025-02-07 SDOH — SOCIAL STABILITY: SOCIAL INSECURITY: HAS ANYONE EVER THREATENED TO HURT YOUR FAMILY OR YOUR PETS?: NO

## 2025-02-07 SDOH — ECONOMIC STABILITY: FOOD INSECURITY: HOW HARD IS IT FOR YOU TO PAY FOR THE VERY BASICS LIKE FOOD, HOUSING, MEDICAL CARE, AND HEATING?: NOT HARD AT ALL

## 2025-02-07 SDOH — ECONOMIC STABILITY: HOUSING INSECURITY: IN THE PAST 12 MONTHS, HOW MANY TIMES HAVE YOU MOVED WHERE YOU WERE LIVING?: 1

## 2025-02-07 SDOH — SOCIAL STABILITY: SOCIAL INSECURITY: ARE YOU OR HAVE YOU BEEN THREATENED OR ABUSED PHYSICALLY, EMOTIONALLY, OR SEXUALLY BY ANYONE?: NO

## 2025-02-07 ASSESSMENT — COGNITIVE AND FUNCTIONAL STATUS - GENERAL
CLIMB 3 TO 5 STEPS WITH RAILING: A LITTLE
MOBILITY SCORE: 21
MOVING TO AND FROM BED TO CHAIR: A LITTLE
WALKING IN HOSPITAL ROOM: A LITTLE
DAILY ACTIVITIY SCORE: 24
PATIENT BASELINE BEDBOUND: NO

## 2025-02-07 ASSESSMENT — ACTIVITIES OF DAILY LIVING (ADL)
PATIENT'S MEMORY ADEQUATE TO SAFELY COMPLETE DAILY ACTIVITIES?: YES
HEARING - RIGHT EAR: FUNCTIONAL
HEARING - LEFT EAR: FUNCTIONAL
FEEDING YOURSELF: INDEPENDENT
LACK_OF_TRANSPORTATION: NO
ADEQUATE_TO_COMPLETE_ADL: YES
TOILETING: INDEPENDENT
ASSISTIVE_DEVICE: CANE;WALKER
WALKS IN HOME: INDEPENDENT
BATHING: INDEPENDENT
DRESSING YOURSELF: INDEPENDENT
LACK_OF_TRANSPORTATION: NO
GROOMING: INDEPENDENT
JUDGMENT_ADEQUATE_SAFELY_COMPLETE_DAILY_ACTIVITIES: YES

## 2025-02-07 ASSESSMENT — COLUMBIA-SUICIDE SEVERITY RATING SCALE - C-SSRS
2. HAVE YOU ACTUALLY HAD ANY THOUGHTS OF KILLING YOURSELF?: NO
1. IN THE PAST MONTH, HAVE YOU WISHED YOU WERE DEAD OR WISHED YOU COULD GO TO SLEEP AND NOT WAKE UP?: NO
6. HAVE YOU EVER DONE ANYTHING, STARTED TO DO ANYTHING, OR PREPARED TO DO ANYTHING TO END YOUR LIFE?: NO

## 2025-02-07 ASSESSMENT — LIFESTYLE VARIABLES
TOTAL SCORE: 0
EVER FELT BAD OR GUILTY ABOUT YOUR DRINKING: NO
HOW MANY STANDARD DRINKS CONTAINING ALCOHOL DO YOU HAVE ON A TYPICAL DAY: PATIENT DOES NOT DRINK
HOW OFTEN DO YOU HAVE A DRINK CONTAINING ALCOHOL: NEVER
HOW OFTEN DO YOU HAVE 6 OR MORE DRINKS ON ONE OCCASION: NEVER
HAVE PEOPLE ANNOYED YOU BY CRITICIZING YOUR DRINKING: NO
HAVE YOU EVER FELT YOU SHOULD CUT DOWN ON YOUR DRINKING: NO
AUDIT-C TOTAL SCORE: 0
AUDIT-C TOTAL SCORE: 0
SKIP TO QUESTIONS 9-10: 1
EVER HAD A DRINK FIRST THING IN THE MORNING TO STEADY YOUR NERVES TO GET RID OF A HANGOVER: NO

## 2025-02-07 ASSESSMENT — PATIENT HEALTH QUESTIONNAIRE - PHQ9
1. LITTLE INTEREST OR PLEASURE IN DOING THINGS: NOT AT ALL
SUM OF ALL RESPONSES TO PHQ9 QUESTIONS 1 & 2: 0
2. FEELING DOWN, DEPRESSED OR HOPELESS: NOT AT ALL

## 2025-02-07 ASSESSMENT — PAIN DESCRIPTION - DESCRIPTORS: DESCRIPTORS: ACHING;DULL

## 2025-02-07 ASSESSMENT — PAIN - FUNCTIONAL ASSESSMENT
PAIN_FUNCTIONAL_ASSESSMENT: 0-10
PAIN_FUNCTIONAL_ASSESSMENT: 0-10

## 2025-02-07 ASSESSMENT — PAIN SCALES - GENERAL
PAINLEVEL_OUTOF10: 2
PAINLEVEL_OUTOF10: 0 - NO PAIN

## 2025-02-07 NOTE — H&P
History Of Present Illness  Norma Villasenor is a 84 y.o. female presenting with generalized ill feeling.    Patient is an adequate historian, she was met on the 3S Unit; she was in her usual state of health until two days prior, she noted some episodes of watery diarrhea (since resolved), also endorsed having some shortness of breath associated with a dry nonproductive cough for several days.  She otherwise declined having any abdominal pain, nausea vomiting fevers chills or bodyaches.  She said that she was surprised that she was told that she had influenza A as when she had at the last time many years ago she had bodyaches and had a harder time breathing.    In the ED, vitals on arrival she was afebrile, HR 69, RR 18, /56, SpO2 94% on ambient air; high sensitivity troponin 19 -> 15, no leukocytosis, sCr above normal baseline at 1.63 (baseline 0.8-1.0), influenza A PCR+; she was initially treated with fluids, steroid, writer requested tamiflu to be started, she was brought in for observation for further workup and management.    12 point ROS completed and otherwise negative except as documented above in HPI.    Past Medical History  Past Medical History:   Diagnosis Date    GERD (gastroesophageal reflux disease)     Hypertension        Surgical History  Past Surgical History:   Procedure Laterality Date    CHOLECYSTECTOMY      COLONOSCOPY      CORONARY STENT PLACEMENT      OTHER SURGICAL HISTORY      UPPER GASTROINTESTINAL ENDOSCOPY          Social History  She reports that she has never smoked. She has been exposed to tobacco smoke. She has never used smokeless tobacco. She reports that she does not currently use alcohol. She reports that she does not use drugs.    Family History  No family history on file.     Allergies  Patient has no known allergies.     Physical Exam  Vitals reviewed.   Constitutional:       General: She is not in acute distress.     Appearance: Normal appearance. She is not ill-appearing.  "  HENT:      Head: Normocephalic and atraumatic.      Nose: Nose normal.      Mouth/Throat:      Mouth: Mucous membranes are moist.   Eyes:      General: No scleral icterus.  Cardiovascular:      Rate and Rhythm: Normal rate and regular rhythm.      Pulses: Normal pulses.      Heart sounds: Normal heart sounds. No murmur heard.     No friction rub. No gallop.   Pulmonary:      Effort: Pulmonary effort is normal. No respiratory distress.      Breath sounds: Rhonchi (diffuse rhonchi) present. No wheezing or rales.   Abdominal:      General: Abdomen is flat. Bowel sounds are normal. There is no distension.      Palpations: Abdomen is soft. There is no mass.      Tenderness: There is no abdominal tenderness. There is no guarding or rebound.   Musculoskeletal:         General: No swelling, tenderness or signs of injury.      Right lower leg: No edema.      Left lower leg: No edema.   Skin:     General: Skin is warm and dry.      Coloration: Skin is not jaundiced or pale.      Findings: No bruising, erythema, lesion or rash.   Neurological:      Mental Status: She is alert and oriented to person, place, and time. Mental status is at baseline.      Motor: No weakness.   Psychiatric:         Mood and Affect: Mood normal.         Behavior: Behavior normal.         Thought Content: Thought content normal.         Judgment: Judgment normal.       Last Recorded Vitals  Blood pressure 164/69, pulse 65, temperature 36.5 °C (97.7 °F), temperature source Temporal, resp. rate 16, height 1.499 m (4' 11\"), weight 53.5 kg (118 lb), SpO2 96%.    Relevant Results  Scheduled medications  [Held by provider] allopurinol, 100 mg, oral, Daily  [START ON 2/8/2025] amLODIPine, 5 mg, oral, Daily  budesonide, 0.5 mg, nebulization, BID  [START ON 2/8/2025] calcitriol, 0.25 mcg, oral, Daily  enoxaparin, 30 mg, subcutaneous, q24h  formoterol, 20 mcg, nebulization, BID  gabapentin, 100 mg, oral, Nightly  metoprolol succinate XL, 50 mg, oral, " Nightly  [START ON 2/8/2025] oseltamivir, 30 mg, oral, Daily  pantoprazole, 40 mg, oral, Daily  [Held by provider] potassium chloride CR, 20 mEq, oral, Daily  rosuvastatin, 5 mg, oral, Nightly  tiZANidine, 2 mg, oral, Nightly      Continuous medications  sodium chloride 0.9%, 75 mL/hr, Last Rate: 75 mL/hr (02/07/25 1436)      PRN medications  PRN medications: acetaminophen **OR** acetaminophen **OR** acetaminophen, benzocaine-menthol, dicyclomine, guaiFENesin, ondansetron **OR** ondansetron, polyethylene glycol    Results for orders placed or performed during the hospital encounter of 02/07/25 (from the past 24 hours)   ECG 12 lead   Result Value Ref Range    Ventricular Rate 67 BPM    Atrial Rate 67 BPM    MA Interval 132 ms    QRS Duration 88 ms    QT Interval 410 ms    QTC Calculation(Bazett) 433 ms    P Axis 62 degrees    R Axis 54 degrees    T Axis 25 degrees    QRS Count 11 beats    Q Onset 221 ms    P Onset 155 ms    P Offset 209 ms    T Offset 426 ms    QTC Fredericia 425 ms   CBC with Differential   Result Value Ref Range    WBC 4.5 4.4 - 11.3 x10*3/uL    nRBC 0.0 0.0 - 0.0 /100 WBCs    RBC 4.55 4.00 - 5.20 x10*6/uL    Hemoglobin 13.4 12.0 - 16.0 g/dL    Hematocrit 41.1 36.0 - 46.0 %    MCV 90 80 - 100 fL    MCH 29.5 26.0 - 34.0 pg    MCHC 32.6 32.0 - 36.0 g/dL    RDW 12.4 11.5 - 14.5 %    Platelets 226 150 - 450 x10*3/uL    Neutrophils % 53.4 40.0 - 80.0 %    Immature Granulocytes %, Automated 0.4 0.0 - 0.9 %    Lymphocytes % 31.0 13.0 - 44.0 %    Monocytes % 14.8 2.0 - 10.0 %    Eosinophils % 0.2 0.0 - 6.0 %    Basophils % 0.2 0.0 - 2.0 %    Neutrophils Absolute 2.41 1.60 - 5.50 x10*3/uL    Immature Granulocytes Absolute, Automated 0.02 0.00 - 0.50 x10*3/uL    Lymphocytes Absolute 1.40 0.80 - 3.00 x10*3/uL    Monocytes Absolute 0.67 0.05 - 0.80 x10*3/uL    Eosinophils Absolute 0.01 0.00 - 0.40 x10*3/uL    Basophils Absolute 0.01 0.00 - 0.10 x10*3/uL   Comprehensive Metabolic Panel   Result Value Ref Range     Glucose 108 (H) 74 - 99 mg/dL    Sodium 138 136 - 145 mmol/L    Potassium 3.7 3.5 - 5.3 mmol/L    Chloride 103 98 - 107 mmol/L    Bicarbonate 24 21 - 32 mmol/L    Anion Gap 15 10 - 20 mmol/L    Urea Nitrogen 52 (H) 6 - 23 mg/dL    Creatinine 1.63 (H) 0.50 - 1.05 mg/dL    eGFR 31 (L) >60 mL/min/1.73m*2    Calcium 10.0 8.6 - 10.3 mg/dL    Albumin 4.2 3.4 - 5.0 g/dL    Alkaline Phosphatase 51 33 - 136 U/L    Total Protein 7.9 6.4 - 8.2 g/dL    AST 20 9 - 39 U/L    Bilirubin, Total 0.3 0.0 - 1.2 mg/dL    ALT 16 7 - 45 U/L   Brain Natriuretic Peptide   Result Value Ref Range    BNP 89 0 - 99 pg/mL   Troponin I, High Sensitivity, Initial   Result Value Ref Range    Troponin I, High Sensitivity 19 (H) 0 - 13 ng/L   Sars-CoV-2 and Influenza A/B PCR   Result Value Ref Range    Flu A Result Detected (A) Not Detected    Flu B Result Not Detected Not Detected    Coronavirus 2019, PCR Not Detected Not Detected   RSV PCR   Result Value Ref Range    RSV PCR Not Detected Not Detected   Troponin, High Sensitivity, 1 Hour   Result Value Ref Range    Troponin I, High Sensitivity 15 (H) 0 - 13 ng/L     ECG 12 lead    Result Date: 2/7/2025  Sinus rhythm with marked sinus arrhythmia ST abnormality, possible digitalis effect When compared with ECG of 25-SEP-2023 09:50, Premature atrial complexes are no longer Present Reconfirmed by Prabhjot Trejo ([id number]) on 2/7/2025 5:10:57 PM    XR chest 1 view    Result Date: 2/7/2025  STUDY: Chest Radiograph;  2/7/2025 9:51 AM INDICATION: Shortness of breath.  Chest pain. COMPARISON: None Available. ACCESSION NUMBER(S): [id number] ORDERING CLINICIAN: KENZIE ALFORD TECHNIQUE:  Frontal chest was obtained at 09:50 hours. FINDINGS: CARDIOMEDIASTINAL SILHOUETTE: Cardiomediastinal silhouette is normal in size and configuration.  LUNGS: Lungs are clear.  Emphysematous changes noted.  ABDOMEN: No remarkable upper abdominal findings.  BONES: No acute osseous changes.    No acute process. Signed by  Manish Verduzco MD          Assessment/Plan   Is a very pleasant 84-year-old lady with a known history of GERD hypertension and gout who presents with several days of generalized ill feeling found to have influenza a positive PCR, course complicated by mild ULI, she is feeling significantly improved after fluid resuscitation, will we will trend her BMP later this evening and continue oseltamivir, anticipate home with no needs as soon as tomorrow.  Assessment & Plan  Influenza A  -Observation admission  -Continue Tamiflu  -Gentle fluid resuscitation  -Trend BMP later for her creatinine  -PT OT has been ordered  ULI (acute kidney injury) (CMS-[facility])  -As above  -Home medication reconciliation was reviewed in this context, anticipate ULI to have resolved but this time it is rechecked this evening    Diet: Regular  VTE PPx: Enoxaparin  Code: Full    I spent 35 minutes in the professional and overall care of this patient.    Tan Mackenzie MD

## 2025-02-07 NOTE — ASSESSMENT & PLAN NOTE
-As above  -Home medication reconciliation was reviewed in this context, anticipate ULI to have resolved but this time it is rechecked this evening

## 2025-02-07 NOTE — ED PROVIDER NOTES
HPI   Chief Complaint   Patient presents with    Chest Pain       HPI  Norma Villasenor is an 84-year-old female with history of CAD s/p stent, asthma, GERD, hypertension, hyperlipidemia, mitral valve prolapse presenting to the ED with chest pain.  Patient states the chest pain began about 1.5 hours ago.  Patient states she also began having shortness of breath around 2 AM.  Patient states she used her inhaler which did improve the chest pain and shortness of breath.  Patient states she still having chest pain and shortness of breath currently however.  Patient states she has been having a productive cough for about 1 week.  Patient's son is at bedside and patient's son states the patient is also been having diarrhea.  Patient denies abdominal pain, nausea, vomiting, fevers, body aches, chills.  No history of DVT and PE      Patient History   Past Medical History:   Diagnosis Date    GERD (gastroesophageal reflux disease)     Hypertension      Past Surgical History:   Procedure Laterality Date    CHOLECYSTECTOMY      COLONOSCOPY      CORONARY STENT PLACEMENT      OTHER SURGICAL HISTORY      UPPER GASTROINTESTINAL ENDOSCOPY       No family history on file.  Social History     Tobacco Use    Smoking status: Never     Passive exposure: Past    Smokeless tobacco: Never   Vaping Use    Vaping status: Not on file   Substance Use Topics    Alcohol use: Not Currently    Drug use: Never       Physical Exam   ED Triage Vitals   Temperature Heart Rate Respirations BP   02/07/25 0907 02/07/25 0910 02/07/25 0910 02/07/25 0910   36.9 °C (98.4 °F) 69 18 143/56      Pulse Ox Temp src Heart Rate Source Patient Position   02/07/25 0910 -- -- --   94 %         BP Location FiO2 (%)     -- --             Physical Exam  Vitals and nursing note reviewed.   Constitutional:       Appearance: Normal appearance.   Cardiovascular:      Rate and Rhythm: Normal rate and regular rhythm.      Heart sounds: Normal heart sounds. No murmur heard.     No  gallop.   Pulmonary:      Effort: Pulmonary effort is normal. No respiratory distress.      Breath sounds: No stridor. Wheezing (Bilateral) and rhonchi (Bilateral) present. No rales.   Chest:      Chest wall: No tenderness.   Abdominal:      General: Abdomen is flat. Bowel sounds are normal. There is no distension.      Palpations: Abdomen is soft. There is no mass.      Tenderness: There is no abdominal tenderness. There is no guarding or rebound.      Hernia: No hernia is present.   Musculoskeletal:      Right lower leg: No edema.      Left lower leg: No edema.   Skin:     General: Skin is warm and dry.   Neurological:      General: No focal deficit present.      Mental Status: She is alert and oriented to person, place, and time.   Psychiatric:         Mood and Affect: Mood normal.         Behavior: Behavior normal.           ED Course & MDM   ED Course as of 02/07/25 1243   Fri Feb 07, 2025   0919 Electrocardiogram 12 Lead  ECG, per my read, with sinus arrythmia, heart rate 67 bpm, normal axis and intervals, no T wave inversions, no ST segment abnormalities. No change from prior ECG in 2023. [MB]      ED Course User Index  [MB] Janet Costa MD         Diagnoses as of 02/07/25 1243   Chest pain, unspecified type   ULI (acute kidney injury) (CMS-HCC)                 No data recorded     Gravity Coma Scale Score: 15 (02/07/25 0925 : Domitila Mcmanus RN)                           Medical Decision Making    This is an 84-year-old female presenting to the ED with chest pain that began 1.5 hours ago and shortness of breath that began around 2 AM.  On exam patient has bilateral wheezing and rhonchi.  Chest x-ray obtained for further evaluation of shortness of breath and chest pain with concern for pneumonia.  X-ray shows no acute cardiopulmonary pathology.  X-ray was individually reviewed today and I agree with radiology's interpretation.  Patient was given DuoNeb treatments and prednisone for treatment of  asthma/COPD exacerbation.  EKG and troponins were obtained to rule out ACS.  Initial troponin was elevated at 19 and repeat troponin was also elevated however decreased to 15.  BNP obtained to rule out heart failure exacerbation and BNP is within normal limits.  COVID, influenza, RSV swabs were obtained.  COVID, RSV swabs are negative.  Influenza swab is positive for influenza A.  Patient was given dose of Tamiflu.  CBC and CMP also obtained today.  CMP shows ULI with BUN to 52, creatinine 1.63, GFR 31.  Patient was given 1 L LR bolus for ULI.  Heart score 5.  Patient has remained hemodynamically stable in the emergency department today.  Patient will need to be admitted for further management of ULI as well as cardiac restratification.    Disposition: Hospital admission  Patient is admitted to medicine under Dr. Mackenzie.  Patient agrees to hospital admission at this time.    Patient was discussed and staffed with Dr. Costa    Procedure  Procedures     Rishabh Logan PA-C  02/07/25 1530

## 2025-02-07 NOTE — CARE PLAN
The patient's goals for the shift include      The clinical goals for the shift include remain HDS, free of injury throughout shift      Problem: Pain - Adult  Goal: Verbalizes/displays adequate comfort level or baseline comfort level  Outcome: Progressing     Problem: Safety - Adult  Goal: Free from fall injury  Outcome: Progressing     Problem: Discharge Planning  Goal: Discharge to home or other facility with appropriate resources  Outcome: Progressing     Problem: Chronic Conditions and Co-morbidities  Goal: Patient's chronic conditions and co-morbidity symptoms are monitored and maintained or improved  Outcome: Progressing     Problem: Nutrition  Goal: Nutrient intake appropriate for maintaining nutritional needs  Outcome: Progressing     Problem: Respiratory  Goal: Clear secretions with interventions this shift  Outcome: Progressing  Goal: Minimize anxiety/maximize coping throughout shift  Outcome: Progressing  Goal: Minimal/no exertional discomfort or dyspnea this shift  Outcome: Progressing  Goal: No signs of respiratory distress (eg. Use of accessory muscles. Peds grunting)  Outcome: Progressing  Goal: Verbalize decreased shortness of breath this shift  Outcome: Progressing  Goal: Increase self care and/or family involvement in next 24 hours  Outcome: Progressing

## 2025-02-07 NOTE — ASSESSMENT & PLAN NOTE
-Observation admission  -Continue Tamiflu  -Gentle fluid resuscitation  -Trend BMP later for her creatinine  -PT OT has been ordered

## 2025-02-08 ENCOUNTER — APPOINTMENT (OUTPATIENT)
Dept: CARDIOLOGY | Facility: HOSPITAL | Age: 85
DRG: 193 | End: 2025-02-08
Payer: MEDICARE

## 2025-02-08 PROBLEM — I48.0 PAROXYSMAL ATRIAL FIBRILLATION (MULTI): Status: ACTIVE | Noted: 2025-02-08

## 2025-02-08 PROBLEM — R07.9 CHEST PAIN, UNSPECIFIED TYPE: Status: ACTIVE | Noted: 2025-02-08

## 2025-02-08 LAB
ANION GAP SERPL CALC-SCNC: 12 MMOL/L (ref 10–20)
ATRIAL RATE: 174 BPM
ATRIAL RATE: 67 BPM
BUN SERPL-MCNC: 31 MG/DL (ref 6–23)
CALCIUM SERPL-MCNC: 8.7 MG/DL (ref 8.6–10.3)
CHLORIDE SERPL-SCNC: 109 MMOL/L (ref 98–107)
CO2 SERPL-SCNC: 22 MMOL/L (ref 21–32)
CREAT SERPL-MCNC: 0.9 MG/DL (ref 0.5–1.05)
EGFRCR SERPLBLD CKD-EPI 2021: 64 ML/MIN/1.73M*2
ERYTHROCYTE [DISTWIDTH] IN BLOOD BY AUTOMATED COUNT: 12.4 % (ref 11.5–14.5)
GLUCOSE SERPL-MCNC: 145 MG/DL (ref 74–99)
HCT VFR BLD AUTO: 34.2 % (ref 36–46)
HGB BLD-MCNC: 11 G/DL (ref 12–16)
MAGNESIUM SERPL-MCNC: 1.73 MG/DL (ref 1.6–2.4)
MCH RBC QN AUTO: 29.1 PG (ref 26–34)
MCHC RBC AUTO-ENTMCNC: 32.2 G/DL (ref 32–36)
MCV RBC AUTO: 91 FL (ref 80–100)
NRBC BLD-RTO: 0 /100 WBCS (ref 0–0)
P AXIS: 62 DEGREES
P OFFSET: 209 MS
P ONSET: 155 MS
PLATELET # BLD AUTO: 202 X10*3/UL (ref 150–450)
POTASSIUM SERPL-SCNC: 3.4 MMOL/L (ref 3.5–5.3)
PR INTERVAL: 132 MS
Q ONSET: 221 MS
Q ONSET: 221 MS
QRS COUNT: 11 BEATS
QRS COUNT: 25 BEATS
QRS DURATION: 84 MS
QRS DURATION: 88 MS
QT INTERVAL: 304 MS
QT INTERVAL: 410 MS
QTC CALCULATION(BAZETT): 433 MS
QTC CALCULATION(BAZETT): 489 MS
QTC FREDERICIA: 418 MS
QTC FREDERICIA: 425 MS
R AXIS: 48 DEGREES
R AXIS: 54 DEGREES
RBC # BLD AUTO: 3.78 X10*6/UL (ref 4–5.2)
SODIUM SERPL-SCNC: 140 MMOL/L (ref 136–145)
T AXIS: 236 DEGREES
T AXIS: 25 DEGREES
T OFFSET: 373 MS
T OFFSET: 426 MS
T4 FREE SERPL-MCNC: 1.14 NG/DL (ref 0.61–1.12)
TSH SERPL-ACNC: 0.24 MIU/L (ref 0.44–3.98)
VENTRICULAR RATE: 156 BPM
VENTRICULAR RATE: 67 BPM
WBC # BLD AUTO: 3.5 X10*3/UL (ref 4.4–11.3)

## 2025-02-08 PROCEDURE — 84439 ASSAY OF FREE THYROXINE: CPT | Performed by: INTERNAL MEDICINE

## 2025-02-08 PROCEDURE — 99233 SBSQ HOSP IP/OBS HIGH 50: CPT | Performed by: INTERNAL MEDICINE

## 2025-02-08 PROCEDURE — 2500000001 HC RX 250 WO HCPCS SELF ADMINISTERED DRUGS (ALT 637 FOR MEDICARE OP): Performed by: INTERNAL MEDICINE

## 2025-02-08 PROCEDURE — 2500000002 HC RX 250 W HCPCS SELF ADMINISTERED DRUGS (ALT 637 FOR MEDICARE OP, ALT 636 FOR OP/ED)

## 2025-02-08 PROCEDURE — 2500000004 HC RX 250 GENERAL PHARMACY W/ HCPCS (ALT 636 FOR OP/ED)

## 2025-02-08 PROCEDURE — 80048 BASIC METABOLIC PNL TOTAL CA: CPT | Performed by: INTERNAL MEDICINE

## 2025-02-08 PROCEDURE — 93005 ELECTROCARDIOGRAM TRACING: CPT

## 2025-02-08 PROCEDURE — 36415 COLL VENOUS BLD VENIPUNCTURE: CPT | Performed by: INTERNAL MEDICINE

## 2025-02-08 PROCEDURE — 1200000002 HC GENERAL ROOM WITH TELEMETRY DAILY

## 2025-02-08 PROCEDURE — 94640 AIRWAY INHALATION TREATMENT: CPT

## 2025-02-08 PROCEDURE — 83735 ASSAY OF MAGNESIUM: CPT | Performed by: INTERNAL MEDICINE

## 2025-02-08 PROCEDURE — 2500000004 HC RX 250 GENERAL PHARMACY W/ HCPCS (ALT 636 FOR OP/ED): Performed by: INTERNAL MEDICINE

## 2025-02-08 PROCEDURE — 2500000002 HC RX 250 W HCPCS SELF ADMINISTERED DRUGS (ALT 637 FOR MEDICARE OP, ALT 636 FOR OP/ED): Performed by: INTERNAL MEDICINE

## 2025-02-08 PROCEDURE — 2500000005 HC RX 250 GENERAL PHARMACY W/O HCPCS

## 2025-02-08 PROCEDURE — 93010 ELECTROCARDIOGRAM REPORT: CPT | Performed by: INTERNAL MEDICINE

## 2025-02-08 PROCEDURE — 82374 ASSAY BLOOD CARBON DIOXIDE: CPT | Performed by: INTERNAL MEDICINE

## 2025-02-08 PROCEDURE — 85027 COMPLETE CBC AUTOMATED: CPT | Performed by: INTERNAL MEDICINE

## 2025-02-08 PROCEDURE — 99223 1ST HOSP IP/OBS HIGH 75: CPT | Performed by: INTERNAL MEDICINE

## 2025-02-08 PROCEDURE — 84443 ASSAY THYROID STIM HORMONE: CPT | Performed by: INTERNAL MEDICINE

## 2025-02-08 RX ORDER — POTASSIUM CHLORIDE 750 MG/1
10 TABLET, FILM COATED, EXTENDED RELEASE ORAL ONCE
Status: COMPLETED | OUTPATIENT
Start: 2025-02-08 | End: 2025-02-08

## 2025-02-08 RX ORDER — METOPROLOL TARTRATE 1 MG/ML
5 INJECTION, SOLUTION INTRAVENOUS ONCE
Status: COMPLETED | OUTPATIENT
Start: 2025-02-08 | End: 2025-02-08

## 2025-02-08 RX ORDER — METOPROLOL TARTRATE 1 MG/ML
INJECTION, SOLUTION INTRAVENOUS
Status: COMPLETED
Start: 2025-02-08 | End: 2025-02-08

## 2025-02-08 RX ORDER — IPRATROPIUM BROMIDE AND ALBUTEROL SULFATE 2.5; .5 MG/3ML; MG/3ML
3 SOLUTION RESPIRATORY (INHALATION) 3 TIMES DAILY
Status: DISPENSED | OUTPATIENT
Start: 2025-02-08

## 2025-02-08 RX ORDER — IPRATROPIUM BROMIDE AND ALBUTEROL SULFATE 2.5; .5 MG/3ML; MG/3ML
3 SOLUTION RESPIRATORY (INHALATION) EVERY 2 HOUR PRN
Status: ACTIVE | OUTPATIENT
Start: 2025-02-08

## 2025-02-08 RX ORDER — MAGNESIUM SULFATE HEPTAHYDRATE 40 MG/ML
2 INJECTION, SOLUTION INTRAVENOUS ONCE
Status: COMPLETED | OUTPATIENT
Start: 2025-02-08 | End: 2025-02-08

## 2025-02-08 RX ORDER — ASPIRIN 81 MG/1
81 TABLET ORAL DAILY
Status: DISPENSED | OUTPATIENT
Start: 2025-02-08

## 2025-02-08 RX ADMIN — FORMOTEROL FUMARATE 20 MCG: 20 SOLUTION RESPIRATORY (INHALATION) at 20:40

## 2025-02-08 RX ADMIN — TIZANIDINE 2 MG: 2 TABLET ORAL at 20:21

## 2025-02-08 RX ADMIN — OSELTAMAVIR PHOSPHATE 30 MG: 30 CAPSULE ORAL at 09:30

## 2025-02-08 RX ADMIN — APIXABAN 2.5 MG: 2.5 TABLET, FILM COATED ORAL at 23:42

## 2025-02-08 RX ADMIN — MAGNESIUM SULFATE HEPTAHYDRATE 2 G: 40 INJECTION, SOLUTION INTRAVENOUS at 12:04

## 2025-02-08 RX ADMIN — IPRATROPIUM BROMIDE AND ALBUTEROL SULFATE 3 ML: 2.5; .5 SOLUTION RESPIRATORY (INHALATION) at 15:32

## 2025-02-08 RX ADMIN — ASPIRIN 81 MG: 81 TABLET, COATED ORAL at 12:03

## 2025-02-08 RX ADMIN — PANTOPRAZOLE SODIUM 40 MG: 40 TABLET, DELAYED RELEASE ORAL at 09:30

## 2025-02-08 RX ADMIN — POTASSIUM CHLORIDE 10 MEQ: 750 TABLET, EXTENDED RELEASE ORAL at 09:29

## 2025-02-08 RX ADMIN — FORMOTEROL FUMARATE 20 MCG: 20 SOLUTION RESPIRATORY (INHALATION) at 08:19

## 2025-02-08 RX ADMIN — APIXABAN 2.5 MG: 2.5 TABLET, FILM COATED ORAL at 12:02

## 2025-02-08 RX ADMIN — IPRATROPIUM BROMIDE AND ALBUTEROL SULFATE 3 ML: 2.5; .5 SOLUTION RESPIRATORY (INHALATION) at 20:41

## 2025-02-08 RX ADMIN — METOPROLOL SUCCINATE 50 MG: 50 TABLET, EXTENDED RELEASE ORAL at 20:21

## 2025-02-08 RX ADMIN — METOPROLOL TARTRATE 5 MG: 5 INJECTION INTRAVENOUS at 10:50

## 2025-02-08 RX ADMIN — GABAPENTIN 100 MG: 100 CAPSULE ORAL at 20:21

## 2025-02-08 RX ADMIN — GUAIFENESIN 600 MG: 600 TABLET ORAL at 20:30

## 2025-02-08 RX ADMIN — BUDESONIDE 0.5 MG: 0.5 INHALANT RESPIRATORY (INHALATION) at 08:19

## 2025-02-08 RX ADMIN — METOPROLOL TARTRATE 5 MG: 5 INJECTION INTRAVENOUS at 21:46

## 2025-02-08 RX ADMIN — BUDESONIDE 0.5 MG: 0.5 INHALANT RESPIRATORY (INHALATION) at 20:40

## 2025-02-08 RX ADMIN — ROSUVASTATIN CALCIUM 5 MG: 5 TABLET, FILM COATED ORAL at 20:21

## 2025-02-08 RX ADMIN — METOPROLOL TARTRATE 5 MG: 5 INJECTION INTRAVENOUS at 09:20

## 2025-02-08 RX ADMIN — METOPROLOL TARTRATE 5 MG: 1 INJECTION, SOLUTION INTRAVENOUS at 10:50

## 2025-02-08 RX ADMIN — Medication 2 L/MIN: at 22:00

## 2025-02-08 RX ADMIN — AMLODIPINE BESYLATE 5 MG: 5 TABLET ORAL at 09:30

## 2025-02-08 RX ADMIN — IPRATROPIUM BROMIDE AND ALBUTEROL SULFATE 3 ML: 2.5; .5 SOLUTION RESPIRATORY (INHALATION) at 08:35

## 2025-02-08 RX ADMIN — CALCITRIOL CAPSULES 0.25 MCG 0.25 MCG: 0.25 CAPSULE ORAL at 09:30

## 2025-02-08 ASSESSMENT — COGNITIVE AND FUNCTIONAL STATUS - GENERAL
WALKING IN HOSPITAL ROOM: A LITTLE
MOBILITY SCORE: 21
DAILY ACTIVITIY SCORE: 24
CLIMB 3 TO 5 STEPS WITH RAILING: A LITTLE
MOVING TO AND FROM BED TO CHAIR: A LITTLE

## 2025-02-08 ASSESSMENT — PAIN - FUNCTIONAL ASSESSMENT
PAIN_FUNCTIONAL_ASSESSMENT: 0-10
PAIN_FUNCTIONAL_ASSESSMENT: 0-10

## 2025-02-08 ASSESSMENT — PAIN SCALES - GENERAL
PAINLEVEL_OUTOF10: 0 - NO PAIN
PAINLEVEL_OUTOF10: 0 - NO PAIN

## 2025-02-08 NOTE — ASSESSMENT & PLAN NOTE
-Upgraded to inpatient, continue with telemetry  -I certify that greater than 2 midnights will be required in the hospital to facilitate this patient's diagnosis and treatment of the presenting problems as documented above.  -Continue Tamiflu  -PT OT appreciated

## 2025-02-08 NOTE — ASSESSMENT & PLAN NOTE
-Onset of atrial fibrillation after 7 AM  -Amiodarone bolus and drip  -If she does not convert the next several hours, she is being upgraded to inpatient and may be a good candidate to transfer down to the stepdown unit  -Continue metoprolol at current dosing

## 2025-02-08 NOTE — CARE PLAN
The patient's goals for the shift include     Problem: Pain - Adult  Goal: Verbalizes/displays adequate comfort level or baseline comfort level  Outcome: Progressing     Problem: Safety - Adult  Goal: Free from fall injury  Outcome: Progressing     Problem: Discharge Planning  Goal: Discharge to home or other facility with appropriate resources  Outcome: Progressing     Problem: Chronic Conditions and Co-morbidities  Goal: Patient's chronic conditions and co-morbidity symptoms are monitored and maintained or improved  Outcome: Progressing     Problem: Nutrition  Goal: Nutrient intake appropriate for maintaining nutritional needs  Outcome: Progressing     Problem: Respiratory  Goal: Clear secretions with interventions this shift  Outcome: Progressing  Goal: Minimize anxiety/maximize coping throughout shift  Outcome: Progressing  Goal: Minimal/no exertional discomfort or dyspnea this shift  Outcome: Progressing  Goal: No signs of respiratory distress (eg. Use of accessory muscles. Peds grunting)  Outcome: Progressing  Goal: Verbalize decreased shortness of breath this shift  Outcome: Progressing  Goal: Increase self care and/or family involvement in next 24 hours  Outcome: Progressing      The clinical goals for the shift include labs/vital signs stable, heart rate/rhythm monitored, and patient safety maintained    Over the shift, the patient did make progress toward the following goals. Patient's labs and vital signs stable. Patient's EKG showed A.fib RVR this AM. Patient given IV metoprolol, magnesium, eliquis, and aspirin. Patient's heart rate and rhythm monitored during the shift. Patient safety maintained.

## 2025-02-08 NOTE — PROGRESS NOTES
Norma Villasenor is a 84 y.o. female on day 0 of admission presenting with Influenza A.    Subjective   No acute events overnight, although at approximately 7:04 AM, she converted into atrial fibrillation.  Her son came to visit her shortly after writer came in for the interview and examination, the patient said that she had a slight nervous feeling in her chest although denied any pressure pain, no shortness of breath.  She feels like her respiratory effort is improved, she has otherwise no major complaint was tolerating eating her breakfast at the time of the interview and examination today.  We had a long discussion and she noted that she lived in the [facility] from birth until when her last son was born, they said that she might have an enlarged heart and history of mitral valve prolapse, although it was unclear, and did not appear on a prior workup on her record review through the [facility] that there was evidence of MVP dating back to 2018; we discussed atrial fibrillation, stroke risk reduction techniques, rate control versus rhythm control; then no further questions, we collectively determined that getting cardiology involved would help to make more definitive choices although discussed that amiodarone was a likely possibility.    Objective     Physical Exam  Vitals reviewed.   Constitutional:       General: She is not in acute distress.     Appearance: Normal appearance. She is not ill-appearing.   HENT:      Head: Normocephalic and atraumatic.      Nose: Nose normal.      Mouth/Throat:      Mouth: Mucous membranes are moist.   Eyes:      Extraocular Movements: Extraocular movements intact.      Pupils: Pupils are equal, round, and reactive to light.   Cardiovascular:      Rate and Rhythm: Tachycardia present. Rhythm irregular.      Pulses: Normal pulses.      Heart sounds: Normal heart sounds. No murmur heard.     No friction rub. No gallop.   Pulmonary:      Effort: Pulmonary effort is  "normal. No respiratory distress.      Breath sounds: Rhonchi (Scattered and diffuse) present. No wheezing or rales.   Abdominal:      General: Abdomen is flat. Bowel sounds are normal. There is no distension.      Palpations: Abdomen is soft. There is no mass.      Tenderness: There is no abdominal tenderness. There is no guarding or rebound.   Musculoskeletal:         General: No swelling, tenderness or signs of injury.      Right lower leg: No edema.      Left lower leg: No edema.   Skin:     General: Skin is warm and dry.      Coloration: Skin is not jaundiced or pale.      Findings: No bruising, erythema, lesion or rash.   Neurological:      General: No focal deficit present.      Mental Status: She is alert and oriented to person, place, and time. Mental status is at baseline.      Cranial Nerves: No cranial nerve deficit.      Motor: No weakness.   Psychiatric:         Mood and Affect: Mood normal.         Behavior: Behavior normal.         Thought Content: Thought content normal.         Judgment: Judgment normal.         Last Recorded Vitals  Blood pressure 112/57, pulse 80, temperature 36 °C (96.8 °F), temperature source Temporal, resp. rate 18, height 1.499 m (4' 11\"), weight 53.5 kg (118 lb), SpO2 95%.  Intake/Output last 3 Shifts:  I/O last 3 completed shifts:  In: 2120 (39.6 mL/kg) [P.O.:120; I.V.:1000 (18.7 mL/kg); IV Piggyback:1000]  Out: - (0 mL/kg)   Weight: 53.5 kg     Relevant Results  Scheduled medications  [Held by provider] allopurinol, 100 mg, oral, Daily  amLODIPine, 5 mg, oral, Daily  apixaban, 2.5 mg, oral, q12h  aspirin, 81 mg, oral, Daily  budesonide, 0.5 mg, nebulization, BID  calcitriol, 0.25 mcg, oral, Daily  formoterol, 20 mcg, nebulization, BID  gabapentin, 100 mg, oral, Nightly  ipratropium-albuteroL, 3 mL, nebulization, TID  metoprolol succinate XL, 50 mg, oral, Nightly  oseltamivir, 30 mg, oral, Daily  pantoprazole, 40 mg, oral, Daily  [Held by provider] potassium chloride CR, 20 " mEq, oral, Daily  rosuvastatin, 5 mg, oral, Nightly  tiZANidine, 2 mg, oral, Nightly      Continuous medications     PRN medications  PRN medications: acetaminophen **OR** acetaminophen **OR** acetaminophen, benzocaine-menthol, dicyclomine, guaiFENesin, ipratropium-albuteroL, ondansetron **OR** ondansetron, polyethylene glycol    Results for orders placed or performed during the hospital encounter of 02/07/25 (from the past 24 hours)   Basic Metabolic Panel   Result Value Ref Range    Glucose 194 (H) 74 - 99 mg/dL    Sodium 137 136 - 145 mmol/L    Potassium 3.6 3.5 - 5.3 mmol/L    Chloride 104 98 - 107 mmol/L    Bicarbonate 23 21 - 32 mmol/L    Anion Gap 14 10 - 20 mmol/L    Urea Nitrogen 40 (H) 6 - 23 mg/dL    Creatinine 1.08 (H) 0.50 - 1.05 mg/dL    eGFR 51 (L) >60 mL/min/1.73m*2    Calcium 9.3 8.6 - 10.3 mg/dL   CBC   Result Value Ref Range    WBC 3.5 (L) 4.4 - 11.3 x10*3/uL    nRBC 0.0 0.0 - 0.0 /100 WBCs    RBC 3.78 (L) 4.00 - 5.20 x10*6/uL    Hemoglobin 11.0 (L) 12.0 - 16.0 g/dL    Hematocrit 34.2 (L) 36.0 - 46.0 %    MCV 91 80 - 100 fL    MCH 29.1 26.0 - 34.0 pg    MCHC 32.2 32.0 - 36.0 g/dL    RDW 12.4 11.5 - 14.5 %    Platelets 202 150 - 450 x10*3/uL   Basic metabolic panel   Result Value Ref Range    Glucose 145 (H) 74 - 99 mg/dL    Sodium 140 136 - 145 mmol/L    Potassium 3.4 (L) 3.5 - 5.3 mmol/L    Chloride 109 (H) 98 - 107 mmol/L    Bicarbonate 22 21 - 32 mmol/L    Anion Gap 12 10 - 20 mmol/L    Urea Nitrogen 31 (H) 6 - 23 mg/dL    Creatinine 0.90 0.50 - 1.05 mg/dL    eGFR 64 >60 mL/min/1.73m*2    Calcium 8.7 8.6 - 10.3 mg/dL   Magnesium   Result Value Ref Range    Magnesium 1.73 1.60 - 2.40 mg/dL   TSH with reflex to Free T4 if abnormal   Result Value Ref Range    Thyroid Stimulating Hormone 0.24 (L) 0.44 - 3.98 mIU/L   Thyroxine, Free   Result Value Ref Range    Thyroxine, Free 1.14 (H) 0.61 - 1.12 ng/dL   Electrocardiogram, 12-lead PRN ACS symptoms   Result Value Ref Range    Ventricular Rate 67 BPM     Atrial Rate 67 BPM    OR Interval 132 ms    QRS Duration 88 ms    QT Interval 410 ms    QTC Calculation(Bazett) 433 ms    P Axis 62 degrees    R Axis 54 degrees    T Axis 25 degrees    QRS Count 11 beats    Q Onset 221 ms    P Onset 155 ms    P Offset 209 ms    T Offset 426 ms    QTC Fredericia 425 ms     ECG 12 lead    Result Date: 2/8/2025  Atrial fibrillation with rapid ventricular response ST & T wave abnormality, consider inferior ischemia or digitalis effect When compared with ECG of 07-FEB-2025 09:07, Atrial fibrillation has replaced Sinus rhythm Vent. rate has increased BY  89 BPM secondary rate related ST t changes Reconfirmed by Prabhjot Trejo ([id number]) on 2/8/2025 2:32:40 PM    Electrocardiogram, 12-lead PRN ACS symptoms    Result Date: 2/8/2025  Sinus rhythm with marked sinus arrhythmia ST abnormality, possible digitalis effect When compared with ECG of 25-SEP-2023 09:50, Premature atrial complexes are no longer Present Reconfirmed by Prabhjot Trejo ([id number]) on 2/8/2025 2:08:20 PM    ECG 12 lead    Result Date: 2/7/2025  Sinus rhythm with marked sinus arrhythmia ST abnormality, possible digitalis effect When compared with ECG of 25-SEP-2023 09:50, Premature atrial complexes are no longer Present Reconfirmed by Prabhjot Trejo ([id number]) on 2/7/2025 5:10:57 PM    XR chest 1 view    Result Date: 2/7/2025  STUDY: Chest Radiograph;  2/7/2025 9:51 AM INDICATION: Shortness of breath.  Chest pain. COMPARISON: None Available. ACCESSION NUMBER(S): [id number] ORDERING CLINICIAN: KENZIE ALFORD TECHNIQUE:  Frontal chest was obtained at 09:50 hours. FINDINGS: CARDIOMEDIASTINAL SILHOUETTE: Cardiomediastinal silhouette is normal in size and configuration.  LUNGS: Lungs are clear.  Emphysematous changes noted.  ABDOMEN: No remarkable upper abdominal findings.  BONES: No acute osseous changes.    No acute process. Signed by Manish Verduzco MD       Assessment/Plan   This very pleasant 84-year-old with a known history  of GERD and hypertension who presents to the hospital with ULI that is since resolved in the context of positive influenza A PCR, she is improving from a respiratory standpoint on Tamiflu, not requiring any oxygen, course complicated by atrial fibrillation with rapid ventricular response; appreciate cardiology consult, will use amiodarone, started on low-dose Eliquis and upgrade her to inpatient status; plan of care was discussed in detail with the patient at the bedside as well as her eldest son.  Assessment & Plan  Influenza A  -Upgraded to inpatient, continue with telemetry  -I certify that greater than 2 midnights will be required in the hospital to facilitate this patient's diagnosis and treatment of the presenting problems as documented above.  -Continue Tamiflu  -PT OT appreciated  Paroxysmal atrial fibrillation (Multi)  -Onset of atrial fibrillation after 7 AM  -Amiodarone bolus and drip  -If she does not convert the next several hours, she is being upgraded to inpatient and may be a good candidate to transfer down to the stepdown unit  -Continue metoprolol at current dosing  ULI (acute kidney injury) (CMS-HCC)  -Resolved    Diet: Regular  VTE ppx: enoxaparin  Code: FULL        I spent 35 minutes in the professional and overall care of this patient.    Tan Mackenzie MD

## 2025-02-08 NOTE — CONSULTS
[facility] Cardiology In-patient Consult Note    Consulting Cardiologist: Lexa Villeda MD  Primary Cardiologist:    Reason for Consult: New onset atrial fibrillation    Assessment/Plan:    New onset atrial fibrillation: Patient presented in normal sinus rhythm but has gone into atrial fibrillation here in the hospital.  Still not well rate controlled.  Will start oral anticoagulation with Eliquis dose to 2.5 mg twice daily given her age and weight less than 60 kg.  Will give another dose of IV metoprolol.  There is a good chance she will spontaneously convert with rate control.  If she does not in the next several hours may be reasonable to move her down to CCU and give her IV amiodarone to convert her to sinus rhythm given we know she was in sinus when she presented.  Check echocardiogram.  2.  Questionable history of mitral valve prolapse: Patient had echo at [facility] in 2018 showing no mitral valve prolapse and only trace mitral regurgitation.  Unlikely patient truly had mitral valve prolapse as a young woman as it certainly would not have resolved spontaneously.  3.  Coronary artery disease: Patient had cardiac cath in 2007 for chest pain and was found to have proximal LAD disease which was treated with a rather small stent only 2.5 mm and 20 mm long.  Circumflex and right coronary were unremarkable at that time.  No known procedures or diagnostic cath since then.  She is without chest pain.  Troponin was only minimally elevated consistent with her diagnosis of influenza.  She was not having any anginal symptoms prior to this hospital stay so I do not think ischemic workup is needed unless she has a new cardiomyopathy.  I would add aspirin 81 mg daily to the low-dose Eliquis.  This should be safe even at her age.  Continue statin therapy.      History Of Present Illness:    Norma Villasenor is a 84 y.o. female presenting with URI type symptoms and was diagnosed with influenza A.  She apparently has a  "prior cardiac history significant for stent procedure done in 2007 for chest discomfort.  She had a 2.5 mm Cypher stent placed in the LAD for a 70% stenosis.  No other CAD was noted.  As best I can tell she has had no subsequent diagnostic caths.  Additionally she was told that she had mitral valve prolapse apparently as a child and had been dealing with heart problems \"her entire life\".  However there is an echocardiogram from 2018 showing no evidence of mitral valve prolapse and only trace mitral regurgitation with normal LV function and really no significant cardiac abnormalities.  There is also a note that it was compared to an echocardiogram in 2007 stating that there was no significant change.       Past Medical History:  She has a past medical history of GERD (gastroesophageal reflux disease) and Hypertension.    Past Surgical History:  She has a past surgical history that includes Other surgical history; Coronary stent placement; Colonoscopy; Upper gastrointestinal endoscopy; and Cholecystectomy.    Problem List:  Patient Active Problem List   Diagnosis    Influenza A    ULI (acute kidney injury) (CMS-[facility])       Social History:  She reports that she has never smoked. She has been exposed to tobacco smoke. She has never used smokeless tobacco. She reports that she does not currently use alcohol. She reports that she does not use drugs.    Family History:  No family history on file.     Allergies:  Patient has no known allergies.    Inpatient Medications:  Scheduled medications   Medication Dose Route Frequency    [Held by provider] allopurinol  100 mg oral Daily    amLODIPine  5 mg oral Daily    apixaban  2.5 mg oral q12h    budesonide  0.5 mg nebulization BID    calcitriol  0.25 mcg oral Daily    formoterol  20 mcg nebulization BID    gabapentin  100 mg oral Nightly    ipratropium-albuteroL  3 mL nebulization TID    magnesium sulfate  2 g intravenous Once    metoprolol succinate XL  50 mg oral Nightly    " oseltamivir  30 mg oral Daily    pantoprazole  40 mg oral Daily    [Held by provider] potassium chloride CR  20 mEq oral Daily    rosuvastatin  5 mg oral Nightly    tiZANidine  2 mg oral Nightly     PRN medications   Medication    acetaminophen    Or    acetaminophen    Or    acetaminophen    benzocaine-menthol    dicyclomine    guaiFENesin    ipratropium-albuteroL    ondansetron    Or    ondansetron    polyethylene glycol     Continuous Medications   Medication Dose Last Rate       Outpatient Medications:  Current Outpatient Medications   Medication Instructions    allopurinol (ZYLOPRIM) 100 mg, oral, Daily    amLODIPine (NORVASC) 5 mg, oral, Daily    calcitriol (ROCALTROL) 0.25 mcg, oral, Daily    dicyclomine (BENTYL) 10 mg, oral, 3 times daily PRN    fluticasone furoate-vilanteroL (Breo Ellipta) 200-25 mcg/dose inhaler 1 puff, inhalation, Daily    gabapentin (NEURONTIN) 100 mg, oral, Nightly    meclizine (ANTIVERT) 25 mg, oral, 3 times daily PRN    metoprolol succinate XL (TOPROL-XL) 50 mg, oral, Nightly, Do not crush or chew.    pantoprazole (PROTONIX) 40 mg, oral, Daily    potassium chloride CR 20 mEq ER tablet 20 mEq, oral, Daily    rosuvastatin (CRESTOR) 5 mg, oral, Nightly    tiZANidine (ZANAFLEX) 2 mg, oral, Nightly       Last Recorded Vitals:  Vitals:    02/08/25 0821 02/08/25 0912 02/08/25 0925 02/08/25 1048   BP:  124/81 110/76 134/63   BP Location:  Right arm Right arm Right arm   Patient Position:  Lying Lying Lying   Pulse:  (!) [phone] 87   Resp:  18 18 18   Temp:  36 °C (96.8 °F)     TempSrc:  Temporal     SpO2: 93% 96% 95%    Weight:       Height:         Last I/O:  I/O last 3 completed shifts:  In: 2120 (39.6 mL/kg) [P.O.:120; I.V.:1000 (18.7 mL/kg); IV Piggyback:1000]  Out: - (0 mL/kg)   Weight: 53.5 kg     Physical Exam  Vitals reviewed.   Constitutional:       Appearance: Normal appearance.   Eyes:      Pupils: Pupils are equal, round, and reactive to light.   Neck:      Vascular: No JVD.    Cardiovascular:      Rate and Rhythm: Tachycardia present. Rhythm irregularly irregular.      Pulses: Normal pulses.      Heart sounds: Murmur heard.      Systolic murmur is present with a grade of 1/6.      No gallop.   Pulmonary:      Effort: No respiratory distress.      Breath sounds: Examination of the right-upper field reveals rhonchi. Examination of the left-upper field reveals rhonchi. Rhonchi present. No wheezing or rales.   Abdominal:      General: Abdomen is flat. There is no distension.      Palpations: Abdomen is soft.   Musculoskeletal:         General: No swelling.      Right lower leg: No edema.      Left lower leg: No edema.   Neurological:      General: No focal deficit present.      Mental Status: She is alert.   Psychiatric:         Mood and Affect: Mood normal.            Last Labs:  CBC - 2025:  5:14 AM  3.5 11.0 202    34.2      CMP - 2025:  5:14 AM  8.7 7.9 20 --- 0.3   _ 4.2 16 51      Troponin I, High Sensitivity   Date/Time Value Ref Range Status   2025 10:30 AM 15 (H) 0 - 13 ng/L Final   2025 09:23 AM 19 (H) 0 - 13 ng/L Final     BNP   Date/Time Value Ref Range Status   2025 09:23 AM 89 0 - 99 pg/mL Final       EKG:   Encounter Date: 25   ECG 12 lead   Result Value    Ventricular Rate 67    Atrial Rate 67    OR Interval 132    QRS Duration 88    QT Interval 410    QTC Calculation(Bazett) 433    P Axis 62    R Axis 54    T Axis 25    QRS Count 11    Q Onset 221    P Onset 155    P Offset 209    T Offset 426    QTC Fredericia 425    Narrative    Sinus rhythm with marked sinus arrhythmia  ST abnormality, possible digitalis effect  When compared with ECG of 25-SEP-2023 09:50,  Premature atrial complexes are no longer Present  Reconfirmed by Prabhjot Trejo ([id number]) on 2025 5:10:57 PM     EC.8.25: Atrial fibrillation with rapid ventricular response.        Lexa Villeda MD

## 2025-02-08 NOTE — CARE PLAN
Problem: Pain - Adult  Goal: Verbalizes/displays adequate comfort level or baseline comfort level  Outcome: Progressing     Problem: Safety - Adult  Goal: Free from fall injury  Outcome: Progressing     Problem: Discharge Planning  Goal: Discharge to home or other facility with appropriate resources  Outcome: Progressing   The patient's goals for the shift include  rest     The clinical goals for the shift include pt will remain safe and free from injury throughout shift    Pt appeared to rest comfortably throughout the night with no complaints of pain and VSS.

## 2025-02-08 NOTE — ASSESSMENT & PLAN NOTE
-Resolved   Stan Clement from [facility] called requesting to have Dr. Zara Lambert NPI number. Stan Clement stated she needs this so she can load patients information in to their system. Please advise.  Stan Clement is reachable at [phone]

## 2025-02-09 ENCOUNTER — APPOINTMENT (OUTPATIENT)
Dept: CARDIOLOGY | Facility: HOSPITAL | Age: 85
DRG: 193 | End: 2025-02-09
Payer: MEDICARE

## 2025-02-09 VITALS
TEMPERATURE: 97.3 F | RESPIRATION RATE: 18 BRPM | SYSTOLIC BLOOD PRESSURE: 147 MMHG | OXYGEN SATURATION: 98 % | BODY MASS INDEX: 23.79 KG/M2 | HEART RATE: 88 BPM | WEIGHT: 118 LBS | DIASTOLIC BLOOD PRESSURE: 75 MMHG | HEIGHT: 59 IN

## 2025-02-09 PROBLEM — R09.02 HYPOXIA: Status: ACTIVE | Noted: 2025-02-09

## 2025-02-09 LAB — BODY SURFACE AREA: 1.49 M2

## 2025-02-09 PROCEDURE — 93306 TTE W/DOPPLER COMPLETE: CPT | Performed by: INTERNAL MEDICINE

## 2025-02-09 PROCEDURE — 99232 SBSQ HOSP IP/OBS MODERATE 35: CPT | Performed by: INTERNAL MEDICINE

## 2025-02-09 PROCEDURE — 2500000004 HC RX 250 GENERAL PHARMACY W/ HCPCS (ALT 636 FOR OP/ED): Performed by: INTERNAL MEDICINE

## 2025-02-09 PROCEDURE — 2500000005 HC RX 250 GENERAL PHARMACY W/O HCPCS

## 2025-02-09 PROCEDURE — 1200000002 HC GENERAL ROOM WITH TELEMETRY DAILY

## 2025-02-09 PROCEDURE — 2500000002 HC RX 250 W HCPCS SELF ADMINISTERED DRUGS (ALT 637 FOR MEDICARE OP, ALT 636 FOR OP/ED)

## 2025-02-09 PROCEDURE — 2500000001 HC RX 250 WO HCPCS SELF ADMINISTERED DRUGS (ALT 637 FOR MEDICARE OP): Performed by: INTERNAL MEDICINE

## 2025-02-09 PROCEDURE — 2500000002 HC RX 250 W HCPCS SELF ADMINISTERED DRUGS (ALT 637 FOR MEDICARE OP, ALT 636 FOR OP/ED): Performed by: INTERNAL MEDICINE

## 2025-02-09 PROCEDURE — 99233 SBSQ HOSP IP/OBS HIGH 50: CPT | Performed by: INTERNAL MEDICINE

## 2025-02-09 PROCEDURE — 93005 ELECTROCARDIOGRAM TRACING: CPT

## 2025-02-09 PROCEDURE — 94640 AIRWAY INHALATION TREATMENT: CPT

## 2025-02-09 PROCEDURE — 93306 TTE W/DOPPLER COMPLETE: CPT

## 2025-02-09 RX ORDER — AMIODARONE HYDROCHLORIDE 200 MG/1
200 TABLET ORAL 2 TIMES DAILY
Status: DISCONTINUED | OUTPATIENT
Start: 2025-02-09 | End: 2025-02-09

## 2025-02-09 RX ORDER — POTASSIUM CHLORIDE 1.5 G/1.58G
20 POWDER, FOR SOLUTION ORAL ONCE
Status: COMPLETED | OUTPATIENT
Start: 2025-02-09 | End: 2025-02-09

## 2025-02-09 RX ORDER — MAGNESIUM SULFATE HEPTAHYDRATE 40 MG/ML
2 INJECTION, SOLUTION INTRAVENOUS ONCE
Status: COMPLETED | OUTPATIENT
Start: 2025-02-09 | End: 2025-02-09

## 2025-02-09 RX ADMIN — ALLOPURINOL 100 MG: 100 TABLET ORAL at 08:13

## 2025-02-09 RX ADMIN — GABAPENTIN 100 MG: 100 CAPSULE ORAL at 21:16

## 2025-02-09 RX ADMIN — TIZANIDINE 2 MG: 2 TABLET ORAL at 21:16

## 2025-02-09 RX ADMIN — MAGNESIUM SULFATE HEPTAHYDRATE 2 G: 40 INJECTION, SOLUTION INTRAVENOUS at 08:13

## 2025-02-09 RX ADMIN — BUDESONIDE 0.5 MG: 0.5 INHALANT RESPIRATORY (INHALATION) at 09:27

## 2025-02-09 RX ADMIN — FORMOTEROL FUMARATE 20 MCG: 20 SOLUTION RESPIRATORY (INHALATION) at 09:27

## 2025-02-09 RX ADMIN — Medication 2 L/MIN: at 08:00

## 2025-02-09 RX ADMIN — IPRATROPIUM BROMIDE AND ALBUTEROL SULFATE 3 ML: 2.5; .5 SOLUTION RESPIRATORY (INHALATION) at 20:24

## 2025-02-09 RX ADMIN — DRONEDARONE 400 MG: 400 TABLET, FILM COATED ORAL at 10:53

## 2025-02-09 RX ADMIN — PANTOPRAZOLE SODIUM 40 MG: 40 TABLET, DELAYED RELEASE ORAL at 08:13

## 2025-02-09 RX ADMIN — APIXABAN 2.5 MG: 2.5 TABLET, FILM COATED ORAL at 22:22

## 2025-02-09 RX ADMIN — DRONEDARONE 400 MG: 400 TABLET, FILM COATED ORAL at 21:16

## 2025-02-09 RX ADMIN — POTASSIUM CHLORIDE 20 MEQ: 1.5 POWDER, FOR SOLUTION ORAL at 08:12

## 2025-02-09 RX ADMIN — ASPIRIN 81 MG: 81 TABLET, COATED ORAL at 08:13

## 2025-02-09 RX ADMIN — IPRATROPIUM BROMIDE AND ALBUTEROL SULFATE 3 ML: 2.5; .5 SOLUTION RESPIRATORY (INHALATION) at 15:09

## 2025-02-09 RX ADMIN — APIXABAN 2.5 MG: 2.5 TABLET, FILM COATED ORAL at 10:53

## 2025-02-09 RX ADMIN — BUDESONIDE 0.5 MG: 0.5 INHALANT RESPIRATORY (INHALATION) at 20:24

## 2025-02-09 RX ADMIN — IPRATROPIUM BROMIDE AND ALBUTEROL SULFATE 3 ML: 2.5; .5 SOLUTION RESPIRATORY (INHALATION) at 09:27

## 2025-02-09 RX ADMIN — GUAIFENESIN 600 MG: 600 TABLET ORAL at 21:20

## 2025-02-09 RX ADMIN — OSELTAMAVIR PHOSPHATE 30 MG: 30 CAPSULE ORAL at 08:13

## 2025-02-09 RX ADMIN — FORMOTEROL FUMARATE 20 MCG: 20 SOLUTION RESPIRATORY (INHALATION) at 20:24

## 2025-02-09 RX ADMIN — AMLODIPINE BESYLATE 5 MG: 5 TABLET ORAL at 08:13

## 2025-02-09 RX ADMIN — CALCITRIOL CAPSULES 0.25 MCG 0.25 MCG: 0.25 CAPSULE ORAL at 08:13

## 2025-02-09 RX ADMIN — Medication 2 L/MIN: at 20:00

## 2025-02-09 RX ADMIN — METOPROLOL SUCCINATE 50 MG: 50 TABLET, EXTENDED RELEASE ORAL at 21:16

## 2025-02-09 RX ADMIN — ROSUVASTATIN CALCIUM 5 MG: 5 TABLET, FILM COATED ORAL at 21:16

## 2025-02-09 ASSESSMENT — COGNITIVE AND FUNCTIONAL STATUS - GENERAL
MOBILITY SCORE: 21
DAILY ACTIVITIY SCORE: 20
CLIMB 3 TO 5 STEPS WITH RAILING: A LITTLE
MOVING TO AND FROM BED TO CHAIR: A LITTLE
HELP NEEDED FOR BATHING: A LITTLE
DRESSING REGULAR LOWER BODY CLOTHING: A LITTLE
WALKING IN HOSPITAL ROOM: A LITTLE
DRESSING REGULAR UPPER BODY CLOTHING: A LITTLE
TOILETING: A LITTLE

## 2025-02-09 ASSESSMENT — PAIN - FUNCTIONAL ASSESSMENT: PAIN_FUNCTIONAL_ASSESSMENT: 0-10

## 2025-02-09 ASSESSMENT — PAIN SCALES - GENERAL
PAINLEVEL_OUTOF10: 0 - NO PAIN
PAINLEVEL_OUTOF10: 0 - NO PAIN

## 2025-02-09 NOTE — PROGRESS NOTES
Norma Villasenor is a 84 y.o. female on day 1 of admission presenting with Influenza A.    Subjective   Patient said that she was feeling better overall, she was still requiring minimal oxygen supplementation at 2 L/min, she felt like her breathing effort was much improved; overnight she did have a bout of atrial fibrillation with RVR that broke back to a normal sinus rhythm after 5 mg IV metoprolol was administered by the nighttime covering physician.  She does not feel like her heart is racing, she is satisfied that she is of atrial fibrillation.  She otherwise declined having any fevers or chills and thinks that she is starting to feel better from the influenza standpoint.  Attempted to call the patient's son several times without any answer, but will try again later in the day.    Objective     Physical Exam  Vitals reviewed.   Constitutional:       General: She is not in acute distress.     Appearance: Normal appearance. She is not ill-appearing.   HENT:      Head: Normocephalic and atraumatic.      Nose: Nose normal.      Mouth/Throat:      Mouth: Mucous membranes are moist.   Eyes:      Extraocular Movements: Extraocular movements intact.      Pupils: Pupils are equal, round, and reactive to light.   Cardiovascular:      Rate and Rhythm: normal rate present today. Rhythm regular.      Pulses: Normal pulses.      Heart sounds: Normal heart sounds. No murmur heard.     No friction rub. No gallop.   Pulmonary:      Effort: Pulmonary effort is normal. No respiratory distress.      Breath sounds: Rhonchi (Scattered and diffuse), although improved from prior day exam. No wheezing or rales.   Abdominal:      General: Abdomen is flat. Bowel sounds are normal. There is no distension.      Palpations: Abdomen is soft. There is no mass.      Tenderness: There is no abdominal tenderness. There is no guarding or rebound.   Musculoskeletal:         General: No swelling, tenderness or signs of injury.      Right lower leg: No  "edema.      Left lower leg: No edema.   Skin:     General: Skin is warm and dry.      Coloration: Skin is not jaundiced or pale.      Findings: No bruising, erythema, lesion or rash.   Neurological:      General: No focal deficit present.      Mental Status: She is alert and oriented to person, place, and time. Mental status is at baseline.      Cranial Nerves: No cranial nerve deficit.      Motor: No weakness.   Psychiatric:         Mood and Affect: Mood normal.         Behavior: Behavior normal.         Thought Content: Thought content normal.         Judgment: Judgment normal.     Last Recorded Vitals  Blood pressure 116/60, pulse 69, temperature 36 °C (96.8 °F), resp. rate 18, height 1.499 m (4' 11\"), weight 53.5 kg (118 lb), SpO2 98%.  Intake/Output last 3 Shifts:  I/O last 3 completed shifts:  In: 1327.5 (24.8 mL/kg) [P.O.:600; I.V.:727.5 (13.6 mL/kg)]  Out: - (0 mL/kg)   Weight: 53.5 kg     Relevant Results  Scheduled medications  allopurinol, 100 mg, oral, Daily  amLODIPine, 5 mg, oral, Daily  apixaban, 2.5 mg, oral, q12h  aspirin, 81 mg, oral, Daily  budesonide, 0.5 mg, nebulization, BID  calcitriol, 0.25 mcg, oral, Daily  dronedarone, 400 mg, oral, BID  formoterol, 20 mcg, nebulization, BID  gabapentin, 100 mg, oral, Nightly  ipratropium-albuteroL, 3 mL, nebulization, TID  metoprolol succinate XL, 50 mg, oral, Nightly  oseltamivir, 30 mg, oral, Daily  oxygen, , inhalation, Continuous - Inhalation  pantoprazole, 40 mg, oral, Daily  perflutren lipid microspheres, 0.5-10 mL of dilution, intravenous, Once in imaging  perflutren lipid microspheres, 0.5-10 mL of dilution, intravenous, Once in imaging  perflutren protein A microsphere, 0.5 mL, intravenous, Once in imaging  perflutren protein A microsphere, 0.5 mL, intravenous, Once in imaging  [Held by provider] potassium chloride CR, 20 mEq, oral, Daily  rosuvastatin, 5 mg, oral, Nightly  sulfur hexafluoride microsphr, 2 mL, intravenous, Once in imaging  sulfur " hexafluoride microsphr, 2 mL, intravenous, Once in imaging  tiZANidine, 2 mg, oral, Nightly      Continuous medications     PRN medications  PRN medications: acetaminophen **OR** acetaminophen **OR** acetaminophen, benzocaine-menthol, dicyclomine, guaiFENesin, ipratropium-albuteroL, ondansetron **OR** [DISCONTINUED] ondansetron, polyethylene glycol    No results found for this or any previous visit (from the past 24 hours).    ECG 12 lead    Result Date: 2/8/2025  Atrial fibrillation with rapid ventricular response ST & T wave abnormality, consider inferior ischemia or digitalis effect When compared with ECG of 07-FEB-2025 09:07, Atrial fibrillation has replaced Sinus rhythm Vent. rate has increased BY  89 BPM secondary rate related ST t changes Reconfirmed by Prabhjot Trejo ([id number]) on 2/8/2025 2:32:40 PM    Electrocardiogram, 12-lead PRN ACS symptoms    Result Date: 2/8/2025  Sinus rhythm with marked sinus arrhythmia ST abnormality, possible digitalis effect When compared with ECG of 25-SEP-2023 09:50, Premature atrial complexes are no longer Present Reconfirmed by Prabhjot Trejo ([id number]) on 2/8/2025 2:08:20 PM    ECG 12 lead    Result Date: 2/7/2025  Sinus rhythm with marked sinus arrhythmia ST abnormality, possible digitalis effect When compared with ECG of 25-SEP-2023 09:50, Premature atrial complexes are no longer Present Reconfirmed by Prabhjot Trejo ([id number]) on 2/7/2025 5:10:57 PM    XR chest 1 view    Result Date: 2/7/2025  STUDY: Chest Radiograph;  2/7/2025 9:51 AM INDICATION: Shortness of breath.  Chest pain. COMPARISON: None Available. ACCESSION NUMBER(S): [id number] ORDERING CLINICIAN: KENZIE ALFORD TECHNIQUE:  Frontal chest was obtained at 09:50 hours. FINDINGS: CARDIOMEDIASTINAL SILHOUETTE: Cardiomediastinal silhouette is normal in size and configuration.  LUNGS: Lungs are clear.  Emphysematous changes noted.  ABDOMEN: No remarkable upper abdominal findings.  BONES: No acute osseous changes.    No  acute process. Signed by Manish Verduzco MD       Assessment/Plan   This very pleasant 84-year-old with a known history of GERD and hypertension who presents to the hospital with ULI that is since resolved in the context of positive influenza A PCR, she is improving from a respiratory standpoint on Tamiflu, requiring minimal oxygen, course complicated by atrial fibrillation with rapid ventricular response that has broken back to normal sinus rhythm with amiodarone bolus, breakthrough episode overnight converted back to NSR after metoprolol IV dosing; will start on Multaq instead of amiodarone for maintenance given the favorable side effect profile on recommendation of cardiology; she is tolerating the Eliquis without issue, continue to work on incentive spirometer and weaning oxygen as tolerated.  Assessment & Plan  Influenza A  -Continue current level of care on inpatient, continue with telemetry  -Continue Tamiflu  -oxygen supplementation, incentive spirometer, flutter valve device  -clinically improved  -PT OT appreciated  Hypoxia  -stable on oxygen supplementation, wean as tolerated, incentive spirometer  -prn duoneb therapy  Paroxysmal atrial fibrillation (Multi)  -Onset of atrial fibrillation after 7 AM yesterday, back in NSR today  -Amiodarone bolus and drip have been stopped, Multaq per cardiology  -continue with eliquis  -Continue metoprolol at current dosing  ULI (acute kidney injury) (CMS-HCC)  -Resolved    Diet: 2 to 3 g sodium  VTE PPx: Eliquis  Code: Full         I spent 35 minutes in the professional and overall care of this patient.    Tan Mackenzie MD

## 2025-02-09 NOTE — CARE PLAN
The patient's goals for the shift include    Problem: Pain - Adult  Goal: Verbalizes/displays adequate comfort level or baseline comfort level  Outcome: Progressing     Problem: Safety - Adult  Goal: Free from fall injury  Outcome: Progressing     Problem: Discharge Planning  Goal: Discharge to home or other facility with appropriate resources  Outcome: Progressing     Problem: Chronic Conditions and Co-morbidities  Goal: Patient's chronic conditions and co-morbidity symptoms are monitored and maintained or improved  Outcome: Progressing     Problem: Nutrition  Goal: Nutrient intake appropriate for maintaining nutritional needs  Outcome: Progressing     Problem: Respiratory  Goal: Clear secretions with interventions this shift  Outcome: Progressing  Goal: Minimize anxiety/maximize coping throughout shift  Outcome: Progressing  Goal: Minimal/no exertional discomfort or dyspnea this shift  Outcome: Progressing  Goal: No signs of respiratory distress (eg. Use of accessory muscles. Peds grunting)  Outcome: Progressing  Goal: Verbalize decreased shortness of breath this shift  Outcome: Progressing  Goal: Increase self care and/or family involvement in next 24 hours  Outcome: Progressing       The clinical goals for the shift include pt will remain HDS throughout this shift

## 2025-02-09 NOTE — ASSESSMENT & PLAN NOTE
-Onset of atrial fibrillation after 7 AM yesterday, back in NSR today  -Amiodarone bolus and drip have been stopped, Multaq per cardiology  -continue with eliquis  -Continue metoprolol at current dosing

## 2025-02-09 NOTE — PROGRESS NOTES
"  Patient: Norma Villasenor  : 1940  MRN: [id number]    Today's Date: 25  Hospital Day: #1    Primary Cardiologist:    ASSESSMENT/PLAN:  Paroxysmal atrial fibrillation: Patient remains in normal sinus rhythm with a significant amount of supraventricular ectopy.  Given her underlying COPD and the current influenza she is going to be at increased risk for recurrence of atrial fibrillation.  Given her underlying COPD I would like to avoid amiodarone.   Multaq would actually be a decent choice given the low A-fib burden seen so far and less side effect profile than amiodarone even though it is not as effective as amiodarone.  Continue oral anticoagulation.  Dyspnea: This seems to be mostly pulmonary related.  BNP was normal.  Await echocardiogram.        SUBJECTIVE:  Patient continues to have  nonproductive cough with shortness of breath but no chest discomfort.  Telemetry was reviewed.  Still was having some significant amount of supraventricular ectopy and possibly a little bit of breakthrough A-fib last night.  Currently in sinus rhythm with premature atrial contractions.    OBJECTIVE:  VITALS: /60   Pulse 66   Temp 36 °C (96.8 °F)   Resp 18   Ht 1.499 m (4' 11\")   Wt 53.5 kg (118 lb)   SpO2 98%   BMI 23.83 kg/m²       Intake/Output Summary (Last 24 hours) at 2025 1007  Last data filed at 2025 1425  Gross per 24 hour   Intake 530 ml   Output --   Net 530 ml       Physical Exam  Vitals reviewed.   Cardiovascular:      Rate and Rhythm: Normal rate. Rhythm irregular.      Heart sounds: Heart sounds are distant. No murmur heard.  Pulmonary:      Effort: Pulmonary effort is normal. No respiratory distress.      Breath sounds: Examination of the right-middle field reveals rhonchi. Examination of the left-middle field reveals rhonchi. Examination of the right-lower field reveals rhonchi. Examination of the left-lower field reveals rhonchi. Rhonchi present. No wheezing or rales.   Abdominal:    "   General: Abdomen is flat. There is no distension.      Palpations: Abdomen is soft.   Musculoskeletal:      Right lower leg: No edema.      Left lower leg: No edema.   Skin:     General: Skin is warm and dry.   Neurological:      General: No focal deficit present.      Mental Status: She is alert and oriented to person, place, and time.   Psychiatric:         Mood and Affect: Mood normal.          Meds:Scheduled medications  allopurinol, 100 mg, oral, Daily  amiodarone, 200 mg, oral, BID  amLODIPine, 5 mg, oral, Daily  apixaban, 2.5 mg, oral, q12h  aspirin, 81 mg, oral, Daily  budesonide, 0.5 mg, nebulization, BID  calcitriol, 0.25 mcg, oral, Daily  formoterol, 20 mcg, nebulization, BID  gabapentin, 100 mg, oral, Nightly  ipratropium-albuteroL, 3 mL, nebulization, TID  magnesium sulfate, 2 g, intravenous, Once  metoprolol succinate XL, 50 mg, oral, Nightly  oseltamivir, 30 mg, oral, Daily  oxygen, , inhalation, Continuous - Inhalation  pantoprazole, 40 mg, oral, Daily  perflutren lipid microspheres, 0.5-10 mL of dilution, intravenous, Once in imaging  perflutren lipid microspheres, 0.5-10 mL of dilution, intravenous, Once in imaging  perflutren protein A microsphere, 0.5 mL, intravenous, Once in imaging  perflutren protein A microsphere, 0.5 mL, intravenous, Once in imaging  [Held by provider] potassium chloride CR, 20 mEq, oral, Daily  rosuvastatin, 5 mg, oral, Nightly  sulfur hexafluoride microsphr, 2 mL, intravenous, Once in imaging  sulfur hexafluoride microsphr, 2 mL, intravenous, Once in imaging  tiZANidine, 2 mg, oral, Nightly      Continuous medications     PRN medications  PRN medications: acetaminophen **OR** acetaminophen **OR** acetaminophen, benzocaine-menthol, dicyclomine, guaiFENesin, ipratropium-albuteroL, ondansetron **OR** ondansetron, polyethylene glycol    Infusions:     DIAGNOSTIC DATA:  Results for orders placed or performed during the hospital encounter of 02/07/25 (from the past 24 hours)    Electrocardiogram, 12-lead PRN ACS symptoms   Result Value Ref Range    Ventricular Rate 67 BPM    Atrial Rate 67 BPM    RI Interval 132 ms    QRS Duration 88 ms    QT Interval 410 ms    QTC Calculation(Bazett) 433 ms    P Axis 62 degrees    R Axis 54 degrees    T Axis 25 degrees    QRS Count 11 beats    Q Onset 221 ms    P Onset 155 ms    P Offset 209 ms    T Offset 426 ms    QTC Fredericia 425 ms             No results found for this or any previous visit from the past 365 days.        Lexa Villeda MD

## 2025-02-09 NOTE — ASSESSMENT & PLAN NOTE
-Continue current level of care on inpatient, continue with telemetry  -Continue Tamiflu  -oxygen supplementation, incentive spirometer, flutter valve device  -clinically improved  -PT OT appreciated

## 2025-02-09 NOTE — CARE PLAN
The patient's goals for the shift include      The clinical goals for the shift include pt will remain HDS this shift    Goals progressing, safety maintained. Pt receiving Tamiflu and aerosols.

## 2025-02-10 ENCOUNTER — APPOINTMENT (OUTPATIENT)
Dept: CARDIOLOGY | Facility: HOSPITAL | Age: 85
DRG: 193 | End: 2025-02-10
Payer: MEDICARE

## 2025-02-10 LAB
AORTIC VALVE PEAK VELOCITY: 1.39 M/S
ATRIAL RATE: 163 BPM
ATRIAL RATE: 52 BPM
ATRIAL RATE: 70 BPM
ATRIAL RATE: 76 BPM
AV PEAK GRADIENT: 8 MMHG
AVA (PEAK VEL): 2.02 CM2
EJECTION FRACTION APICAL 4 CHAMBER: 56.1
EJECTION FRACTION: 63 %
LEFT VENTRICLE INTERNAL DIMENSION DIASTOLE: 3.8 CM (ref 3.5–6)
LEFT VENTRICULAR OUTFLOW TRACT DIAMETER: 1.9 CM
LV EJECTION FRACTION BIPLANE: 61 %
MITRAL VALVE E/A RATIO: 0.72
P AXIS: 49 DEGREES
P AXIS: 60 DEGREES
P AXIS: 65 DEGREES
P OFFSET: 203 MS
P OFFSET: 206 MS
P OFFSET: 209 MS
P ONSET: 151 MS
P ONSET: 153 MS
P ONSET: 156 MS
PR INTERVAL: 132 MS
PR INTERVAL: 136 MS
PR INTERVAL: 138 MS
Q ONSET: 219 MS
Q ONSET: 219 MS
Q ONSET: 225 MS
Q ONSET: 228 MS
QRS COUNT: 11 BEATS
QRS COUNT: 12 BEATS
QRS COUNT: 15 BEATS
QRS COUNT: 20 BEATS
QRS DURATION: 76 MS
QRS DURATION: 84 MS
QRS DURATION: 84 MS
QRS DURATION: 86 MS
QT INTERVAL: 298 MS
QT INTERVAL: 376 MS
QT INTERVAL: 404 MS
QT INTERVAL: 424 MS
QTC CALCULATION(BAZETT): 419 MS
QTC CALCULATION(BAZETT): 454 MS
QTC CALCULATION(BAZETT): 457 MS
QTC CALCULATION(BAZETT): 472 MS
QTC FREDERICIA: 374 MS
QTC FREDERICIA: 436 MS
QTC FREDERICIA: 438 MS
QTC FREDERICIA: 446 MS
R AXIS: 161 DEGREES
R AXIS: 34 DEGREES
R AXIS: 36 DEGREES
R AXIS: 41 DEGREES
RIGHT VENTRICLE FREE WALL PEAK S': 19.6 CM/S
RIGHT VENTRICLE PEAK SYSTOLIC PRESSURE: 26.4 MMHG
T AXIS: 171 DEGREES
T AXIS: 31 DEGREES
T AXIS: 37 DEGREES
T AXIS: 47 DEGREES
T OFFSET: 377 MS
T OFFSET: 407 MS
T OFFSET: 421 MS
T OFFSET: 437 MS
TRICUSPID ANNULAR PLANE SYSTOLIC EXCURSION: 1.7 CM
VENTRICULAR RATE: 119 BPM
VENTRICULAR RATE: 70 BPM
VENTRICULAR RATE: 76 BPM
VENTRICULAR RATE: 95 BPM

## 2025-02-10 PROCEDURE — 93005 ELECTROCARDIOGRAM TRACING: CPT

## 2025-02-10 PROCEDURE — 2500000002 HC RX 250 W HCPCS SELF ADMINISTERED DRUGS (ALT 637 FOR MEDICARE OP, ALT 636 FOR OP/ED)

## 2025-02-10 PROCEDURE — 93010 ELECTROCARDIOGRAM REPORT: CPT | Performed by: INTERNAL MEDICINE

## 2025-02-10 PROCEDURE — 2500000005 HC RX 250 GENERAL PHARMACY W/O HCPCS

## 2025-02-10 PROCEDURE — 99232 SBSQ HOSP IP/OBS MODERATE 35: CPT | Performed by: INTERNAL MEDICINE

## 2025-02-10 PROCEDURE — 1200000002 HC GENERAL ROOM WITH TELEMETRY DAILY

## 2025-02-10 PROCEDURE — 2500000004 HC RX 250 GENERAL PHARMACY W/ HCPCS (ALT 636 FOR OP/ED): Performed by: INTERNAL MEDICINE

## 2025-02-10 PROCEDURE — 94640 AIRWAY INHALATION TREATMENT: CPT

## 2025-02-10 PROCEDURE — 97161 PT EVAL LOW COMPLEX 20 MIN: CPT | Mod: GP

## 2025-02-10 PROCEDURE — 97165 OT EVAL LOW COMPLEX 30 MIN: CPT | Mod: GO

## 2025-02-10 PROCEDURE — 2500000001 HC RX 250 WO HCPCS SELF ADMINISTERED DRUGS (ALT 637 FOR MEDICARE OP): Performed by: INTERNAL MEDICINE

## 2025-02-10 PROCEDURE — 2500000002 HC RX 250 W HCPCS SELF ADMINISTERED DRUGS (ALT 637 FOR MEDICARE OP, ALT 636 FOR OP/ED): Performed by: INTERNAL MEDICINE

## 2025-02-10 RX ORDER — OSELTAMIVIR PHOSPHATE 30 MG/1
30 CAPSULE ORAL 2 TIMES DAILY
Status: DISCONTINUED | OUTPATIENT
Start: 2025-02-10 | End: 2025-02-11 | Stop reason: HOSPADM

## 2025-02-10 RX ADMIN — TIZANIDINE 2 MG: 2 TABLET ORAL at 21:01

## 2025-02-10 RX ADMIN — IPRATROPIUM BROMIDE AND ALBUTEROL SULFATE 3 ML: 2.5; .5 SOLUTION RESPIRATORY (INHALATION) at 07:51

## 2025-02-10 RX ADMIN — Medication 2 L/MIN: at 08:00

## 2025-02-10 RX ADMIN — FORMOTEROL FUMARATE 20 MCG: 20 SOLUTION RESPIRATORY (INHALATION) at 07:51

## 2025-02-10 RX ADMIN — BUDESONIDE 0.5 MG: 0.5 INHALANT RESPIRATORY (INHALATION) at 20:44

## 2025-02-10 RX ADMIN — IPRATROPIUM BROMIDE AND ALBUTEROL SULFATE 3 ML: 2.5; .5 SOLUTION RESPIRATORY (INHALATION) at 15:01

## 2025-02-10 RX ADMIN — Medication 2 L/MIN: at 19:30

## 2025-02-10 RX ADMIN — PANTOPRAZOLE SODIUM 40 MG: 40 TABLET, DELAYED RELEASE ORAL at 08:29

## 2025-02-10 RX ADMIN — IPRATROPIUM BROMIDE AND ALBUTEROL SULFATE 3 ML: 2.5; .5 SOLUTION RESPIRATORY (INHALATION) at 20:44

## 2025-02-10 RX ADMIN — DRONEDARONE 400 MG: 400 TABLET, FILM COATED ORAL at 21:01

## 2025-02-10 RX ADMIN — OSELTAMAVIR PHOSPHATE 30 MG: 30 CAPSULE ORAL at 21:01

## 2025-02-10 RX ADMIN — AMLODIPINE BESYLATE 5 MG: 5 TABLET ORAL at 08:29

## 2025-02-10 RX ADMIN — FORMOTEROL FUMARATE 20 MCG: 20 SOLUTION RESPIRATORY (INHALATION) at 20:44

## 2025-02-10 RX ADMIN — ROSUVASTATIN CALCIUM 5 MG: 5 TABLET, FILM COATED ORAL at 21:00

## 2025-02-10 RX ADMIN — BUDESONIDE 0.5 MG: 0.5 INHALANT RESPIRATORY (INHALATION) at 07:51

## 2025-02-10 RX ADMIN — Medication 2 L/MIN: at 20:44

## 2025-02-10 RX ADMIN — GABAPENTIN 100 MG: 100 CAPSULE ORAL at 21:01

## 2025-02-10 RX ADMIN — ALLOPURINOL 100 MG: 100 TABLET ORAL at 08:29

## 2025-02-10 RX ADMIN — APIXABAN 2.5 MG: 2.5 TABLET, FILM COATED ORAL at 10:33

## 2025-02-10 RX ADMIN — METOPROLOL SUCCINATE 50 MG: 50 TABLET, EXTENDED RELEASE ORAL at 21:01

## 2025-02-10 RX ADMIN — GUAIFENESIN 600 MG: 600 TABLET ORAL at 21:00

## 2025-02-10 RX ADMIN — DRONEDARONE 400 MG: 400 TABLET, FILM COATED ORAL at 08:29

## 2025-02-10 RX ADMIN — CALCITRIOL CAPSULES 0.25 MCG 0.25 MCG: 0.25 CAPSULE ORAL at 08:29

## 2025-02-10 RX ADMIN — ASPIRIN 81 MG: 81 TABLET, COATED ORAL at 08:29

## 2025-02-10 RX ADMIN — OSELTAMAVIR PHOSPHATE 30 MG: 30 CAPSULE ORAL at 08:29

## 2025-02-10 ASSESSMENT — PAIN SCALES - GENERAL
PAINLEVEL_OUTOF10: 0 - NO PAIN

## 2025-02-10 ASSESSMENT — COGNITIVE AND FUNCTIONAL STATUS - GENERAL
TOILETING: A LITTLE
TURNING FROM BACK TO SIDE WHILE IN FLAT BAD: A LITTLE
DRESSING REGULAR LOWER BODY CLOTHING: A LITTLE
WALKING IN HOSPITAL ROOM: A LITTLE
DRESSING REGULAR LOWER BODY CLOTHING: A LITTLE
MOBILITY SCORE: 21
DAILY ACTIVITIY SCORE: 19
MOVING TO AND FROM BED TO CHAIR: A LITTLE
HELP NEEDED FOR BATHING: A LITTLE
MOBILITY SCORE: 19
PERSONAL GROOMING: A LITTLE
MOBILITY SCORE: 21
MOVING TO AND FROM BED TO CHAIR: A LITTLE
DRESSING REGULAR UPPER BODY CLOTHING: A LITTLE
WALKING IN HOSPITAL ROOM: A LITTLE
MOVING TO AND FROM BED TO CHAIR: A LITTLE
DRESSING REGULAR LOWER BODY CLOTHING: A LITTLE
TOILETING: A LITTLE
DRESSING REGULAR UPPER BODY CLOTHING: A LITTLE
PERSONAL GROOMING: A LITTLE
DAILY ACTIVITIY SCORE: 22
DAILY ACTIVITIY SCORE: 19
WALKING IN HOSPITAL ROOM: A LITTLE
STANDING UP FROM CHAIR USING ARMS: A LITTLE
CLIMB 3 TO 5 STEPS WITH RAILING: A LITTLE
CLIMB 3 TO 5 STEPS WITH RAILING: A LITTLE
HELP NEEDED FOR BATHING: A LITTLE
HELP NEEDED FOR BATHING: A LITTLE
CLIMB 3 TO 5 STEPS WITH RAILING: A LITTLE

## 2025-02-10 ASSESSMENT — PAIN - FUNCTIONAL ASSESSMENT
PAIN_FUNCTIONAL_ASSESSMENT: 0-10
PAIN_FUNCTIONAL_ASSESSMENT: 0-10

## 2025-02-10 ASSESSMENT — ACTIVITIES OF DAILY LIVING (ADL)
BATHING_ASSISTANCE: STAND BY
ADL_ASSISTANCE: INDEPENDENT
ADL_ASSISTANCE: INDEPENDENT

## 2025-02-10 NOTE — PROGRESS NOTES
Physical Therapy    Physical Therapy Evaluation    Patient Name: Norma Villasenor  MRN: [id number]  Today's Date: 2/10/2025   Time Calculation  Start Time: 1144  Stop Time: 1159  Time Calculation (min): 15 min  3126/3126-A    Assessment/Plan   PT Assessment  PT Assessment Results: Decreased strength, Decreased endurance, Impaired balance, Decreased mobility  Rehab Prognosis: Good  Evaluation/Treatment Tolerance: Patient tolerated treatment well  Medical Staff Made Aware: Yes  End of Session Communication: Bedside nurse  Assessment Comment: Pt presents today below baseline level of function and requires continued PT during hospital stay. Pt requires PT at a low intensity level at discharge to maximize functional mobility and safety.    End of Session Patient Position: Up in chair, Alarm on  IP OR SWING BED PT PLAN  Inpatient or Swing Bed: Inpatient  PT Plan  Treatment/Interventions: Bed mobility, Transfer training, Gait training, Balance training, Neuromuscular re-education, Strengthening, Endurance training, Range of motion, Therapeutic exercise, Therapeutic activity, Home exercise program  PT Plan: Ongoing PT  PT Frequency: 3 times per week  PT Discharge Recommendations: Low intensity level of continued care  PT Recommended Transfer Status: Assist x1  PT - OK to Discharge: Yes (To next level of care when cleared by medical team   )    Subjective       General Visit Information:  General  Reason for Referral: impaired mobility  Referred By: Tan Mackenzie MD  Past Medical History Relevant to Rehab: To hospital with diarrhea and SOB. Pt found to be flu positive. 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    Home Living:  Home Living  Type of Home: House  Lives With: Adult children (daughter in law and granddaughter)  Home Adaptive Equipment: Walker rolling or standard, Cane  Home Layout: Stairs to alternate level with rails (split level)  Alternate Level Stairs-Number of Steps: 9 stairs up to bed/tub shower, 6 stairs down to 1/2 bath  Home Access:  (2 TARA)  Bathroom Shower/Tub: Tub/shower unit  Bathroom Equipment: Grab bars in shower    Prior Level of Function:  Prior Function Per Pt/Caregiver Report  ADL Assistance: Independent  Ambulatory Assistance: Independent (mod I with cane in community)    Precautions:  Precautions  Hearing/Visual Limitations: [place]  Medical Precautions: Fall precautions, Infection precautions (droplet)    Vital Signs:  Vital Signs  SpO2:  (97% on RA at EOB, 89% on RA with ambulation. Redonned 2L O2 and pt recovered to 96% on 2L. RN informed)  Objective     Pain:  Pain Assessment  Pain Assessment: 0-10  0-10 (Numeric) Pain Score: 0 - No pain    Cognition:  Cognition  Overall Cognitive Status: Within Functional Limits  Orientation Level: Oriented X4    General Assessments:      Activity Tolerance  Endurance: Tolerates 10 - 20 min exercise with multiple rests                 Static Sitting Balance  Static Sitting-Comment/Number of Minutes: good  Dynamic Sitting Balance  Dynamic Sitting-Comments: good  Static Standing Balance  Static Standing-Comment/Number of Minutes: good  Dynamic Standing Balance  Dynamic Standing-Comments: good-    Functional Assessments:     Bed Mobility  Bed Mobility: Yes  Bed Mobility 1  Bed Mobility 1: Supine to sitting  Level of Assistance 1: Close supervision  Transfers  Transfer: Yes  Transfer 1  Transfer From 1: Sit to  Transfer to 1: Stand  Transfer Device 1: Gait belt  Transfer Level of Assistance 1: Contact guard  Transfers 2  Transfer From 2: Stand to  Transfer to 2: Sit  Transfer Device 2: Gait belt  Transfer Level of Assistance 2: Contact  guard  Ambulation/Gait Training  Ambulation/Gait Training Performed: Yes  Ambulation/Gait Training 1  Gait Support Devices: Gait belt  Assistance 1: Contact guard  Quality of Gait 1: Decreased step length  Comments/Distance (ft) 1: 20 feet x 2          Extremity/Trunk Assessments:        RLE   RLE : Within Functional Limits  LLE   LLE : Within Functional Limits    Outcome Measures:     [facility] Basic Mobility  Turning from your back to your side while in a flat bed without using bedrails: None  Moving from lying on your back to sitting on the side of a flat bed without using bedrails: A little  Moving to and from bed to chair (including a wheelchair): A little  Standing up from a chair using your arms (e.g. wheelchair or bedside chair): A little  To walk in hospital room: A little  Climbing 3-5 steps with railing: A little  Basic Mobility - Total Score: 19             Goals:  Encounter Problems       Encounter Problems (Active)       PT Problem       Pt will perform bed mobility with mod I.  (Progressing)       Start:  02/10/25    Expected End:  02/24/25            Pt will complete sit <> stand and bed <> chair transfers with mod I.  (Progressing)       Start:  02/10/25    Expected End:  02/24/25            Pt will ambulate 150 feet mod I with no significant gait deviations.   (Progressing)       Start:  02/10/25    Expected End:  02/24/25            Pt will ascend/descend at least 9 stairs using rail SBA in order to navigate home environment.   (Progressing)       Start:  02/10/25    Expected End:  02/24/25                 Education Documentation  Precautions, taught by Amalia Bacon, PT at 2/10/2025  1:58 PM.  Learner: Patient  Readiness: Acceptance  Method: Explanation  Response: Verbalizes Understanding    Body Mechanics, taught by Amalia Bacon, PT at 2/10/2025  1:58 PM.  Learner: Patient  Readiness: Acceptance  Method: Explanation  Response: Verbalizes Understanding    Mobility Training, taught by Amalia  Simi Bacon, PT at 2/10/2025  1:58 PM.  Learner: Patient  Readiness: Acceptance  Method: Explanation  Response: Verbalizes Understanding    Education Comments  No comments found.

## 2025-02-10 NOTE — PROGRESS NOTES
Norma Villasenor is a 84 y.o. female on day 2 of admission presenting with Influenza A.    Subjective   Patient was sitting up in the chair; her son, Jose was called on speaker phone on the patient's cell phone during the encounter; the patient said that while she was saturating okay on her 2 L/min of oxygen supplementation and it had been taken off and she maintain the saturations, she did feel a little bit more comfortable with it on; she did drop down to 89% with ambulating to the restroom, she also felt like she was a little bit more raspy today but felt her breathing effort was improved.  On review of her telemetry from overnight, she maintained in sinus rhythm, she was feeling well enough that she thought she might be able to go home although had a little bit of wheezing and was concerned given her asthma history and thought she would benefit from an additional night in the hospital; her son Jose, agreed and thought they could accommodate her being home if they were confident that her respiratory status was stable; otherwise they are satisfied that she was tolerating the Eliquis and Multaq, they understand that they will need to follow-up with Dr. Villeda in approximately 3 to 4 weeks and anticipate that she will be able to discharge home tomorrow morning.    Objective     Physical Exam  Vitals reviewed.   Constitutional:       General: She is not in acute distress.     Appearance: Normal appearance. She is not ill-appearing.   HENT:      Head: Normocephalic and atraumatic.      Nose: Nose normal.      Mouth/Throat:      Mouth: Mucous membranes are moist.   Eyes:      Extraocular Movements: Extraocular movements intact.      Pupils: Pupils are equal, round, and reactive to light.   Cardiovascular:      Rate and Rhythm: normal rate present today. Rhythm regular.      Pulses: Normal pulses.      Heart sounds: Normal heart sounds. No murmur heard.     No friction rub. No gallop.   Pulmonary:      Effort: Pulmonary  "effort is normal. No respiratory distress.      Breath sounds: Rhonchi (Scattered and diffuse), although improved from prior day exam. No wheezing or rales.   Abdominal:      General: Abdomen is flat. Bowel sounds are normal. There is no distension.      Palpations: Abdomen is soft. There is no mass.      Tenderness: There is no abdominal tenderness. There is no guarding or rebound.   Musculoskeletal:         General: No swelling, tenderness or signs of injury.      Right lower leg: No edema.      Left lower leg: No edema.   Skin:     General: Skin is warm and dry.      Coloration: Skin is not jaundiced or pale.      Findings: No bruising, erythema, lesion or rash.   Neurological:      General: No focal deficit present.      Mental Status: She is alert and oriented to person, place, and time. Mental status is at baseline.      Cranial Nerves: No cranial nerve deficit.      Motor: No weakness.   Psychiatric:         Mood and Affect: Mood normal.         Behavior: Behavior normal.         Thought Content: Thought content normal.         Judgment: Judgment normal.     Last Recorded Vitals  Blood pressure 138/65, pulse 78, temperature 36 °C (96.8 °F), temperature source Temporal, resp. rate 18, height 1.499 m (4' 11\"), weight 53.5 kg (118 lb), SpO2 100%.  Intake/Output last 3 Shifts:  I/O last 3 completed shifts:  In: 480 (9 mL/kg) [P.O.:480]  Out: 350 (6.5 mL/kg) [Urine:350 (0.2 mL/kg/hr)]  Weight: 53.5 kg     Relevant Results  Scheduled medications  allopurinol, 100 mg, oral, Daily  amLODIPine, 5 mg, oral, Daily  apixaban, 2.5 mg, oral, q12h  aspirin, 81 mg, oral, Daily  budesonide, 0.5 mg, nebulization, BID  calcitriol, 0.25 mcg, oral, Daily  dronedarone, 400 mg, oral, BID  formoterol, 20 mcg, nebulization, BID  gabapentin, 100 mg, oral, Nightly  ipratropium-albuteroL, 3 mL, nebulization, TID  metoprolol succinate XL, 50 mg, oral, Nightly  oseltamivir, 30 mg, oral, BID  oxygen, , inhalation, Continuous - " Inhalation  pantoprazole, 40 mg, oral, Daily  perflutren lipid microspheres, 0.5-10 mL of dilution, intravenous, Once in imaging  perflutren lipid microspheres, 0.5-10 mL of dilution, intravenous, Once in imaging  perflutren protein A microsphere, 0.5 mL, intravenous, Once in imaging  perflutren protein A microsphere, 0.5 mL, intravenous, Once in imaging  [Held by provider] potassium chloride CR, 20 mEq, oral, Daily  rosuvastatin, 5 mg, oral, Nightly  sulfur hexafluoride microsphr, 2 mL, intravenous, Once in imaging  sulfur hexafluoride microsphr, 2 mL, intravenous, Once in imaging  tiZANidine, 2 mg, oral, Nightly      Continuous medications     PRN medications  PRN medications: acetaminophen **OR** acetaminophen **OR** acetaminophen, benzocaine-menthol, dicyclomine, guaiFENesin, ipratropium-albuteroL, ondansetron **OR** [DISCONTINUED] ondansetron, polyethylene glycol    Results for orders placed or performed during the hospital encounter of 02/07/25 (from the past 24 hours)   Electrocardiogram, 12-lead PRN ACS symptoms   Result Value Ref Range    Ventricular Rate 76 BPM    Atrial Rate 76 BPM    CT Interval 136 ms    QRS Duration 84 ms    QT Interval 404 ms    QTC Calculation(Bazett) 454 ms    P Axis 65 degrees    R Axis 36 degrees    T Axis 37 degrees    QRS Count 12 beats    Q Onset 219 ms    P Onset 151 ms    P Offset 206 ms    T Offset 421 ms    QTC Fredericia 436 ms   ECG 12 lead   Result Value Ref Range    Ventricular Rate 119 BPM    Atrial Rate 163 BPM    QRS Duration 76 ms    QT Interval 298 ms    QTC Calculation(Bazett) 419 ms    R Axis 161 degrees    T Axis 171 degrees    QRS Count 20 beats    Q Onset 228 ms    T Offset 377 ms    QTC Fredericia 374 ms       ECG 12 lead    Result Date: 2/8/2025  Atrial fibrillation with rapid ventricular response ST & T wave abnormality, consider inferior ischemia or digitalis effect When compared with ECG of 07-FEB-2025 09:07, Atrial fibrillation has replaced Sinus rhythm  Vent. rate has increased BY  89 BPM secondary rate related ST t changes Reconfirmed by Prabhjot Trejo ([id number]) on 2/8/2025 2:32:40 PM    Electrocardiogram, 12-lead PRN ACS symptoms    Result Date: 2/8/2025  Sinus rhythm with marked sinus arrhythmia ST abnormality, possible digitalis effect When compared with ECG of 25-SEP-2023 09:50, Premature atrial complexes are no longer Present Reconfirmed by Prabhjot Trejo ([id number]) on 2/8/2025 2:08:20 PM    ECG 12 lead    Result Date: 2/7/2025  Sinus rhythm with marked sinus arrhythmia ST abnormality, possible digitalis effect When compared with ECG of 25-SEP-2023 09:50, Premature atrial complexes are no longer Present Reconfirmed by Prabhjot Trejo ([id number]) on 2/7/2025 5:10:57 PM    XR chest 1 view    Result Date: 2/7/2025  STUDY: Chest Radiograph;  2/7/2025 9:51 AM INDICATION: Shortness of breath.  Chest pain. COMPARISON: None Available. ACCESSION NUMBER(S): [id number] ORDERING CLINICIAN: KENZIE ALFORD TECHNIQUE:  Frontal chest was obtained at 09:50 hours. FINDINGS: CARDIOMEDIASTINAL SILHOUETTE: Cardiomediastinal silhouette is normal in size and configuration.  LUNGS: Lungs are clear.  Emphysematous changes noted.  ABDOMEN: No remarkable upper abdominal findings.  BONES: No acute osseous changes.    No acute process. Signed by Manish Verduzco MD       Assessment/Plan   This very pleasant 84-year-old with a known history of GERD and hypertension who presents to the hospital with ULI that is since resolved in the context of positive influenza A PCR, she is improving from a respiratory standpoint on Tamiflu, requiring minimal oxygen, course complicated by atrial fibrillation with rapid ventricular response that has broken back to normal sinus rhythm with amiodarone bolus, maintained on metoprolol, Multaq and started on Eliquis which she has tolerating; continue to work on incentive spirometer and weaning oxygen as tolerated, anticipate discharge to home tomorrow, plan of care  discussed with son, Jose, via speaker phone during today's rounding.  Assessment & Plan  Influenza A  -Continue current level of care on inpatient, continue with telemetry  -Continue Tamiflu  -oxygen supplementation, incentive spirometer, flutter valve device  -clinically improved  -PT OT appreciated  Hypoxia  -stable on oxygen supplementation, wean as tolerated, incentive spirometer  -prn duoneb therapy  Paroxysmal atrial fibrillation (Multi)  -Remains in NSR today  -continue with eliquis, metoprolol, Multaq  -Follow-up with cardiology in 3 weeks  ULI (acute kidney injury) (CMS-HCC)  -Resolved    Diet: 2 to 3 g sodium  VTE PPx: Eliquis  Code: Full         I spent 35 minutes in the professional and overall care of this patient.    Tan Mackenzie MD

## 2025-02-10 NOTE — CARE PLAN
The patient's goals for the shift include      The clinical goals for the shift include Pt will remain HDS this shift    Goals progressing, safety maintained. Pt receiving Tamiflu and aerosol treatments. NSR on tele this night.

## 2025-02-10 NOTE — CARE PLAN
The patient's goals for the shift include    Problem: Pain - Adult  Goal: Verbalizes/displays adequate comfort level or baseline comfort level  Outcome: Progressing     Problem: Safety - Adult  Goal: Free from fall injury  Outcome: Progressing     Problem: Discharge Planning  Goal: Discharge to home or other facility with appropriate resources  Outcome: Progressing     Problem: Chronic Conditions and Co-morbidities  Goal: Patient's chronic conditions and co-morbidity symptoms are monitored and maintained or improved  Outcome: Progressing     Problem: Nutrition  Goal: Nutrient intake appropriate for maintaining nutritional needs  Outcome: Progressing     Problem: Respiratory  Goal: Clear secretions with interventions this shift  Outcome: Progressing  Goal: Minimize anxiety/maximize coping throughout shift  Outcome: Progressing  Goal: Minimal/no exertional discomfort or dyspnea this shift  Outcome: Progressing  Goal: No signs of respiratory distress (eg. Use of accessory muscles. Peds grunting)  Outcome: Progressing  Goal: Verbalize decreased shortness of breath this shift  Outcome: Progressing  Goal: Increase self care and/or family involvement in next 24 hours  Outcome: Progressing       The clinical goals for the shift include pt will remain free of respiratory distress this shift

## 2025-02-10 NOTE — ASSESSMENT & PLAN NOTE
-Remains in NSR today  -continue with eliquis, metoprolol, Multaq  -Follow-up with cardiology in 3 weeks

## 2025-02-10 NOTE — PROGRESS NOTES
Occupational Therapy    Occupational Therapy    Evaluation    Patient Name: Norma Villasenor  MRN: [id number]  Today's Date: 2/10/2025  Time Calculation  Start Time: 1145  Stop Time: 1200  Time Calculation (min): 15 min  3126/3126-A    Assessment  IP OT Assessment  OT Assessment: Pt pleasant and cooperative. Pt agrees she is close to baseline level of function, which is independent. Pt may benefit from home with [facility] OT to ensure safety and independence with ADL's  Prognosis: Good  End of Session Communication: Bedside nurse  End of Session Patient Position: Up in chair, Alarm on    Plan:  Treatment Interventions: ADL retraining, Functional transfer training, UE strengthening/ROM, Endurance training, Neuromuscular reeducation  OT Frequency: 2 times per week  OT Discharge Recommendations: Low intensity level of continued care ([facility] OT)  OT Recommended Transfer Status: Stand by assist  OT - OK to Discharge: Yes (to next level of care)    Subjective     Current Problem:  1. Chest pain, unspecified type        2. ULI (acute kidney injury) (CMS-HCC)        3. Atrial fibrillation, unspecified type (Multi)  CANCELED: Transthoracic Echo (TTE) Limited    CANCELED: Transthoracic Echo (TTE) Limited      4. Abnormal electrocardiogram (ECG) (EKG)  Transthoracic Echo (TTE) Complete    Transthoracic Echo (TTE) Complete    CANCELED: Transthoracic Echo (TTE) Limited    CANCELED: Transthoracic Echo (TTE) Limited          General:  General  Reason for Referral: ADL's  Referred By: Tan Mackenzie MD  Past Medical History Relevant to Rehab: To hospital with diarrhea and SOB. Pt found to be flu positive. 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    Precautions:  Hearing/Visual Limitations: [facility]  Medical Precautions: Fall precautions, Infection precautions (droplet)  Precautions Comment: bed/chair alarm    Vital Signs:  SpO2: 95 % (97% on RA at EOB, 89% on RA with ambulation. Redonned 2L O2 and pt recovered to 96% on 2L. RN informed)    Pain:  Pain Assessment  Pain Assessment: 0-10  0-10 (Numeric) Pain Score: 0 - No pain    Objective     Cognition:  Overall Cognitive Status: Within Functional Limits  Orientation Level: Oriented X4     Home Living:  Pt lives in a split level house with her daughter-in-law and 2 grandchildren. Pt has 2-3 TARA, 8 stairs up to the 2nd floor, and 4 down to lower level (both with rails). Pt has a 1/2 bath on the lower level, and full bathroom and bedroom on 2nd floor. Pt uses all levels of the house.    Prior Function:  Pt was independent with ADL's and IADL's. Pt has a tub/shower with no DME. Pt does not drive, but was able to complete shopping. Pt was completing all household tasks. Pt was not using DME with ambulation in the house, but owns a cane for outdoors. Pt has not ahd any recent falls.     ADL:  Eating Assistance: Independent  Grooming Assistance: Independent  Bathing Assistance: Stand by  UE Dressing Assistance: Independent  LE Dressing Assistance: Stand by  Toileting Assistance with Device: Independent    Activity Tolerance:  Endurance: Tolerates 10 - 20 min exercise with multiple rests    Bed Mobility/Transfers:   Supine-sit: independent    Sit-stand: SBA  Stand-sit: SBA    Ambulation/Gait Training:  Pt ambulated in room with SBA and no DME     Sitting Balance:  Static Sitting Balance  Static Sitting-Comment/Number of Minutes: good  Dynamic Sitting Balance  Dynamic Sitting-Comments: good    Standing Balance:  Static Standing Balance  Static Standing-Comment/Number of Minutes: good  Dynamic Standing Balance  Dynamic Standing-Comments: fair+    Sensation:  Light Touch: No apparent  deficits    Strength:  Strength Comments: B UE's WFL    Extremities: RUE   RUE : Within Functional Limits and LUE   LUE: Within Functional Limits    Outcome Measures: [facility] Daily Activity  Putting on and taking off regular lower body clothing: A little  Bathing (including washing, rinsing, drying): A little  Putting on and taking off regular upper body clothing: None  Toileting, which includes using toilet, bedpan or urinal: None  Taking care of personal grooming such as brushing teeth: None  Eating Meals: None  Daily Activity - Total Score: 22    EDUCATION:  Education  Individual(s) Educated: Patient  Education Provided:  (safety)  Education Documentation  Body Mechanics, taught by Sonali Miller OT at 2/10/2025  4:13 PM.  Learner: Patient  Readiness: Acceptance  Method: Explanation  Response: Verbalizes Understanding    Precautions, taught by Sonali Miller OT at 2/10/2025  4:13 PM.  Learner: Patient  Readiness: Acceptance  Method: Explanation  Response: Verbalizes Understanding    Education Comments  No comments found.        Goals:   Encounter Problems       Encounter Problems (Active)       OT Goals       OT Goal 1 (Progressing)       Start:  02/10/25    Expected End:  02/24/25       Pt with complete all transfers safely independently            OT Goal 2 (Progressing)       Start:  02/10/25    Expected End:  02/24/25       Pt will complete LB dressing independently safely           OT Goal 3 (Progressing)       Start:  02/10/25    Expected End:  02/24/25       Pt will complete grooming ADL's independently with good stand balance

## 2025-02-10 NOTE — PROGRESS NOTES
"  Patient: Norma Villasenor  : 1940  MRN: [id number]    Today's Date: 02/10/25  Hospital Day: #2    Primary Cardiologist:    ASSESSMENT/PLAN:  Paroxysmal atrial fibrillation: Patient remains in normal sinus rhythm.  Tolerating Multaq well.  EKG normal.  Continue oral anticoagulation.  Echocardiogram is unremarkable.  Disp: RTO with me in 3-4 weeks, cardiology will sign off.      SUBJECTIVE:  Patient not seen.  Case discussed with hospitalist and RN.  EKG reviewed.    OBJECTIVE:  VITALS: /60 (BP Location: Left arm, Patient Position: Lying)   Pulse 66   Temp 36.2 °C (97.2 °F) (Temporal)   Resp 17   Ht 1.499 m (4' 11\")   Wt 53.5 kg (118 lb)   SpO2 95%   BMI 23.83 kg/m²       Intake/Output Summary (Last 24 hours) at 2/10/2025 1004  Last data filed at 2025 1500  Gross per 24 hour   Intake 480 ml   Output 350 ml   Net 130 ml       Physical Exam     Meds:Scheduled medications  allopurinol, 100 mg, oral, Daily  amLODIPine, 5 mg, oral, Daily  apixaban, 2.5 mg, oral, q12h  aspirin, 81 mg, oral, Daily  budesonide, 0.5 mg, nebulization, BID  calcitriol, 0.25 mcg, oral, Daily  dronedarone, 400 mg, oral, BID  formoterol, 20 mcg, nebulization, BID  gabapentin, 100 mg, oral, Nightly  ipratropium-albuteroL, 3 mL, nebulization, TID  metoprolol succinate XL, 50 mg, oral, Nightly  oseltamivir, 30 mg, oral, Daily  oxygen, , inhalation, Continuous - Inhalation  pantoprazole, 40 mg, oral, Daily  perflutren lipid microspheres, 0.5-10 mL of dilution, intravenous, Once in imaging  perflutren lipid microspheres, 0.5-10 mL of dilution, intravenous, Once in imaging  perflutren protein A microsphere, 0.5 mL, intravenous, Once in imaging  perflutren protein A microsphere, 0.5 mL, intravenous, Once in imaging  [Held by provider] potassium chloride CR, 20 mEq, oral, Daily  rosuvastatin, 5 mg, oral, Nightly  sulfur hexafluoride microsphr, 2 mL, intravenous, Once in imaging  sulfur hexafluoride microsphr, 2 mL, intravenous, Once " in imaging  tiZANidine, 2 mg, oral, Nightly      Continuous medications     PRN medications  PRN medications: acetaminophen **OR** acetaminophen **OR** acetaminophen, benzocaine-menthol, dicyclomine, guaiFENesin, ipratropium-albuteroL, ondansetron **OR** [DISCONTINUED] ondansetron, polyethylene glycol    Infusions:     DIAGNOSTIC DATA:  Results for orders placed or performed during the hospital encounter of 02/07/25 (from the past 24 hours)   ECG 12 lead   Result Value Ref Range    Ventricular Rate 95 BPM    Atrial Rate 52 BPM    TX Interval 132 ms    QRS Duration 86 ms    QT Interval 376 ms    QTC Calculation(Bazett) 472 ms    P Axis 49 degrees    R Axis 41 degrees    T Axis 47 degrees    QRS Count 15 beats    Q Onset 219 ms    P Onset 153 ms    P Offset 203 ms    T Offset 407 ms    QTC Fredericia 438 ms   Transthoracic Echo (TTE) Complete   Result Value Ref Range    AV pk karla 1.39 m/s    LV Biplane EF 61 %    LVOT diam 1.90 cm    MV E/A ratio 0.72     Tricuspid annular plane systolic excursion 1.7 cm    LV EF 63 %    RV free wall pk S' 19.60 cm/s    RVSP 26.4 mmHg    LVIDd 3.80 cm    Aortic Valve Area by Continuity of Peak Velocity 2.02 cm2    AV pk grad 8 mmHg    LV A4C EF 56.1    ECG 12 lead   Result Value Ref Range    Ventricular Rate 119 BPM    Atrial Rate 163 BPM    QRS Duration 76 ms    QT Interval 298 ms    QTC Calculation(Bazett) 419 ms    R Axis 161 degrees    T Axis 171 degrees    QRS Count 20 beats    Q Onset 228 ms    T Offset 377 ms    QTC Fredericia 374 ms        Results for orders placed during the hospital encounter of 02/07/25    Transthoracic Echo (TTE) Complete    Narrative  [facility]  [id number] [facility] [address]  Tel [phone] Fax [phone]    TRANSTHORACIC ECHOCARDIOGRAM REPORT    Patient Name:       ANNIA Wyatt Physician:    [id number] Lexa Villeda MD  Study Date:         2/9/2025              Ordering Provider:    [id number] RICARDO MARCUS  MRN/PID:             [id number]              Fellow:  Accession#:         [id number]          Nurse:  Date of Birth/Age:  1940 / 84 years Sonographer:          Figueroa Melo RDCS  Gender Assigned at  F                     Additional Staff:  Birth:  Height:             149.00 cm             Admit Date:  Weight:             53.00 kg              Admission Status:  BSA / BMI:          1.46 m2 / 23.87 kg/m2 Department Location:  Blood Pressure: 121 /60 mmHg    Study Type:    TRANSTHORACIC ECHO (TTE) COMPLETE  Diagnosis/ICD: Unspecified atrial fibrillation-I48.91  CPT Codes:     Echo Complete w Full [id number]  Study Detail: The following Echo studies were performed: 2D, Doppler, M-Mode and  color flow.      PHYSICIAN INTERPRETATION:  Left Ventricle: The left ventricular systolic function is normal, with a visually estimated ejection fraction of 60-65%. There is mild concentric left ventricular hypertrophy. There are no regional wall motion abnormalities. The left ventricular cavity size is normal. There is mild increased septal and mildly increased posterior left ventricular wall thickness. There is left ventricular concentric remodeling. Spectral Doppler shows a Grade I (impaired relaxation pattern) of left ventricular diastolic filling with normal left atrial filling pressure.  Left Atrium: The left atrial size is moderate to severely dilated.  Right Ventricle: The right ventricle is normal in size. There is normal right ventricular global systolic function.  Right Atrium: The right atrial size is normal.  Aortic Valve: The aortic valve is trileaflet. There is minimal aortic valve cusp calcification. There is evidence of mildly elevated transaortic gradients consistent with sclerosis of the aortic valve.  There is no evidence of aortic valve regurgitation. The peak instantaneous gradient of the aortic valve is 8 mmHg.  Mitral Valve: The mitral valve is mildly thickened. There is mild to moderate mitral annular  calcification. There is trace mitral valve regurgitation.  Tricuspid Valve: The tricuspid valve is structurally normal. There is trace tricuspid regurgitation. The Doppler estimated RVSP is within normal limits at 26.4 mmHg.  Pulmonic Valve: The pulmonic valve is structurally normal. There is no indication of pulmonic valve regurgitation.  Pericardium: No pericardial effusion noted.  Aorta: The aortic root is normal.  In comparison to the previous echocardiogram(s): There are no prior studies on this patient for comparison purposes.      CONCLUSIONS:  1. The left ventricular systolic function is normal, with a visually estimated ejection fraction of 60-65%.  2. Spectral Doppler shows a Grade I (impaired relaxation pattern) of left ventricular diastolic filling with normal left atrial filling pressure.  3. The left atrial size is moderate to severely dilated.  4. Right ventricular systolic pressure is within normal limits.  5. Aortic valve sclerosis.    QUANTITATIVE DATA SUMMARY:    2D MEASUREMENTS:             Normal Ranges:  LAs:             4.20 cm     (2.7-4.0cm)  IVSd:            1.00 cm     (0.6-1.1cm)  LVPWd:           1.10 cm     (0.6-1.1cm)  LVIDd:           3.80 cm     (3.9-5.9cm)  LV Mass Index:   86.1 g/m2  LVEDV Index:     33.39 ml/m2      LEFT ATRIUM:                Normal Ranges:  LA Volume Index: 27.0 ml/m2      M-MODE MEASUREMENTS:         Normal Ranges:  Ao Root:             2.40 cm (2.0-3.7cm)  AoV Exc:             1.60 cm (1.5-2.5cm)      AORTA MEASUREMENTS:         Normal Ranges:  AoV Exc:            1.60 cm (1.5-2.5cm)  Asc Ao, d:          2.80 cm (2.1-3.4cm)      LV SYSTOLIC FUNCTION:  Normal Ranges:  EF-A4C View:    56 % (>=55%)  EF-A2C View:    68 %  EF-Biplane:     61 %  EF-Visual:      63 %  LV EF Reported: 63 %      LV DIASTOLIC FUNCTION:           Normal Ranges:  MV Peak E:             0.74 m/s  (0.7-1.2 m/s)  MV Peak A:             1.03 m/s  (0.42-0.7 m/s)  E/A Ratio:             0.72       (1.0-2.2)  MV e'                  0.063 m/s (>8.0)  MV lateral e'          0.06 m/s  MV medial e'           0.07 m/s  E/e' Ratio:            11.76     (<8.0)      MITRAL VALVE:          Normal Ranges:  MV DT:        261 msec (150-240msec)      AORTIC VALVE:           Normal Ranges:  AoV Vmax:      1.39 m/s (<=1.7m/s)  AoV Peak P.7 mmHg (<20mmHg)  LVOT Max Norm:  0.99 m/s (<=1.1m/s)  LVOT Diameter: 1.90 cm  (1.8-2.4cm)  AoV Area,Vmax: 2.02 cm2 (2.5-4.5cm2)      RIGHT VENTRICLE:  RV Basal 3.65 cm  RV Mid   2.84 cm  RV Major 5.1 cm  TAPSE:   17.2 mm  RV s'    0.20 m/s      TRICUSPID VALVE/RVSP:          Normal Ranges:  Peak TR Velocity:     2.42 m/s  RV Syst Pressure:     26 mmHg  (< 30mmHg)      [id number] Lexa Villeda MD  Electronically signed on 2/10/2025 at 8:13:52 AM        ** Final **       No results found for this or any previous visit from the past 365 days.        Lexa Villeda MD

## 2025-02-11 ENCOUNTER — PHARMACY VISIT (OUTPATIENT)
Dept: PHARMACY | Facility: CLINIC | Age: 85
End: 2025-02-11
Payer: COMMERCIAL

## 2025-02-11 ENCOUNTER — APPOINTMENT (OUTPATIENT)
Dept: CARDIOLOGY | Facility: HOSPITAL | Age: 85
DRG: 193 | End: 2025-02-11
Payer: MEDICARE

## 2025-02-11 VITALS
DIASTOLIC BLOOD PRESSURE: 70 MMHG | TEMPERATURE: 97.2 F | HEIGHT: 59 IN | OXYGEN SATURATION: 98 % | WEIGHT: 118 LBS | BODY MASS INDEX: 23.79 KG/M2 | HEART RATE: 87 BPM | RESPIRATION RATE: 20 BRPM | SYSTOLIC BLOOD PRESSURE: 122 MMHG

## 2025-02-11 LAB
ANION GAP SERPL CALC-SCNC: 9 MMOL/L (ref 10–20)
ATRIAL RATE: 76 BPM
BUN SERPL-MCNC: 24 MG/DL (ref 6–23)
CALCIUM SERPL-MCNC: 9.4 MG/DL (ref 8.6–10.3)
CHLORIDE SERPL-SCNC: 106 MMOL/L (ref 98–107)
CO2 SERPL-SCNC: 28 MMOL/L (ref 21–32)
CREAT SERPL-MCNC: 1.24 MG/DL (ref 0.5–1.05)
EGFRCR SERPLBLD CKD-EPI 2021: 43 ML/MIN/1.73M*2
ERYTHROCYTE [DISTWIDTH] IN BLOOD BY AUTOMATED COUNT: 12.5 % (ref 11.5–14.5)
GLUCOSE SERPL-MCNC: 101 MG/DL (ref 74–99)
HCT VFR BLD AUTO: 33.5 % (ref 36–46)
HGB BLD-MCNC: 10.5 G/DL (ref 12–16)
MAGNESIUM SERPL-MCNC: 2.06 MG/DL (ref 1.6–2.4)
MCH RBC QN AUTO: 28.8 PG (ref 26–34)
MCHC RBC AUTO-ENTMCNC: 31.3 G/DL (ref 32–36)
MCV RBC AUTO: 92 FL (ref 80–100)
NRBC BLD-RTO: 0 /100 WBCS (ref 0–0)
P AXIS: 65 DEGREES
P OFFSET: 206 MS
P ONSET: 151 MS
PLATELET # BLD AUTO: 243 X10*3/UL (ref 150–450)
POTASSIUM SERPL-SCNC: 4 MMOL/L (ref 3.5–5.3)
PR INTERVAL: 136 MS
Q ONSET: 219 MS
QRS COUNT: 12 BEATS
QRS DURATION: 84 MS
QT INTERVAL: 404 MS
QTC CALCULATION(BAZETT): 454 MS
QTC FREDERICIA: 436 MS
R AXIS: 36 DEGREES
RBC # BLD AUTO: 3.65 X10*6/UL (ref 4–5.2)
SODIUM SERPL-SCNC: 139 MMOL/L (ref 136–145)
T AXIS: 37 DEGREES
T OFFSET: 421 MS
VENTRICULAR RATE: 76 BPM
WBC # BLD AUTO: 7.8 X10*3/UL (ref 4.4–11.3)

## 2025-02-11 PROCEDURE — RXMED WILLOW AMBULATORY MEDICATION CHARGE

## 2025-02-11 PROCEDURE — 85027 COMPLETE CBC AUTOMATED: CPT | Performed by: INTERNAL MEDICINE

## 2025-02-11 PROCEDURE — 94640 AIRWAY INHALATION TREATMENT: CPT

## 2025-02-11 PROCEDURE — 83735 ASSAY OF MAGNESIUM: CPT | Performed by: INTERNAL MEDICINE

## 2025-02-11 PROCEDURE — 99239 HOSP IP/OBS DSCHRG MGMT >30: CPT | Performed by: INTERNAL MEDICINE

## 2025-02-11 PROCEDURE — 2500000001 HC RX 250 WO HCPCS SELF ADMINISTERED DRUGS (ALT 637 FOR MEDICARE OP): Performed by: INTERNAL MEDICINE

## 2025-02-11 PROCEDURE — 36415 COLL VENOUS BLD VENIPUNCTURE: CPT | Performed by: INTERNAL MEDICINE

## 2025-02-11 PROCEDURE — 2500000002 HC RX 250 W HCPCS SELF ADMINISTERED DRUGS (ALT 637 FOR MEDICARE OP, ALT 636 FOR OP/ED): Performed by: INTERNAL MEDICINE

## 2025-02-11 PROCEDURE — 82374 ASSAY BLOOD CARBON DIOXIDE: CPT | Performed by: INTERNAL MEDICINE

## 2025-02-11 PROCEDURE — 2500000004 HC RX 250 GENERAL PHARMACY W/ HCPCS (ALT 636 FOR OP/ED): Performed by: INTERNAL MEDICINE

## 2025-02-11 PROCEDURE — 2500000005 HC RX 250 GENERAL PHARMACY W/O HCPCS: Performed by: INTERNAL MEDICINE

## 2025-02-11 PROCEDURE — 2500000002 HC RX 250 W HCPCS SELF ADMINISTERED DRUGS (ALT 637 FOR MEDICARE OP, ALT 636 FOR OP/ED)

## 2025-02-11 PROCEDURE — 93005 ELECTROCARDIOGRAM TRACING: CPT

## 2025-02-11 RX ORDER — OSELTAMIVIR PHOSPHATE 30 MG/1
30 CAPSULE ORAL 2 TIMES DAILY
Qty: 1 CAPSULE | Refills: 0 | Status: SHIPPED | OUTPATIENT
Start: 2025-02-11 | End: 2025-02-12

## 2025-02-11 RX ORDER — ASPIRIN 81 MG/1
81 TABLET ORAL DAILY
Qty: 30 TABLET | Refills: 0 | Status: SHIPPED | OUTPATIENT
Start: 2025-02-12 | End: 2025-03-14

## 2025-02-11 RX ORDER — GUAIFENESIN 600 MG/1
600 TABLET, EXTENDED RELEASE ORAL EVERY 12 HOURS PRN
Qty: 28 TABLET | Refills: 0 | Status: SHIPPED | OUTPATIENT
Start: 2025-02-11 | End: 2025-02-25

## 2025-02-11 RX ADMIN — Medication 2 L/MIN: at 08:21

## 2025-02-11 RX ADMIN — PANTOPRAZOLE SODIUM 40 MG: 40 TABLET, DELAYED RELEASE ORAL at 10:21

## 2025-02-11 RX ADMIN — FORMOTEROL FUMARATE 20 MCG: 20 SOLUTION RESPIRATORY (INHALATION) at 08:18

## 2025-02-11 RX ADMIN — BUDESONIDE 0.5 MG: 0.5 INHALANT RESPIRATORY (INHALATION) at 08:17

## 2025-02-11 RX ADMIN — ALLOPURINOL 100 MG: 100 TABLET ORAL at 10:20

## 2025-02-11 RX ADMIN — CALCITRIOL CAPSULES 0.25 MCG 0.25 MCG: 0.25 CAPSULE ORAL at 10:21

## 2025-02-11 RX ADMIN — OSELTAMAVIR PHOSPHATE 30 MG: 30 CAPSULE ORAL at 10:22

## 2025-02-11 RX ADMIN — IPRATROPIUM BROMIDE AND ALBUTEROL SULFATE 3 ML: 2.5; .5 SOLUTION RESPIRATORY (INHALATION) at 08:16

## 2025-02-11 RX ADMIN — AMLODIPINE BESYLATE 5 MG: 5 TABLET ORAL at 10:22

## 2025-02-11 RX ADMIN — DRONEDARONE 400 MG: 400 TABLET, FILM COATED ORAL at 10:20

## 2025-02-11 RX ADMIN — APIXABAN 2.5 MG: 2.5 TABLET, FILM COATED ORAL at 10:22

## 2025-02-11 RX ADMIN — ACETAMINOPHEN 650 MG: 325 TABLET ORAL at 10:22

## 2025-02-11 RX ADMIN — ASPIRIN 81 MG: 81 TABLET, COATED ORAL at 10:20

## 2025-02-11 RX ADMIN — GUAIFENESIN 600 MG: 600 TABLET ORAL at 10:21

## 2025-02-11 ASSESSMENT — COGNITIVE AND FUNCTIONAL STATUS - GENERAL
PERSONAL GROOMING: A LITTLE
TOILETING: A LITTLE
HELP NEEDED FOR BATHING: A LITTLE
DRESSING REGULAR LOWER BODY CLOTHING: A LITTLE
WALKING IN HOSPITAL ROOM: A LITTLE
DRESSING REGULAR UPPER BODY CLOTHING: A LITTLE
CLIMB 3 TO 5 STEPS WITH RAILING: A LITTLE
MOBILITY SCORE: 22
DAILY ACTIVITIY SCORE: 19

## 2025-02-11 ASSESSMENT — ACTIVITIES OF DAILY LIVING (ADL): LACK_OF_TRANSPORTATION: NO

## 2025-02-11 ASSESSMENT — PAIN SCALES - GENERAL
PAINLEVEL_OUTOF10: 1
PAINLEVEL_OUTOF10: 3

## 2025-02-11 ASSESSMENT — PAIN - FUNCTIONAL ASSESSMENT
PAIN_FUNCTIONAL_ASSESSMENT: 0-10
PAIN_FUNCTIONAL_ASSESSMENT: 0-10

## 2025-02-11 ASSESSMENT — PAIN DESCRIPTION - DESCRIPTORS
DESCRIPTORS: ACHING
DESCRIPTORS: ACHING

## 2025-02-11 ASSESSMENT — PAIN DESCRIPTION - LOCATION: LOCATION: GENERALIZED

## 2025-02-11 NOTE — DISCHARGE INSTRUCTIONS
"It was a pleasure to meet you in the hospital  You were diagnosed as having influenza A  This is commonly referred to as \"the flu\" you were treated with a medication called Tamiflu while you are in the hospital, you have 3 additional doses to take  You also had a condition called atrial fibrillation while you are in the hospital, this was related to having the flu; given the increased risk of stroke related to having atrial fibrillation, you were started on a medication called Eliquis, this prescription was provided to you, information about this medication is included in this discharge packet  Fortunately your heart has returned back to a normal heart rhythm  You are also started on a medication called dronaderone, or Multaq, that will help keep your heart in the regular rhythm  You should follow-up with Dr. Villeda, the cardiology or heart doctor, that saw you while you are in the hospital, he would like to see you in 3 weeks after discharge  You should also plan on following up with your primary care physician within 2 weeks  "

## 2025-02-11 NOTE — CARE PLAN
The patient's goals for the shift include See care plan    The clinical goals for the shift include See care plan

## 2025-02-11 NOTE — DOCUMENTATION CLARIFICATION NOTE
"    PATIENT:               ANNIA TUCKER  ACCT #:                  [id number]  MRN:                       [id number]  :                       1940  ADMIT DATE:       2025 9:09 AM  DISCH DATE:  RESPONDING PROVIDER #:        [id number]          PROVIDER RESPONSE TEXT:    Type II MI    CDI QUERY TEXT:    Clarification    Instruction:    Based on your assessment of the patient and the clinical information, please provide the requested documentation by clicking on the appropriate radio button and enter any additional information if prompted.    Question: Is there a diagnosis indicative of the patient elevated Troponins and symptoms    When answering this query, please exercise your independent professional judgment. The fact that a question is being asked, does not imply that any particular answer is desired or expected.    The patient's clinical indicators include:  Clinical Information: 85 y/o F admitted with Influenza A and chest pain    Clinical Indicators:    Troponin:  25 09:23: 19  25 10:30: 15    EKG 25 0910: \"Sinus rhythm with marked sinus arrhythmia, ST abnormality, possible digitalis effect\"  EKG 25 0923: \"Atrial fibrillation with rapid ventricular response, ST & T wave abnormality, consider inferior ischemia or digitalis effect\"    ED note  A.Desmond: \"presenting to the ED with chest pain.  Patient states the chest pain began about 1.5 hours ago.  Patient states she also began having shortness of breath around 2 AM.  Patient states she used her inhaler which did improve the chest pain and shortness of breath.  Patient states she still having chest pain and shortness of breath currently however.  Initial troponin was elevated at 19 and repeat troponin was also elevated however decreased to 15.   COVID, RSV swabs are negative.  Influenza swab is positive for influenza A.  CMP shows ULI with BUN to 52, creatinine 1.63, GFR 31.  chest pain, ULI\"    H/P  Dr. Mackenzie: \"influenza " "A, ULI.  high sensitivity troponin 19 -> 15\"    cardiology consult 2/8 Dr. Villeda: \"New onset atrial fibrillation.  She is without chest pain.  Troponin was only minimally elevated consistent with her diagnosis of influenza\"    cardiology PN 2/9 Dr. Villeda: \"Dyspnea: This seems to be mostly pulmonary related\"    MD PN 2/9, 2/10 Dr. Mackenzie: \"Hypoxia-stable on oxygen supplementation, wean as tolerated, incentive spirometer\"    Treatment: trend troponin level, EKG, cardiology consult, supplemental oxygen, PO ASA 81mg daily    Risk Factors: chest pain, SOB, hypoxia, influenza A, New onset atrial fibrillation  Options provided:  -- NSTEMI  -- Type II MI  -- Acute Myocardial Injury  -- Other - I will add my own diagnosis  -- Refer to Clinical Documentation Reviewer    Query created by: Loreta Rico on 2/11/2025 9:31 AM      Electronically signed by:  RICARDO MACKENZIE MD 2/11/2025 11:39 AM          "

## 2025-02-11 NOTE — PROGRESS NOTES
02/11/25 1124   Discharge Planning   Living Arrangements Children   Support Systems Children   Assistance Needed none   Type of Residence Private residence   Home or Post Acute Services None   Expected Discharge Disposition Home   Does the patient need discharge transport arranged? No   Financial Resource Strain   How hard is it for you to pay for the very basics like food, housing, medical care, and heating? Not hard   Housing Stability   In the last 12 months, was there a time when you were not able to pay the mortgage or rent on time? N   At any time in the past 12 months, were you homeless or living in a shelter (including now)? N   Transportation Needs   In the past 12 months, has lack of transportation kept you from medical appointments or from getting medications? no   In the past 12 months, has lack of transportation kept you from meetings, work, or from getting things needed for daily living? No   Patient Choice   Patient / Family choosing to utilize agency / facility established prior to hospitalization No   Stroke Family Assessment   Stroke Family Assessment Needed No   Intensity of Service   Intensity of Service 0-30 min     Follow up with patient at the bedside, confirmed she will return home with the assistance of her son and grandchildren. She uses a cane here in the hospital but at home she uses no assistive device. She is refusing home care, feels her family is all she will need. They are a good support system.

## 2025-02-11 NOTE — DISCHARGE SUMMARY
"Discharge Diagnosis  Influenza A    Issues Requiring Follow-Up  It was a pleasure to meet you in the hospital  You were diagnosed as having influenza A  This is commonly referred to as \"the flu\" you were treated with a medication called Tamiflu while you are in the hospital, you have 3 additional doses to take  You also had a condition called atrial fibrillation while you are in the hospital, this was related to having the flu; given the increased risk of stroke related to having atrial fibrillation, you were started on a medication called Eliquis, this prescription was provided to you, information about this medication is included in this discharge packet  Fortunately your heart has returned back to a normal heart rhythm  You are also started on a medication called dronaderone, or Multaq, that will help keep your heart in the regular rhythm  You should follow-up with Dr. Villeda, the cardiology or heart doctor, that saw you while you are in the hospital, he would like to see you in 3 weeks after discharge  You should also plan on following up with your primary care physician within 2 weeks    Test Results Pending At Discharge  Pending Labs       No current pending labs.            Hospital Course  This very pleasant 84-year-old with a known history of GERD and hypertension who presents to the hospital with ULI that is since resolved in the context of positive influenza A PCR, she was treated with Tamiflu and showed improvement, she initially was on oxygen supplementation but did a good job with an incentive spirometer and was able to wean off of oxygen supplementation by the time she was optimized for discharge from the hospital; her course was complicated by new onset atrial fibrillation with RVR, this did improve initially with fluid support and metoprolol, she was started on anticoagulation with Eliquis and rhythm controlled with Multaq, given that it is suspected to have onset in the context of the influenza A, " will finish the full treatment and she will follow-up with Dr. Villeda, the cardiology consultant who saw her while she was in the hospital; none of her other routine medications were changed or altered.  Her plan of care was discussed with her son, Jose, daily via telephone while she was in the hospital; she was given the above-mentioned discharge instructions and will be discharged with home care services for PT and OT as she makes her recovery.    Greater than 30 minutes was spent facilitating this patients discharge from the hospital which included examining the patient, reconciling medications, and making arrangements for future care.    Tan Mackenzie MD  [facility] [place]  Internal Medicine    This document was generated in whole or in part using the Dragon One medical voice recognition software and there may be some incorrect words/wording, spelling, or punctuation errors that were not corrected prior to finalization in the medical record.    Pertinent Physical Exam At Time of Discharge  Physical Exam  Vitals reviewed.   Constitutional:       General: She is not in acute distress.     Appearance: Normal appearance. She is not ill-appearing.   HENT:      Head: Normocephalic and atraumatic.      Nose: Nose normal.      Mouth/Throat:      Mouth: Mucous membranes are moist.   Eyes:      Extraocular Movements: Extraocular movements intact.      Pupils: Pupils are equal, round, and reactive to light.   Cardiovascular:      Rate and Rhythm: normal rate present today. Rhythm regular.      Pulses: Normal pulses.      Heart sounds: Normal heart sounds. No murmur heard.     No friction rub. No gallop.   Pulmonary:      Effort: Pulmonary effort is normal. No respiratory distress.      Breath sounds: Rhonchi (Scattered and diffuse), although greatly improved from prior day exam. No wheezing or rales.   Abdominal:      General: Abdomen is flat. Bowel sounds are normal. There is no distension.      Palpations:  Abdomen is soft. There is no mass.      Tenderness: There is no abdominal tenderness. There is no guarding or rebound.   Musculoskeletal:         General: No swelling, tenderness or signs of injury.      Right lower leg: No edema.      Left lower leg: No edema.   Skin:     General: Skin is warm and dry.      Coloration: Skin is not jaundiced or pale.      Findings: No bruising, erythema, lesion or rash.   Neurological:      General: No focal deficit present.      Mental Status: She is alert and oriented to person, place, and time. Mental status is at baseline.      Cranial Nerves: No cranial nerve deficit.      Motor: No weakness.   Psychiatric:         Mood and Affect: Mood normal.         Behavior: Behavior normal.         Thought Content: Thought content normal.         Judgment: Judgment normal.     Home Medications     Medication List      ASK your doctor about these medications     allopurinol 100 mg tablet; Commonly known as: Zyloprim   amLODIPine 5 mg tablet; Commonly known as: Norvasc; TAKE 1 TABLET ONE   TIME DAILY   Breo Ellipta 200-25 mcg/dose inhaler; Generic drug: fluticasone   furoate-vilanteroL   calcitriol 0.25 mcg capsule; Commonly known as: Rocaltrol   dicyclomine 10 mg capsule; Commonly known as: Bentyl   gabapentin 100 mg capsule; Commonly known as: Neurontin   meclizine 25 mg tablet; Commonly known as: Antivert   metoprolol succinate XL 50 mg 24 hr tablet; Commonly known as: Toprol-XL   pantoprazole 40 mg EC tablet; Commonly known as: ProtoNix   potassium chloride CR 20 mEq ER tablet; Commonly known as: Klor-Con M20;   Ask about: Which instructions should I use?   rosuvastatin 5 mg tablet; Commonly known as: Crestor   tiZANidine 2 mg tablet; Commonly known as: Zanaflex       Outpatient Follow-Up  No future appointments.    Tan Mackenzie MD

## 2025-02-15 LAB
ATRIAL RATE: 111 BPM
P AXIS: 46 DEGREES
P OFFSET: 178 MS
P ONSET: 125 MS
PR INTERVAL: 152 MS
Q ONSET: 201 MS
QRS COUNT: 18 BEATS
QRS DURATION: 114 MS
QT INTERVAL: 406 MS
QTC CALCULATION(BAZETT): 552 MS
QTC FREDERICIA: 498 MS
R AXIS: 64 DEGREES
T AXIS: 92 DEGREES
T OFFSET: 404 MS
VENTRICULAR RATE: 111 BPM

## 2025-02-19 LAB
ATRIAL RATE: 163 BPM
ATRIAL RATE: 52 BPM
ATRIAL RATE: 76 BPM
ATRIAL RATE: 76 BPM
P AXIS: 49 DEGREES
P AXIS: 65 DEGREES
P AXIS: 65 DEGREES
P OFFSET: 203 MS
P OFFSET: 206 MS
P OFFSET: 206 MS
P ONSET: 151 MS
P ONSET: 151 MS
P ONSET: 153 MS
PR INTERVAL: 132 MS
PR INTERVAL: 136 MS
PR INTERVAL: 136 MS
Q ONSET: 219 MS
Q ONSET: 228 MS
QRS COUNT: 12 BEATS
QRS COUNT: 12 BEATS
QRS COUNT: 15 BEATS
QRS COUNT: 20 BEATS
QRS DURATION: 76 MS
QRS DURATION: 84 MS
QRS DURATION: 84 MS
QRS DURATION: 86 MS
QT INTERVAL: 298 MS
QT INTERVAL: 376 MS
QT INTERVAL: 404 MS
QT INTERVAL: 404 MS
QTC CALCULATION(BAZETT): 419 MS
QTC CALCULATION(BAZETT): 454 MS
QTC CALCULATION(BAZETT): 454 MS
QTC CALCULATION(BAZETT): 472 MS
QTC FREDERICIA: 374 MS
QTC FREDERICIA: 436 MS
QTC FREDERICIA: 436 MS
QTC FREDERICIA: 438 MS
R AXIS: 161 DEGREES
R AXIS: 36 DEGREES
R AXIS: 36 DEGREES
R AXIS: 41 DEGREES
T AXIS: 171 DEGREES
T AXIS: 37 DEGREES
T AXIS: 37 DEGREES
T AXIS: 47 DEGREES
T OFFSET: 377 MS
T OFFSET: 407 MS
T OFFSET: 421 MS
T OFFSET: 421 MS
VENTRICULAR RATE: 119 BPM
VENTRICULAR RATE: 76 BPM
VENTRICULAR RATE: 76 BPM
VENTRICULAR RATE: 95 BPM

## 2025-02-28 ENCOUNTER — HOSPITAL ENCOUNTER (EMERGENCY)
Facility: HOSPITAL | Age: 85
Discharge: HOME | End: 2025-02-28
Attending: EMERGENCY MEDICINE
Payer: MEDICARE

## 2025-02-28 ENCOUNTER — APPOINTMENT (OUTPATIENT)
Dept: CARDIOLOGY | Facility: HOSPITAL | Age: 85
End: 2025-02-28
Payer: MEDICARE

## 2025-02-28 ENCOUNTER — APPOINTMENT (OUTPATIENT)
Dept: RADIOLOGY | Facility: HOSPITAL | Age: 85
End: 2025-02-28
Payer: MEDICARE

## 2025-02-28 VITALS
BODY MASS INDEX: 22.38 KG/M2 | RESPIRATION RATE: 17 BRPM | DIASTOLIC BLOOD PRESSURE: 65 MMHG | TEMPERATURE: 97.7 F | HEIGHT: 59 IN | SYSTOLIC BLOOD PRESSURE: 132 MMHG | OXYGEN SATURATION: 96 % | WEIGHT: 111 LBS | HEART RATE: 61 BPM

## 2025-02-28 DIAGNOSIS — R11.2 NAUSEA AND VOMITING, UNSPECIFIED VOMITING TYPE: ICD-10-CM

## 2025-02-28 DIAGNOSIS — N12 PYELONEPHRITIS: Primary | ICD-10-CM

## 2025-02-28 LAB
ALBUMIN SERPL BCP-MCNC: 4.3 G/DL (ref 3.4–5)
ALP SERPL-CCNC: 48 U/L (ref 33–136)
ALT SERPL W P-5'-P-CCNC: 7 U/L (ref 7–45)
ANION GAP SERPL CALC-SCNC: 16 MMOL/L (ref 10–20)
APPEARANCE UR: ABNORMAL
AST SERPL W P-5'-P-CCNC: 13 U/L (ref 9–39)
BACTERIA #/AREA URNS AUTO: ABNORMAL /HPF
BASOPHILS # BLD AUTO: 0.02 X10*3/UL (ref 0–0.1)
BASOPHILS NFR BLD AUTO: 0.4 %
BILIRUB SERPL-MCNC: 0.4 MG/DL (ref 0–1.2)
BILIRUB UR STRIP.AUTO-MCNC: NEGATIVE MG/DL
BUN SERPL-MCNC: 24 MG/DL (ref 6–23)
CALCIUM SERPL-MCNC: 10.2 MG/DL (ref 8.6–10.3)
CARDIAC TROPONIN I PNL SERPL HS: 12 NG/L (ref 0–13)
CHLORIDE SERPL-SCNC: 104 MMOL/L (ref 98–107)
CO2 SERPL-SCNC: 23 MMOL/L (ref 21–32)
COLOR UR: ABNORMAL
CREAT SERPL-MCNC: 1.09 MG/DL (ref 0.5–1.05)
EGFRCR SERPLBLD CKD-EPI 2021: 51 ML/MIN/1.73M*2
EOSINOPHIL # BLD AUTO: 0.04 X10*3/UL (ref 0–0.4)
EOSINOPHIL NFR BLD AUTO: 0.7 %
ERYTHROCYTE [DISTWIDTH] IN BLOOD BY AUTOMATED COUNT: 13.2 % (ref 11.5–14.5)
FLUAV RNA RESP QL NAA+PROBE: NOT DETECTED
FLUBV RNA RESP QL NAA+PROBE: NOT DETECTED
GLUCOSE SERPL-MCNC: 106 MG/DL (ref 74–99)
GLUCOSE UR STRIP.AUTO-MCNC: NORMAL MG/DL
HCT VFR BLD AUTO: 38 % (ref 36–46)
HGB BLD-MCNC: 12.8 G/DL (ref 12–16)
HOLD SPECIMEN: NORMAL
HYALINE CASTS #/AREA URNS AUTO: ABNORMAL /LPF
IMM GRANULOCYTES # BLD AUTO: 0.02 X10*3/UL (ref 0–0.5)
IMM GRANULOCYTES NFR BLD AUTO: 0.4 % (ref 0–0.9)
KETONES UR STRIP.AUTO-MCNC: ABNORMAL MG/DL
LEUKOCYTE ESTERASE UR QL STRIP.AUTO: ABNORMAL
LYMPHOCYTES # BLD AUTO: 1.43 X10*3/UL (ref 0.8–3)
LYMPHOCYTES NFR BLD AUTO: 25.9 %
MAGNESIUM SERPL-MCNC: 2.05 MG/DL (ref 1.6–2.4)
MCH RBC QN AUTO: 29.6 PG (ref 26–34)
MCHC RBC AUTO-ENTMCNC: 33.7 G/DL (ref 32–36)
MCV RBC AUTO: 88 FL (ref 80–100)
MONOCYTES # BLD AUTO: 0.42 X10*3/UL (ref 0.05–0.8)
MONOCYTES NFR BLD AUTO: 7.6 %
MUCOUS THREADS #/AREA URNS AUTO: ABNORMAL /LPF
NEUTROPHILS # BLD AUTO: 3.59 X10*3/UL (ref 1.6–5.5)
NEUTROPHILS NFR BLD AUTO: 65 %
NITRITE UR QL STRIP.AUTO: ABNORMAL
NRBC BLD-RTO: 0 /100 WBCS (ref 0–0)
PH UR STRIP.AUTO: 5.5 [PH]
PLATELET # BLD AUTO: 339 X10*3/UL (ref 150–450)
POTASSIUM SERPL-SCNC: 3.4 MMOL/L (ref 3.5–5.3)
PROT SERPL-MCNC: 8.1 G/DL (ref 6.4–8.2)
PROT UR STRIP.AUTO-MCNC: ABNORMAL MG/DL
RBC # BLD AUTO: 4.33 X10*6/UL (ref 4–5.2)
RBC # UR STRIP.AUTO: ABNORMAL MG/DL
RBC #/AREA URNS AUTO: ABNORMAL /HPF
SARS-COV-2 RNA RESP QL NAA+PROBE: NOT DETECTED
SODIUM SERPL-SCNC: 140 MMOL/L (ref 136–145)
SP GR UR STRIP.AUTO: 1.01
SQUAMOUS #/AREA URNS AUTO: ABNORMAL /HPF
UROBILINOGEN UR STRIP.AUTO-MCNC: NORMAL MG/DL
WBC # BLD AUTO: 5.5 X10*3/UL (ref 4.4–11.3)
WBC #/AREA URNS AUTO: >50 /HPF

## 2025-02-28 PROCEDURE — 93005 ELECTROCARDIOGRAM TRACING: CPT

## 2025-02-28 PROCEDURE — 2500000002 HC RX 250 W HCPCS SELF ADMINISTERED DRUGS (ALT 637 FOR MEDICARE OP, ALT 636 FOR OP/ED): Mod: MUE | Performed by: EMERGENCY MEDICINE

## 2025-02-28 PROCEDURE — 96365 THER/PROPH/DIAG IV INF INIT: CPT

## 2025-02-28 PROCEDURE — 83735 ASSAY OF MAGNESIUM: CPT | Performed by: EMERGENCY MEDICINE

## 2025-02-28 PROCEDURE — 71045 X-RAY EXAM CHEST 1 VIEW: CPT

## 2025-02-28 PROCEDURE — 96375 TX/PRO/DX INJ NEW DRUG ADDON: CPT

## 2025-02-28 PROCEDURE — 84484 ASSAY OF TROPONIN QUANT: CPT | Performed by: EMERGENCY MEDICINE

## 2025-02-28 PROCEDURE — 71045 X-RAY EXAM CHEST 1 VIEW: CPT | Mod: FOREIGN READ | Performed by: RADIOLOGY

## 2025-02-28 PROCEDURE — 87636 SARSCOV2 & INF A&B AMP PRB: CPT | Performed by: EMERGENCY MEDICINE

## 2025-02-28 PROCEDURE — 80053 COMPREHEN METABOLIC PANEL: CPT | Performed by: EMERGENCY MEDICINE

## 2025-02-28 PROCEDURE — 85025 COMPLETE CBC W/AUTO DIFF WBC: CPT | Performed by: EMERGENCY MEDICINE

## 2025-02-28 PROCEDURE — 96361 HYDRATE IV INFUSION ADD-ON: CPT

## 2025-02-28 PROCEDURE — 99285 EMERGENCY DEPT VISIT HI MDM: CPT | Mod: 25 | Performed by: EMERGENCY MEDICINE

## 2025-02-28 PROCEDURE — 81001 URINALYSIS AUTO W/SCOPE: CPT | Performed by: EMERGENCY MEDICINE

## 2025-02-28 PROCEDURE — 87086 URINE CULTURE/COLONY COUNT: CPT | Mod: STJLAB | Performed by: EMERGENCY MEDICINE

## 2025-02-28 PROCEDURE — 36415 COLL VENOUS BLD VENIPUNCTURE: CPT | Performed by: EMERGENCY MEDICINE

## 2025-02-28 PROCEDURE — 99284 EMERGENCY DEPT VISIT MOD MDM: CPT | Performed by: EMERGENCY MEDICINE

## 2025-02-28 PROCEDURE — 2500000004 HC RX 250 GENERAL PHARMACY W/ HCPCS (ALT 636 FOR OP/ED): Performed by: EMERGENCY MEDICINE

## 2025-02-28 PROCEDURE — 2500000001 HC RX 250 WO HCPCS SELF ADMINISTERED DRUGS (ALT 637 FOR MEDICARE OP): Performed by: EMERGENCY MEDICINE

## 2025-02-28 RX ORDER — SULFAMETHOXAZOLE AND TRIMETHOPRIM 800; 160 MG/1; MG/1
1 TABLET ORAL 2 TIMES DAILY
Qty: 14 TABLET | Refills: 0 | Status: ON HOLD | OUTPATIENT
Start: 2025-02-28 | End: 2025-03-07

## 2025-02-28 RX ORDER — LIDOCAINE HYDROCHLORIDE 20 MG/ML
15 SOLUTION OROPHARYNGEAL ONCE
Status: CANCELLED | OUTPATIENT
Start: 2025-02-28 | End: 2025-02-28

## 2025-02-28 RX ORDER — ALUMINUM HYDROXIDE, MAGNESIUM HYDROXIDE, AND SIMETHICONE 1200; 120; 1200 MG/30ML; MG/30ML; MG/30ML
10 SUSPENSION ORAL ONCE
Status: CANCELLED | OUTPATIENT
Start: 2025-02-28 | End: 2025-02-28

## 2025-02-28 RX ORDER — CEFTRIAXONE 1 G/50ML
1 INJECTION, SOLUTION INTRAVENOUS ONCE
Status: DISCONTINUED | OUTPATIENT
Start: 2025-02-28 | End: 2025-02-28

## 2025-02-28 RX ORDER — POTASSIUM CHLORIDE 20 MEQ/1
40 TABLET, EXTENDED RELEASE ORAL ONCE
Status: COMPLETED | OUTPATIENT
Start: 2025-02-28 | End: 2025-02-28

## 2025-02-28 RX ORDER — POTASSIUM CHLORIDE 1.5 G/1.58G
40 POWDER, FOR SOLUTION ORAL ONCE
Status: DISCONTINUED | OUTPATIENT
Start: 2025-02-28 | End: 2025-02-28

## 2025-02-28 RX ORDER — ONDANSETRON HYDROCHLORIDE 2 MG/ML
4 INJECTION, SOLUTION INTRAVENOUS ONCE
Status: COMPLETED | OUTPATIENT
Start: 2025-02-28 | End: 2025-02-28

## 2025-02-28 RX ORDER — PROCHLORPERAZINE EDISYLATE 5 MG/ML
5 INJECTION INTRAMUSCULAR; INTRAVENOUS ONCE
Status: COMPLETED | OUTPATIENT
Start: 2025-02-28 | End: 2025-02-28

## 2025-02-28 RX ORDER — PROCHLORPERAZINE MALEATE 5 MG
5 TABLET ORAL EVERY 6 HOURS PRN
Qty: 12 TABLET | Refills: 0 | Status: ON HOLD | OUTPATIENT
Start: 2025-02-28 | End: 2025-03-03

## 2025-02-28 RX ADMIN — SODIUM CHLORIDE, POTASSIUM CHLORIDE, SODIUM LACTATE AND CALCIUM CHLORIDE 1000 ML: 600; 310; 30; 20 INJECTION, SOLUTION INTRAVENOUS at 15:55

## 2025-02-28 RX ADMIN — ONDANSETRON 4 MG: 2 INJECTION INTRAMUSCULAR; INTRAVENOUS at 15:55

## 2025-02-28 RX ADMIN — CEFTRIAXONE 2 G: 2 INJECTION, POWDER, FOR SOLUTION INTRAMUSCULAR; INTRAVENOUS at 17:43

## 2025-02-28 RX ADMIN — POTASSIUM CHLORIDE 40 MEQ: 1500 TABLET, EXTENDED RELEASE ORAL at 16:36

## 2025-02-28 RX ADMIN — PROCHLORPERAZINE EDISYLATE 5 MG: 5 INJECTION INTRAMUSCULAR; INTRAVENOUS at 19:21

## 2025-02-28 ASSESSMENT — LIFESTYLE VARIABLES
HAVE YOU EVER FELT YOU SHOULD CUT DOWN ON YOUR DRINKING: NO
EVER HAD A DRINK FIRST THING IN THE MORNING TO STEADY YOUR NERVES TO GET RID OF A HANGOVER: NO
HAVE PEOPLE ANNOYED YOU BY CRITICIZING YOUR DRINKING: NO
EVER FELT BAD OR GUILTY ABOUT YOUR DRINKING: NO
TOTAL SCORE: 0

## 2025-02-28 ASSESSMENT — PAIN - FUNCTIONAL ASSESSMENT: PAIN_FUNCTIONAL_ASSESSMENT: 0-10

## 2025-02-28 ASSESSMENT — COLUMBIA-SUICIDE SEVERITY RATING SCALE - C-SSRS
6. HAVE YOU EVER DONE ANYTHING, STARTED TO DO ANYTHING, OR PREPARED TO DO ANYTHING TO END YOUR LIFE?: NO
1. IN THE PAST MONTH, HAVE YOU WISHED YOU WERE DEAD OR WISHED YOU COULD GO TO SLEEP AND NOT WAKE UP?: NO
2. HAVE YOU ACTUALLY HAD ANY THOUGHTS OF KILLING YOURSELF?: NO

## 2025-02-28 ASSESSMENT — PAIN SCALES - GENERAL: PAINLEVEL_OUTOF10: 0 - NO PAIN

## 2025-02-28 NOTE — ED PROVIDER NOTES
EMERGENCY DEPARTMENT ENCOUNTER      Pt Name: Norma Villasneor  MRN: [id number]  Birthdate 1940  Date of evaluation: 2/28/2025  Provider: Mauricio Preez DO     CHIEF COMPLAINT       Chief Complaint   Patient presents with    Flu Symptoms     Pt c/o lack of apetite, fatigue, n/v, coughing, congestion. Pt denies CP and SOB. Pt states she hasn't had much to eat or drink and feels dehydrated.          HISTORY OF PRESENT ILLNESS    Norma Villasenor is a 83 yo female with pmhx of GERD, HTN, newly dx afib RVR on eliquis presents to [facility] for 4-5 day hx of nausea, vomiting, and weakness. Grandson helped provide hx. Patient reports that she never felt 100% back to her baseline since her recent admission on 2/11/25 for flu. N/V started on Monday, denies eating anything new. Denies blood in the vomit. Has not been able to keep much food or drink down since, only able to eat some jello and drink a couple sips of water. None of the other family members have the same symptoms. Has not tried anything to alleviate nausea. Denies diarrhea, bloody stools, or abdominal pain. Last BM ~3 days ago. She has been very tired the past week but able to ambulate at her baseline. Otherwise, denies any fevers, chills, SOB,  symptoms.       History provided by:  Caregiver and patient   used: No        Nursing Notes were reviewed.    PAST MEDICAL HISTORY     Past Medical History:   Diagnosis Date    GERD (gastroesophageal reflux disease)     Hypertension          SURGICAL HISTORY       Past Surgical History:   Procedure Laterality Date    CHOLECYSTECTOMY      COLONOSCOPY      CORONARY STENT PLACEMENT      OTHER SURGICAL HISTORY      UPPER GASTROINTESTINAL ENDOSCOPY           CURRENT MEDICATIONS       Previous Medications    ALLOPURINOL (ZYLOPRIM) 100 MG TABLET    Take 1 tablet (100 mg) by mouth once daily.    AMLODIPINE (NORVASC) 5 MG TABLET    TAKE 1 TABLET ONE TIME DAILY    APIXABAN (ELIQUIS) 2.5 MG TABLET    Take 1 tablet  (2.5 mg) by mouth every 12 hours.    ASPIRIN 81 MG EC TABLET    Take 1 tablet (81 mg) by mouth once daily.    CALCITRIOL (ROCALTROL) 0.25 MCG CAPSULE    Take 1 capsule (0.25 mcg) by mouth once daily.    DICYCLOMINE (BENTYL) 10 MG CAPSULE    Take 1 capsule (10 mg) by mouth 3 times a day as needed (cramps).    DRONEDARONE (MULTAQ) 400 MG TABLET    Take 1 tablet (400 mg) by mouth 2 times a day.    FLUTICASONE FUROATE-VILANTEROL (BREO ELLIPTA) 200-25 MCG/DOSE INHALER    Inhale 1 puff once daily.    GABAPENTIN (NEURONTIN) 100 MG CAPSULE    Take 1 capsule (100 mg) by mouth once daily at bedtime.    MECLIZINE (ANTIVERT) 25 MG TABLET    Take 1 tablet (25 mg) by mouth 3 times a day as needed for dizziness.    METOPROLOL SUCCINATE XL (TOPROL-XL) 50 MG 24 HR TABLET    Take 1 tablet (50 mg) by mouth once daily at bedtime. Do not crush or chew.    PANTOPRAZOLE (PROTONIX) 40 MG EC TABLET    Take 1 tablet (40 mg) by mouth once daily.    POTASSIUM CHLORIDE CR 20 MEQ ER TABLET    Take 1 tablet (20 mEq) by mouth once daily.    ROSUVASTATIN (CRESTOR) 5 MG TABLET    Take 1 tablet (5 mg) by mouth once daily at bedtime.    TIZANIDINE (ZANAFLEX) 2 MG TABLET    Take 1 tablet (2 mg) by mouth once daily at bedtime.       ALLERGIES     Patient has no known allergies.    FAMILY HISTORY     No family history on file.       SOCIAL HISTORY       Social History     Socioeconomic History    Marital status:    Tobacco Use    Smoking status: Never     Passive exposure: Past    Smokeless tobacco: Never   Substance and Sexual Activity    Alcohol use: Not Currently    Drug use: Never     Social Drivers of Health     Financial Resource Strain: Low Risk  (2/11/2025)    Overall Financial Resource Strain (CARDIA)     Difficulty of Paying Living Expenses: Not hard at all   Food Insecurity: No Food Insecurity (2/7/2025)    Hunger Vital Sign     Worried About Running Out of Food in the Last Year: Never true     Ran Out of Food in the Last Year: Never  true   Transportation Needs: No Transportation Needs (2/11/2025)    PRAPARE - Transportation     Lack of Transportation (Medical): No     Lack of Transportation (Non-Medical): No   Intimate Partner Violence: Not At Risk (2/7/2025)    Humiliation, Afraid, Rape, and Kick questionnaire     Fear of Current or Ex-Partner: No     Emotionally Abused: No     Physically Abused: No     Sexually Abused: No   Housing Stability: Low Risk  (2/11/2025)    Housing Stability Vital Sign     Unable to Pay for Housing in the Last Year: No     Number of Times Moved in the Last Year: 1     Homeless in the Last Year: No       SCREENINGS                        PHYSICAL EXAM    (up to 7 for level 4, 8 or more for level 5)     ED Triage Vitals [02/28/25 1350]   Temperature Heart Rate Respirations BP   36.4 °C (97.5 °F) 66 18 143/66      Pulse Ox Temp Source Heart Rate Source Patient Position   99 % Temporal Monitor Sitting      BP Location FiO2 (%)     Right arm --       Physical Exam  Vitals and nursing note reviewed.   Constitutional:       General: She is not in acute distress.     Appearance: Normal appearance. She is not ill-appearing.   HENT:      Head: Normocephalic and atraumatic. No raccoon eyes, Gurrola's sign, contusion or laceration.      Jaw: No trismus or malocclusion.      Right Ear: External ear normal.      Left Ear: External ear normal.      Mouth/Throat:      Mouth: Mucous membranes are dry.      Pharynx: Oropharynx is clear.   Eyes:      Extraocular Movements: Extraocular movements intact.      Pupils: Pupils are equal, round, and reactive to light.   Neck:      Trachea: No tracheal deviation.   Cardiovascular:      Rate and Rhythm: Normal rate and regular rhythm.      Pulses: Normal pulses.   Pulmonary:      Effort: Pulmonary effort is normal. No respiratory distress.      Breath sounds: Normal breath sounds. No wheezing, rhonchi or rales.   Chest:      Chest wall: No tenderness.   Abdominal:      General: Abdomen is  flat. Bowel sounds are normal. There is no distension.      Palpations: Abdomen is soft. There is no mass.      Tenderness: There is no abdominal tenderness. There is no guarding or rebound.   Musculoskeletal:         General: No tenderness or signs of injury.      Right lower leg: No edema.      Left lower leg: No edema.   Skin:     General: Skin is warm and dry.      Coloration: Skin is not jaundiced or pale.      Findings: No petechiae, rash or wound.   Neurological:      Mental Status: She is alert. Mental status is at baseline.          DIAGNOSTIC RESULTS     LABS:  Labs Reviewed   COMPREHENSIVE METABOLIC PANEL - Abnormal       Result Value    Glucose 106 (*)     Sodium 140      Potassium 3.4 (*)     Chloride 104      Bicarbonate 23      Anion Gap 16      Urea Nitrogen 24 (*)     Creatinine 1.09 (*)     eGFR 51 (*)     Calcium 10.2      Albumin 4.3      Alkaline Phosphatase 48      Total Protein 8.1      AST 13      Bilirubin, Total 0.4      ALT 7     URINALYSIS WITH REFLEX CULTURE AND MICROSCOPIC - Abnormal    Color, Urine Light-Yellow      Appearance, Urine Turbid (*)     Specific Gravity, Urine 1.014      pH, Urine 5.5      Protein, Urine 30 (1+) (*)     Glucose, Urine Normal      Blood, Urine 0.03 (TRACE) (*)     Ketones, Urine 20 (1+) (*)     Bilirubin, Urine NEGATIVE      Urobilinogen, Urine Normal      Nitrite, Urine 2+ (*)     Leukocyte Esterase, Urine 500 Solitario/uL (*)    MICROSCOPIC ONLY, URINE - Abnormal    WBC, Urine >50 (*)     RBC, Urine 3-5      Squamous Epithelial Cells, Urine 1-9 (SPARSE)      Bacteria, Urine 1+ (*)     Mucus, Urine FEW      Hyaline Casts, Urine 1+ (*)    TROPONIN I, HIGH SENSITIVITY - Normal    Troponin I, High Sensitivity 12      Narrative:     Less than 99th percentile of normal range cutoff-  Female and children under 18 years old <14 ng/L; Male <21 ng/L: Negative  Repeat testing should be performed if clinically indicated.     Female and children under 18 years old 14-50  ng/L; Male 21-50 ng/L:  Consistent with possible cardiac damage and possible increased clinical   risk. Serial measurements may help to assess extent of myocardial damage.     >50 ng/L: Consistent with cardiac damage, increased clinical risk and  myocardial infarction. Serial measurements may help assess extent of   myocardial damage.      NOTE: Children less than 1 year old may have higher baseline troponin   levels and results should be interpreted in conjunction with the overall   clinical context.     NOTE: Troponin I testing is performed using a different   testing methodology at [facility] than at other   [facility] hospitals. Direct result comparisons should only   be made within the same method.   MAGNESIUM - Normal    Magnesium 2.05     SARS-COV-2 AND INFLUENZA A/B PCR - Normal    Flu A Result Not Detected      Flu B Result Not Detected      Coronavirus 2019, PCR Not Detected      Narrative:     This assay is an FDA-cleared, in vitro diagnostic nucleic acid amplification test for the qualitative detection and differentiation of SARS CoV-2/ Influenza A/B from nasopharyngeal specimens collected from individuals with signs and symptoms of respiratory tract infections, and has been validated for use at [facility]. Negative results do not preclude COVID-19/ Influenza A/B infections and should not be used as the sole basis for diagnosis, treatment, or other management decisions. Testing for SARS CoV-2 is recommended only for patients who meet current clinical and/or epidemiological criteria defined by federal, state, or local public health directives.   URINE CULTURE   CBC WITH AUTO DIFFERENTIAL    WBC 5.5      nRBC 0.0      RBC 4.33      Hemoglobin 12.8      Hematocrit 38.0      MCV 88      MCH 29.6      MCHC 33.7      RDW 13.2      Platelets 339      Neutrophils % 65.0      Immature Granulocytes %, Automated 0.4      Lymphocytes % 25.9      Monocytes % 7.6      Eosinophils %  0.7      Basophils % 0.4      Neutrophils Absolute 3.59      Immature Granulocytes Absolute, Automated 0.02      Lymphocytes Absolute 1.43      Monocytes Absolute 0.42      Eosinophils Absolute 0.04      Basophils Absolute 0.02     URINALYSIS WITH REFLEX CULTURE AND MICROSCOPIC    Narrative:     The following orders were created for panel order Urinalysis with Reflex Culture and Microscopic.  Procedure                               Abnormality         Status                     ---------                               -----------         ------                     Urinalysis with Reflex C...[[id number]]  Abnormal            Final result               Extra Urine Gray Tube[[id number]]                            In process                   Please view results for these tests on the individual orders.   EXTRA URINE GRAY TUBE       All other labs were within normal range or not returned as of this dictation.    Imaging  XR chest 1 view   Final Result   No radiographic evidence of acute cardiopulmonary disease.   Signed by Karri Matthews MD           Procedures  Procedures     EMERGENCY DEPARTMENT COURSE/MDM:     Medical Decision Making  Like patient is a 84-year-old female with past medical history of GERD, recent diagnosis of A-fib RVR on Eliquis, hypertension presents to our ED due to 4-day history of nausea, vomiting, and weakness.  On arrival vital signs stable,, physical exam did not show abdominal or flank tenderness, tachycardia, or altered mental status.  Given persistent vomiting, basic labs, UA, chest x-ray ordered to assess for electrolyte derangements, cardiac event, COVID, flu, hepatorenal dysfunction, urinary infection.  Patient was given Zofran for antiemesis, as well as 1 L rehydration.  Cardiac workup negative for ACS, pericarditis, pneumothorax, [place]er[place]ave.  Urine was positive for ketones, nitrite, leuk esterase and WBCs, given vomiting this is consistent with pyelonephritis.  Pyelonephritis treated with  Rocephin 2 g IV. Also found to have hypoK, s/p repletion.       ED Course as of 02/28/25 2158 Fri Feb 28, 2025   1502 Coronavirus 2019, PCR: Not Detected [CB]   1502 Flu B Result: Not Detected [CB]   1502 Flu A Result: Not Detected [CB]   1502 CBC and Auto Differential  No acute leukocytosis, leukopenia, thrombocytosis, thrombocytopenia, or anemia [CB]   1502 Troponin I, High Sensitivity: 12  Not elevated doubt acs or myopericarditis [CB]   1502 Comprehensive metabolic panel(!)  Mild hypokalemia, otherwise normal [CB]   1551 84-year-old female presents to the ED for poor p.o. intake and persistent vomiting.  Lives with her daughter, grandchildren.  Since then that she has had persistent vomiting, reports that she vomits 30-35 times per day including dry heaving, reports it is mucus only, nonbloody.  No chest pain, abdominal pain, stool changes.  Does report decreased urinary output, now only voids 6-8 times per day which is less for her.,  No dysuria or urinary urgency. Has not had any nausea meds at home. Had flu last week.    Family is primarily concerned because she has not been eating well. Also has been generally weak, still able to walk around.  [CB]   1655 Urinalysis with Reflex Culture and Microscopic(!)  Nitrite positive UTI concerning for pyelo given perisistent emesis. Will treat with rocephin. No prior cultures available for sensitivity comparison [CB]   1827 XR chest 1 view  No radiographic evidence of acute cardiopulmonary disease. [CB]   1907 Not Tolerating PO, will try compazine as second line [CB]   2106 Tolerating p.o. after Compazine, will prescribe Compazine and Bactrim for home management of vomiting and pyelonephritis [CB]      ED Course User Index  [CB] Mauricio Perez DO         Diagnoses as of 02/28/25 2158   Pyelonephritis   Nausea and vomiting, unspecified vomiting type       Patient and or family in agreement and understanding of treatment plan.  All questions answered.      I reviewed  the case with the attending ED physician. The attending ED physician agrees with the plan. Patient and/or patient´s representative was counseled regarding labs, imaging, likely diagnosis, and plan. All questions were answered.    ED Medications administered this visit:    Medications   ondansetron (Zofran) injection 4 mg (4 mg intravenous Given 2/28/25 1555)   lactated Ringer's bolus 1,000 mL (0 mL intravenous Stopped 2/28/25 1737)   potassium chloride CR (Klor-Con M20) ER tablet 40 mEq (40 mEq oral Given 2/28/25 1636)   cefTRIAXone (Rocephin) 2 g in sodium chloride 0.9% IV 50 mL (0 g intravenous Stopped 2/28/25 1853)   prochlorperazine (Compazine) injection 5 mg (5 mg intravenous Given 2/28/25 1921)       New Prescriptions from this visit:    New Prescriptions    PROCHLORPERAZINE (COMPAZINE) 5 MG TABLET    Take 1 tablet (5 mg) by mouth every 6 hours if needed for nausea or vomiting for up to 3 days.    SULFAMETHOXAZOLE-TRIMETHOPRIM (BACTRIM DS) 800-160 MG TABLET    Take 1 tablet by mouth 2 times a day for 7 days.       Follow-up:  Bozena Caldwell MD  [address] [id number] [phone]              Final Impression:   1. Pyelonephritis    2. Nausea and vomiting, unspecified vomiting type          (Please note that portions of this note were completed with a voice recognition program.  Efforts were made to edit the dictations but occasionally words are mis-transcribed.)       Mauricio Perez DO  Resident  02/28/25 2111       Mauricio Perez DO  Resident  02/28/25 5507

## 2025-03-01 LAB
ATRIAL RATE: 66 BPM
HOLD SPECIMEN: NORMAL
P AXIS: 55 DEGREES
P OFFSET: 203 MS
P ONSET: 148 MS
PR INTERVAL: 146 MS
Q ONSET: 221 MS
QRS COUNT: 11 BEATS
QRS DURATION: 80 MS
QT INTERVAL: 434 MS
QTC CALCULATION(BAZETT): 454 MS
QTC FREDERICIA: 448 MS
R AXIS: 31 DEGREES
T AXIS: 60 DEGREES
T OFFSET: 438 MS
VENTRICULAR RATE: 66 BPM

## 2025-03-01 NOTE — DISCHARGE INSTRUCTIONS
Please return to the ER or seek immediate medical attention if you experience new or worsening abdominal pain, persistent vomiting, persistent diarrhea, black tar stools, fever of 38C (100.4) or higher, chest pain, shortness of breath, or worsening of your current symptoms.    You are welcome back any time. Thank you for entrusting your care to us, I hope we made your visit as pleasant as possible. Wishing you well!    Dr. Perez

## 2025-03-02 LAB — BACTERIA UR CULT: ABNORMAL

## 2025-03-03 LAB — BACTERIA UR CULT: ABNORMAL

## 2025-03-07 LAB
ATRIAL RATE: 66 BPM
P AXIS: 55 DEGREES
P OFFSET: 203 MS
P ONSET: 148 MS
PR INTERVAL: 146 MS
Q ONSET: 221 MS
QRS COUNT: 11 BEATS
QRS DURATION: 80 MS
QT INTERVAL: 434 MS
QTC CALCULATION(BAZETT): 454 MS
QTC FREDERICIA: 448 MS
R AXIS: 31 DEGREES
T AXIS: 60 DEGREES
T OFFSET: 438 MS
VENTRICULAR RATE: 66 BPM

## 2025-03-08 ENCOUNTER — HOSPITAL ENCOUNTER (INPATIENT)
Facility: HOSPITAL | Age: 85
End: 2025-03-08
Attending: EMERGENCY MEDICINE | Admitting: INTERNAL MEDICINE
Payer: MEDICARE

## 2025-03-08 DIAGNOSIS — N17.9 AKI (ACUTE KIDNEY INJURY) (CMS-HCC): ICD-10-CM

## 2025-03-08 DIAGNOSIS — E86.0 DEHYDRATION: ICD-10-CM

## 2025-03-08 DIAGNOSIS — R11.2 INTRACTABLE NAUSEA AND VOMITING: Primary | ICD-10-CM

## 2025-03-08 DIAGNOSIS — I48.0 PAROXYSMAL ATRIAL FIBRILLATION (MULTI): ICD-10-CM

## 2025-03-08 PROCEDURE — 99285 EMERGENCY DEPT VISIT HI MDM: CPT | Performed by: EMERGENCY MEDICINE

## 2025-03-08 ASSESSMENT — PAIN SCALES - GENERAL: PAINLEVEL_OUTOF10: 9

## 2025-03-08 ASSESSMENT — COLUMBIA-SUICIDE SEVERITY RATING SCALE - C-SSRS
2. HAVE YOU ACTUALLY HAD ANY THOUGHTS OF KILLING YOURSELF?: NO
6. HAVE YOU EVER DONE ANYTHING, STARTED TO DO ANYTHING, OR PREPARED TO DO ANYTHING TO END YOUR LIFE?: NO
1. IN THE PAST MONTH, HAVE YOU WISHED YOU WERE DEAD OR WISHED YOU COULD GO TO SLEEP AND NOT WAKE UP?: NO

## 2025-03-08 ASSESSMENT — LIFESTYLE VARIABLES
EVER FELT BAD OR GUILTY ABOUT YOUR DRINKING: NO
HAVE YOU EVER FELT YOU SHOULD CUT DOWN ON YOUR DRINKING: NO
TOTAL SCORE: 0
EVER HAD A DRINK FIRST THING IN THE MORNING TO STEADY YOUR NERVES TO GET RID OF A HANGOVER: NO
HAVE PEOPLE ANNOYED YOU BY CRITICIZING YOUR DRINKING: NO

## 2025-03-08 ASSESSMENT — PAIN - FUNCTIONAL ASSESSMENT: PAIN_FUNCTIONAL_ASSESSMENT: 0-10

## 2025-03-09 ENCOUNTER — APPOINTMENT (OUTPATIENT)
Dept: RADIOLOGY | Facility: HOSPITAL | Age: 85
End: 2025-03-09
Payer: MEDICARE

## 2025-03-09 ENCOUNTER — APPOINTMENT (OUTPATIENT)
Dept: CARDIOLOGY | Facility: HOSPITAL | Age: 85
End: 2025-03-09
Payer: MEDICARE

## 2025-03-09 PROBLEM — R11.2 INTRACTABLE NAUSEA AND VOMITING: Status: ACTIVE | Noted: 2025-03-09

## 2025-03-09 LAB
ALBUMIN SERPL BCP-MCNC: 3.9 G/DL (ref 3.4–5)
ALBUMIN SERPL BCP-MCNC: 4.3 G/DL (ref 3.4–5)
ALP SERPL-CCNC: 50 U/L (ref 33–136)
ALT SERPL W P-5'-P-CCNC: 14 U/L (ref 7–45)
ANION GAP SERPL CALC-SCNC: 15 MMOL/L (ref 10–20)
ANION GAP SERPL CALC-SCNC: 17 MMOL/L (ref 10–20)
APPEARANCE UR: ABNORMAL
AST SERPL W P-5'-P-CCNC: 21 U/L (ref 9–39)
BACTERIA BLD CULT: NORMAL
BACTERIA BLD CULT: NORMAL
BASOPHILS # BLD AUTO: 0.04 X10*3/UL (ref 0–0.1)
BASOPHILS NFR BLD AUTO: 0.5 %
BILIRUB SERPL-MCNC: 0.4 MG/DL (ref 0–1.2)
BILIRUB UR STRIP.AUTO-MCNC: ABNORMAL MG/DL
BUN SERPL-MCNC: 44 MG/DL (ref 6–23)
BUN SERPL-MCNC: 57 MG/DL (ref 6–23)
CALCIUM SERPL-MCNC: 10.4 MG/DL (ref 8.6–10.3)
CALCIUM SERPL-MCNC: 9.7 MG/DL (ref 8.6–10.3)
CHLORIDE SERPL-SCNC: 105 MMOL/L (ref 98–107)
CHLORIDE SERPL-SCNC: 105 MMOL/L (ref 98–107)
CO2 SERPL-SCNC: 20 MMOL/L (ref 21–32)
CO2 SERPL-SCNC: 22 MMOL/L (ref 21–32)
COLOR UR: YELLOW
CREAT SERPL-MCNC: 1.79 MG/DL (ref 0.5–1.05)
CREAT SERPL-MCNC: 2.4 MG/DL (ref 0.5–1.05)
EGFRCR SERPLBLD CKD-EPI 2021: 20 ML/MIN/1.73M*2
EGFRCR SERPLBLD CKD-EPI 2021: 28 ML/MIN/1.73M*2
EOSINOPHIL # BLD AUTO: 0.01 X10*3/UL (ref 0–0.4)
EOSINOPHIL NFR BLD AUTO: 0.1 %
ERYTHROCYTE [DISTWIDTH] IN BLOOD BY AUTOMATED COUNT: 13.9 % (ref 11.5–14.5)
FLUAV RNA RESP QL NAA+PROBE: NOT DETECTED
FLUBV RNA RESP QL NAA+PROBE: NOT DETECTED
GLUCOSE SERPL-MCNC: 122 MG/DL (ref 74–99)
GLUCOSE SERPL-MCNC: 126 MG/DL (ref 74–99)
GLUCOSE UR STRIP.AUTO-MCNC: NORMAL MG/DL
HCT VFR BLD AUTO: 46.9 % (ref 36–46)
HGB BLD-MCNC: 15.1 G/DL (ref 12–16)
HOLD SPECIMEN: NORMAL
HYALINE CASTS #/AREA URNS AUTO: ABNORMAL /LPF
IMM GRANULOCYTES # BLD AUTO: 0.06 X10*3/UL (ref 0–0.5)
IMM GRANULOCYTES NFR BLD AUTO: 0.7 % (ref 0–0.9)
KETONES UR STRIP.AUTO-MCNC: NEGATIVE MG/DL
LACTATE SERPL-SCNC: 1.3 MMOL/L (ref 0.4–2)
LEUKOCYTE ESTERASE UR QL STRIP.AUTO: NEGATIVE
LYMPHOCYTES # BLD AUTO: 1.58 X10*3/UL (ref 0.8–3)
LYMPHOCYTES NFR BLD AUTO: 17.9 %
MAGNESIUM SERPL-MCNC: 2.7 MG/DL (ref 1.6–2.4)
MCH RBC QN AUTO: 29.5 PG (ref 26–34)
MCHC RBC AUTO-ENTMCNC: 32.2 G/DL (ref 32–36)
MCV RBC AUTO: 92 FL (ref 80–100)
MONOCYTES # BLD AUTO: 0.8 X10*3/UL (ref 0.05–0.8)
MONOCYTES NFR BLD AUTO: 9 %
MUCOUS THREADS #/AREA URNS AUTO: ABNORMAL /LPF
NEUTROPHILS # BLD AUTO: 6.36 X10*3/UL (ref 1.6–5.5)
NEUTROPHILS NFR BLD AUTO: 71.8 %
NITRITE UR QL STRIP.AUTO: NEGATIVE
NRBC BLD-RTO: 0 /100 WBCS (ref 0–0)
PH UR STRIP.AUTO: 5 [PH]
PHOSPHATE SERPL-MCNC: 2.8 MG/DL (ref 2.5–4.9)
PHOSPHATE SERPL-MCNC: 3.5 MG/DL (ref 2.5–4.9)
PLATELET # BLD AUTO: 361 X10*3/UL (ref 150–450)
POTASSIUM SERPL-SCNC: 4.3 MMOL/L (ref 3.5–5.3)
POTASSIUM SERPL-SCNC: 4.3 MMOL/L (ref 3.5–5.3)
PROT SERPL-MCNC: 8.2 G/DL (ref 6.4–8.2)
PROT UR STRIP.AUTO-MCNC: ABNORMAL MG/DL
RBC # BLD AUTO: 5.11 X10*6/UL (ref 4–5.2)
RBC # UR STRIP.AUTO: NEGATIVE MG/DL
RBC #/AREA URNS AUTO: ABNORMAL /HPF
SARS-COV-2 RNA RESP QL NAA+PROBE: NOT DETECTED
SODIUM SERPL-SCNC: 138 MMOL/L (ref 136–145)
SODIUM SERPL-SCNC: 138 MMOL/L (ref 136–145)
SP GR UR STRIP.AUTO: 1.02
SQUAMOUS #/AREA URNS AUTO: ABNORMAL /HPF
UROBILINOGEN UR STRIP.AUTO-MCNC: NORMAL MG/DL
WBC # BLD AUTO: 8.9 X10*3/UL (ref 4.4–11.3)
WBC #/AREA URNS AUTO: ABNORMAL /HPF

## 2025-03-09 PROCEDURE — 96365 THER/PROPH/DIAG IV INF INIT: CPT

## 2025-03-09 PROCEDURE — 2500000001 HC RX 250 WO HCPCS SELF ADMINISTERED DRUGS (ALT 637 FOR MEDICARE OP): Performed by: EMERGENCY MEDICINE

## 2025-03-09 PROCEDURE — 74176 CT ABD & PELVIS W/O CONTRAST: CPT

## 2025-03-09 PROCEDURE — 97166 OT EVAL MOD COMPLEX 45 MIN: CPT | Mod: GO

## 2025-03-09 PROCEDURE — 80053 COMPREHEN METABOLIC PANEL: CPT | Performed by: EMERGENCY MEDICINE

## 2025-03-09 PROCEDURE — 87075 CULTR BACTERIA EXCEPT BLOOD: CPT | Mod: STJLAB | Performed by: EMERGENCY MEDICINE

## 2025-03-09 PROCEDURE — 80069 RENAL FUNCTION PANEL: CPT | Mod: CCI

## 2025-03-09 PROCEDURE — 93005 ELECTROCARDIOGRAM TRACING: CPT

## 2025-03-09 PROCEDURE — 99222 1ST HOSP IP/OBS MODERATE 55: CPT

## 2025-03-09 PROCEDURE — 1100000001 HC PRIVATE ROOM DAILY

## 2025-03-09 PROCEDURE — 84100 ASSAY OF PHOSPHORUS: CPT | Performed by: EMERGENCY MEDICINE

## 2025-03-09 PROCEDURE — 2500000004 HC RX 250 GENERAL PHARMACY W/ HCPCS (ALT 636 FOR OP/ED): Performed by: EMERGENCY MEDICINE

## 2025-03-09 PROCEDURE — 87040 BLOOD CULTURE FOR BACTERIA: CPT | Mod: STJLAB | Performed by: EMERGENCY MEDICINE

## 2025-03-09 PROCEDURE — 2500000004 HC RX 250 GENERAL PHARMACY W/ HCPCS (ALT 636 FOR OP/ED): Performed by: INTERNAL MEDICINE

## 2025-03-09 PROCEDURE — 81001 URINALYSIS AUTO W/SCOPE: CPT | Performed by: EMERGENCY MEDICINE

## 2025-03-09 PROCEDURE — 83605 ASSAY OF LACTIC ACID: CPT | Performed by: EMERGENCY MEDICINE

## 2025-03-09 PROCEDURE — 36415 COLL VENOUS BLD VENIPUNCTURE: CPT | Performed by: EMERGENCY MEDICINE

## 2025-03-09 PROCEDURE — 96375 TX/PRO/DX INJ NEW DRUG ADDON: CPT

## 2025-03-09 PROCEDURE — 74176 CT ABD & PELVIS W/O CONTRAST: CPT | Performed by: RADIOLOGY

## 2025-03-09 PROCEDURE — 2500000004 HC RX 250 GENERAL PHARMACY W/ HCPCS (ALT 636 FOR OP/ED)

## 2025-03-09 PROCEDURE — 87636 SARSCOV2 & INF A&B AMP PRB: CPT | Performed by: EMERGENCY MEDICINE

## 2025-03-09 PROCEDURE — 96366 THER/PROPH/DIAG IV INF ADDON: CPT

## 2025-03-09 PROCEDURE — 85025 COMPLETE CBC W/AUTO DIFF WBC: CPT | Performed by: EMERGENCY MEDICINE

## 2025-03-09 PROCEDURE — 2500000002 HC RX 250 W HCPCS SELF ADMINISTERED DRUGS (ALT 637 FOR MEDICARE OP, ALT 636 FOR OP/ED): Performed by: INTERNAL MEDICINE

## 2025-03-09 PROCEDURE — 2500000001 HC RX 250 WO HCPCS SELF ADMINISTERED DRUGS (ALT 637 FOR MEDICARE OP): Performed by: INTERNAL MEDICINE

## 2025-03-09 PROCEDURE — 97161 PT EVAL LOW COMPLEX 20 MIN: CPT | Mod: GP

## 2025-03-09 PROCEDURE — 93010 ELECTROCARDIOGRAM REPORT: CPT | Performed by: INTERNAL MEDICINE

## 2025-03-09 PROCEDURE — 83735 ASSAY OF MAGNESIUM: CPT | Performed by: EMERGENCY MEDICINE

## 2025-03-09 RX ORDER — SODIUM CHLORIDE, SODIUM LACTATE, POTASSIUM CHLORIDE, CALCIUM CHLORIDE 600; 310; 30; 20 MG/100ML; MG/100ML; MG/100ML; MG/100ML
75 INJECTION, SOLUTION INTRAVENOUS CONTINUOUS
Status: ACTIVE | OUTPATIENT
Start: 2025-03-09 | End: 2025-03-10

## 2025-03-09 RX ORDER — ACETAMINOPHEN 325 MG/1
975 TABLET ORAL ONCE
Status: COMPLETED | OUTPATIENT
Start: 2025-03-09 | End: 2025-03-09

## 2025-03-09 RX ORDER — DICYCLOMINE HYDROCHLORIDE 10 MG/1
10 CAPSULE ORAL 3 TIMES DAILY PRN
Status: ACTIVE | OUTPATIENT
Start: 2025-03-09

## 2025-03-09 RX ORDER — FORMOTEROL FUMARATE 20 UG/2ML
20 SOLUTION RESPIRATORY (INHALATION)
Status: ACTIVE | OUTPATIENT
Start: 2025-03-09

## 2025-03-09 RX ORDER — ONDANSETRON HYDROCHLORIDE 2 MG/ML
4 INJECTION, SOLUTION INTRAVENOUS EVERY 8 HOURS PRN
Status: ACTIVE | OUTPATIENT
Start: 2025-03-09

## 2025-03-09 RX ORDER — MECLIZINE HYDROCHLORIDE 25 MG/1
25 TABLET ORAL 3 TIMES DAILY PRN
Status: DISPENSED | OUTPATIENT
Start: 2025-03-09

## 2025-03-09 RX ORDER — POTASSIUM CHLORIDE 20 MEQ/1
20 TABLET, EXTENDED RELEASE ORAL DAILY
Status: ACTIVE | OUTPATIENT
Start: 2025-03-09

## 2025-03-09 RX ORDER — GABAPENTIN 100 MG/1
100 CAPSULE ORAL NIGHTLY
Status: DISPENSED | OUTPATIENT
Start: 2025-03-09

## 2025-03-09 RX ORDER — SODIUM CHLORIDE, SODIUM LACTATE, POTASSIUM CHLORIDE, CALCIUM CHLORIDE 600; 310; 30; 20 MG/100ML; MG/100ML; MG/100ML; MG/100ML
75 INJECTION, SOLUTION INTRAVENOUS CONTINUOUS
Status: ACTIVE | OUTPATIENT
Start: 2025-03-09 | End: 2025-03-09

## 2025-03-09 RX ORDER — PANTOPRAZOLE SODIUM 40 MG/1
40 TABLET, DELAYED RELEASE ORAL DAILY
Status: ACTIVE | OUTPATIENT
Start: 2025-03-09

## 2025-03-09 RX ORDER — FLUTICASONE FUROATE AND VILANTEROL 200; 25 UG/1; UG/1
1 POWDER RESPIRATORY (INHALATION) DAILY
Status: DISCONTINUED | OUTPATIENT
Start: 2025-03-09 | End: 2025-03-09

## 2025-03-09 RX ORDER — ASPIRIN 81 MG/1
81 TABLET ORAL DAILY
Status: ACTIVE | OUTPATIENT
Start: 2025-03-09

## 2025-03-09 RX ORDER — PROCHLORPERAZINE EDISYLATE 5 MG/ML
10 INJECTION INTRAMUSCULAR; INTRAVENOUS EVERY 6 HOURS PRN
Status: DISPENSED | OUTPATIENT
Start: 2025-03-09

## 2025-03-09 RX ORDER — AMLODIPINE BESYLATE 5 MG/1
5 TABLET ORAL DAILY
Status: ACTIVE | OUTPATIENT
Start: 2025-03-09

## 2025-03-09 RX ORDER — ALLOPURINOL 100 MG/1
100 TABLET ORAL DAILY
Status: ACTIVE | OUTPATIENT
Start: 2025-03-09

## 2025-03-09 RX ORDER — ONDANSETRON 4 MG/1
4 TABLET, FILM COATED ORAL EVERY 8 HOURS PRN
Status: DISPENSED | OUTPATIENT
Start: 2025-03-09

## 2025-03-09 RX ORDER — BUDESONIDE 0.5 MG/2ML
0.5 INHALANT ORAL
Status: ACTIVE | OUTPATIENT
Start: 2025-03-09

## 2025-03-09 RX ORDER — METOPROLOL SUCCINATE 50 MG/1
50 TABLET, EXTENDED RELEASE ORAL NIGHTLY
Status: DISPENSED | OUTPATIENT
Start: 2025-03-09

## 2025-03-09 RX ORDER — ONDANSETRON HYDROCHLORIDE 2 MG/ML
4 INJECTION, SOLUTION INTRAVENOUS ONCE
Status: COMPLETED | OUTPATIENT
Start: 2025-03-09 | End: 2025-03-09

## 2025-03-09 RX ORDER — TIZANIDINE 2 MG/1
2 TABLET ORAL NIGHTLY
Status: DISPENSED | OUTPATIENT
Start: 2025-03-09

## 2025-03-09 RX ORDER — ROSUVASTATIN CALCIUM 5 MG/1
5 TABLET, COATED ORAL NIGHTLY
Status: DISPENSED | OUTPATIENT
Start: 2025-03-09

## 2025-03-09 RX ORDER — CEFTRIAXONE 1 G/50ML
1 INJECTION, SOLUTION INTRAVENOUS EVERY 24 HOURS
Status: DISCONTINUED | OUTPATIENT
Start: 2025-03-10 | End: 2025-03-09

## 2025-03-09 RX ADMIN — METOPROLOL SUCCINATE 50 MG: 50 TABLET, EXTENDED RELEASE ORAL at 19:55

## 2025-03-09 RX ADMIN — DRONEDARONE 400 MG: 400 TABLET, FILM COATED ORAL at 19:55

## 2025-03-09 RX ADMIN — SODIUM CHLORIDE, POTASSIUM CHLORIDE, SODIUM LACTATE AND CALCIUM CHLORIDE 75 ML/HR: 600; 310; 30; 20 INJECTION, SOLUTION INTRAVENOUS at 06:41

## 2025-03-09 RX ADMIN — TIZANIDINE 2 MG: 2 TABLET ORAL at 19:56

## 2025-03-09 RX ADMIN — BENZOCAINE AND MENTHOL 1 LOZENGE: 15; 3.6 LOZENGE ORAL at 17:51

## 2025-03-09 RX ADMIN — BUDESONIDE 0.5 MG: 0.5 INHALANT RESPIRATORY (INHALATION) at 20:46

## 2025-03-09 RX ADMIN — PROCHLORPERAZINE EDISYLATE 10 MG: 5 INJECTION INTRAMUSCULAR; INTRAVENOUS at 05:29

## 2025-03-09 RX ADMIN — ONDANSETRON HYDROCHLORIDE 4 MG: 4 TABLET, FILM COATED ORAL at 10:18

## 2025-03-09 RX ADMIN — PROCHLORPERAZINE EDISYLATE 10 MG: 5 INJECTION INTRAMUSCULAR; INTRAVENOUS at 19:53

## 2025-03-09 RX ADMIN — ONDANSETRON 4 MG: 2 INJECTION INTRAMUSCULAR; INTRAVENOUS at 00:47

## 2025-03-09 RX ADMIN — ACETAMINOPHEN 975 MG: 325 TABLET ORAL at 05:28

## 2025-03-09 RX ADMIN — ROSUVASTATIN CALCIUM 5 MG: 5 TABLET, FILM COATED ORAL at 19:56

## 2025-03-09 RX ADMIN — SODIUM CHLORIDE, POTASSIUM CHLORIDE, SODIUM LACTATE AND CALCIUM CHLORIDE 75 ML/HR: 600; 310; 30; 20 INJECTION, SOLUTION INTRAVENOUS at 19:55

## 2025-03-09 RX ADMIN — MECLIZINE HYDROCHLORIDE 25 MG: 25 TABLET ORAL at 19:53

## 2025-03-09 RX ADMIN — CEFTRIAXONE 2 G: 2 INJECTION, POWDER, FOR SOLUTION INTRAMUSCULAR; INTRAVENOUS at 00:47

## 2025-03-09 RX ADMIN — ONDANSETRON HYDROCHLORIDE 4 MG: 4 TABLET, FILM COATED ORAL at 18:29

## 2025-03-09 RX ADMIN — FORMOTEROL FUMARATE 20 MCG: 20 SOLUTION RESPIRATORY (INHALATION) at 20:45

## 2025-03-09 RX ADMIN — SODIUM CHLORIDE, POTASSIUM CHLORIDE, SODIUM LACTATE AND CALCIUM CHLORIDE 1000 ML: 600; 310; 30; 20 INJECTION, SOLUTION INTRAVENOUS at 00:47

## 2025-03-09 RX ADMIN — GABAPENTIN 100 MG: 100 CAPSULE ORAL at 19:55

## 2025-03-09 SDOH — SOCIAL STABILITY: SOCIAL INSECURITY: WITHIN THE LAST YEAR, HAVE YOU BEEN AFRAID OF YOUR PARTNER OR EX-PARTNER?: PATIENT UNABLE TO ANSWER

## 2025-03-09 SDOH — SOCIAL STABILITY: SOCIAL NETWORK: IN A TYPICAL WEEK, HOW MANY TIMES DO YOU TALK ON THE PHONE WITH FAMILY, FRIENDS, OR NEIGHBORS?: PATIENT UNABLE TO ANSWER

## 2025-03-09 SDOH — SOCIAL STABILITY: SOCIAL NETWORK: HOW OFTEN DO YOU ATTEND CHURCH OR RELIGIOUS SERVICES?: PATIENT UNABLE TO ANSWER

## 2025-03-09 SDOH — SOCIAL STABILITY: SOCIAL INSECURITY
WITHIN THE LAST YEAR, HAVE YOU BEEN KICKED, HIT, SLAPPED, OR OTHERWISE PHYSICALLY HURT BY YOUR PARTNER OR EX-PARTNER?: PATIENT UNABLE TO ANSWER

## 2025-03-09 SDOH — ECONOMIC STABILITY: INCOME INSECURITY
IN THE PAST 12 MONTHS HAS THE ELECTRIC, GAS, OIL, OR WATER COMPANY THREATENED TO SHUT OFF SERVICES IN YOUR HOME?: PATIENT UNABLE TO ANSWER

## 2025-03-09 SDOH — HEALTH STABILITY: MENTAL HEALTH
DO YOU FEEL STRESS - TENSE, RESTLESS, NERVOUS, OR ANXIOUS, OR UNABLE TO SLEEP AT NIGHT BECAUSE YOUR MIND IS TROUBLED ALL THE TIME - THESE DAYS?: PATIENT UNABLE TO ANSWER

## 2025-03-09 SDOH — SOCIAL STABILITY: SOCIAL INSECURITY: HAS ANYONE EVER THREATENED TO HURT YOUR FAMILY OR YOUR PETS?: UNABLE TO ASSESS

## 2025-03-09 SDOH — SOCIAL STABILITY: SOCIAL INSECURITY
WITHIN THE LAST YEAR, HAVE YOU BEEN RAPED OR FORCED TO HAVE ANY KIND OF SEXUAL ACTIVITY BY YOUR PARTNER OR EX-PARTNER?: PATIENT UNABLE TO ANSWER

## 2025-03-09 SDOH — ECONOMIC STABILITY: HOUSING INSECURITY: AT ANY TIME IN THE PAST 12 MONTHS, WERE YOU HOMELESS OR LIVING IN A SHELTER (INCLUDING NOW)?: PATIENT UNABLE TO ANSWER

## 2025-03-09 SDOH — SOCIAL STABILITY: SOCIAL NETWORK
DO YOU BELONG TO ANY CLUBS OR ORGANIZATIONS SUCH AS CHURCH GROUPS, UNIONS, FRATERNAL OR ATHLETIC GROUPS, OR SCHOOL GROUPS?: PATIENT UNABLE TO ANSWER

## 2025-03-09 SDOH — SOCIAL STABILITY: SOCIAL NETWORK: HOW OFTEN DO YOU GET TOGETHER WITH FRIENDS OR RELATIVES?: PATIENT UNABLE TO ANSWER

## 2025-03-09 SDOH — ECONOMIC STABILITY: HOUSING INSECURITY
IN THE LAST 12 MONTHS, WAS THERE A TIME WHEN YOU WERE NOT ABLE TO PAY THE MORTGAGE OR RENT ON TIME?: PATIENT UNABLE TO ANSWER

## 2025-03-09 SDOH — SOCIAL STABILITY: SOCIAL INSECURITY
WITHIN THE LAST YEAR, HAVE YOU BEEN HUMILIATED OR EMOTIONALLY ABUSED IN OTHER WAYS BY YOUR PARTNER OR EX-PARTNER?: PATIENT UNABLE TO ANSWER

## 2025-03-09 SDOH — SOCIAL STABILITY: SOCIAL INSECURITY: HAVE YOU HAD THOUGHTS OF HARMING ANYONE ELSE?: UNABLE TO ASSESS

## 2025-03-09 SDOH — ECONOMIC STABILITY: HOUSING INSECURITY: IN THE PAST 12 MONTHS, HOW MANY TIMES HAVE YOU MOVED WHERE YOU WERE LIVING?: 0

## 2025-03-09 SDOH — ECONOMIC STABILITY: FOOD INSECURITY
HOW HARD IS IT FOR YOU TO PAY FOR THE VERY BASICS LIKE FOOD, HOUSING, MEDICAL CARE, AND HEATING?: PATIENT UNABLE TO ANSWER

## 2025-03-09 SDOH — SOCIAL STABILITY: SOCIAL INSECURITY: DO YOU FEEL ANYONE HAS EXPLOITED OR TAKEN ADVANTAGE OF YOU FINANCIALLY OR OF YOUR PERSONAL PROPERTY?: UNABLE TO ASSESS

## 2025-03-09 SDOH — SOCIAL STABILITY: SOCIAL INSECURITY: ARE YOU OR HAVE YOU BEEN THREATENED OR ABUSED PHYSICALLY, EMOTIONALLY, OR SEXUALLY BY ANYONE?: UNABLE TO ASSESS

## 2025-03-09 SDOH — ECONOMIC STABILITY: TRANSPORTATION INSECURITY
IN THE PAST 12 MONTHS, HAS LACK OF TRANSPORTATION KEPT YOU FROM MEDICAL APPOINTMENTS OR FROM GETTING MEDICATIONS?: PATIENT UNABLE TO ANSWER

## 2025-03-09 SDOH — SOCIAL STABILITY: SOCIAL INSECURITY: WERE YOU ABLE TO COMPLETE ALL THE BEHAVIORAL HEALTH SCREENINGS?: NO

## 2025-03-09 SDOH — SOCIAL STABILITY: SOCIAL INSECURITY: ARE THERE ANY APPARENT SIGNS OF INJURIES/BEHAVIORS THAT COULD BE RELATED TO ABUSE/NEGLECT?: UNABLE TO ASSESS

## 2025-03-09 SDOH — SOCIAL STABILITY: SOCIAL INSECURITY: HAVE YOU HAD ANY THOUGHTS OF HARMING ANYONE ELSE?: UNABLE TO ASSESS

## 2025-03-09 SDOH — SOCIAL STABILITY: SOCIAL INSECURITY: DOES ANYONE TRY TO KEEP YOU FROM HAVING/CONTACTING OTHER FRIENDS OR DOING THINGS OUTSIDE YOUR HOME?: UNABLE TO ASSESS

## 2025-03-09 SDOH — ECONOMIC STABILITY: FOOD INSECURITY
WITHIN THE PAST 12 MONTHS, THE FOOD YOU BOUGHT JUST DIDN'T LAST AND YOU DIDN'T HAVE MONEY TO GET MORE.: PATIENT UNABLE TO ANSWER

## 2025-03-09 SDOH — ECONOMIC STABILITY: FOOD INSECURITY
WITHIN THE PAST 12 MONTHS, YOU WORRIED THAT YOUR FOOD WOULD RUN OUT BEFORE YOU GOT THE MONEY TO BUY MORE.: PATIENT UNABLE TO ANSWER

## 2025-03-09 SDOH — SOCIAL STABILITY: SOCIAL INSECURITY: ABUSE: ADULT

## 2025-03-09 SDOH — SOCIAL STABILITY: SOCIAL INSECURITY: ARE YOU MARRIED, WIDOWED, DIVORCED, SEPARATED, NEVER MARRIED, OR LIVING WITH A PARTNER?: PATIENT UNABLE TO ANSWER

## 2025-03-09 SDOH — SOCIAL STABILITY: SOCIAL NETWORK: HOW OFTEN DO YOU ATTEND MEETINGS OF THE CLUBS OR ORGANIZATIONS YOU BELONG TO?: PATIENT UNABLE TO ANSWER

## 2025-03-09 SDOH — SOCIAL STABILITY: SOCIAL INSECURITY: DO YOU FEEL UNSAFE GOING BACK TO THE PLACE WHERE YOU ARE LIVING?: UNABLE TO ASSESS

## 2025-03-09 ASSESSMENT — COGNITIVE AND FUNCTIONAL STATUS - GENERAL
DRESSING REGULAR UPPER BODY CLOTHING: A LITTLE
HELP NEEDED FOR BATHING: A LITTLE
DRESSING REGULAR LOWER BODY CLOTHING: A LITTLE
DRESSING REGULAR LOWER BODY CLOTHING: A LITTLE
EATING MEALS: A LITTLE
HELP NEEDED FOR BATHING: A LITTLE
MOVING TO AND FROM BED TO CHAIR: A LITTLE
CLIMB 3 TO 5 STEPS WITH RAILING: A LITTLE
DRESSING REGULAR UPPER BODY CLOTHING: A LITTLE
EATING MEALS: A LITTLE
MOBILITY SCORE: 18
TOILETING: A LITTLE
DAILY ACTIVITIY SCORE: 18
MOVING FROM LYING ON BACK TO SITTING ON SIDE OF FLAT BED WITH BEDRAILS: A LITTLE
MOBILITY SCORE: 18
CLIMB 3 TO 5 STEPS WITH RAILING: TOTAL
TURNING FROM BACK TO SIDE WHILE IN FLAT BAD: A LITTLE
STANDING UP FROM CHAIR USING ARMS: A LITTLE
DRESSING REGULAR UPPER BODY CLOTHING: A LITTLE
PATIENT BASELINE BEDBOUND: NO
DAILY ACTIVITIY SCORE: 18
CLIMB 3 TO 5 STEPS WITH RAILING: A LITTLE
PERSONAL GROOMING: A LITTLE
PERSONAL GROOMING: A LITTLE
MOVING TO AND FROM BED TO CHAIR: A LITTLE
WALKING IN HOSPITAL ROOM: A LITTLE
PERSONAL GROOMING: A LITTLE
DRESSING REGULAR LOWER BODY CLOTHING: A LITTLE
TOILETING: A LITTLE
DRESSING REGULAR UPPER BODY CLOTHING: A LITTLE
DAILY ACTIVITIY SCORE: 18
HELP NEEDED FOR BATHING: A LITTLE
EATING MEALS: A LITTLE
MOVING FROM LYING ON BACK TO SITTING ON SIDE OF FLAT BED WITH BEDRAILS: A LITTLE
WALKING IN HOSPITAL ROOM: A LITTLE
TURNING FROM BACK TO SIDE WHILE IN FLAT BAD: A LITTLE
DAILY ACTIVITIY SCORE: 18
CLIMB 3 TO 5 STEPS WITH RAILING: A LITTLE
TURNING FROM BACK TO SIDE WHILE IN FLAT BAD: A LITTLE
MOBILITY SCORE: 17
EATING MEALS: A LITTLE
WALKING IN HOSPITAL ROOM: A LITTLE
TOILETING: A LITTLE
TOILETING: A LITTLE
STANDING UP FROM CHAIR USING ARMS: A LITTLE
WALKING IN HOSPITAL ROOM: A LITTLE
PERSONAL GROOMING: A LITTLE
DRESSING REGULAR LOWER BODY CLOTHING: A LITTLE
MOVING TO AND FROM BED TO CHAIR: A LITTLE
STANDING UP FROM CHAIR USING ARMS: A LITTLE
MOVING TO AND FROM BED TO CHAIR: A LITTLE
MOVING FROM LYING ON BACK TO SITTING ON SIDE OF FLAT BED WITH BEDRAILS: A LITTLE
STANDING UP FROM CHAIR USING ARMS: A LITTLE
MOBILITY SCORE: 18
TURNING FROM BACK TO SIDE WHILE IN FLAT BAD: A LITTLE
HELP NEEDED FOR BATHING: A LITTLE

## 2025-03-09 ASSESSMENT — LIFESTYLE VARIABLES
HOW MANY STANDARD DRINKS CONTAINING ALCOHOL DO YOU HAVE ON A TYPICAL DAY: PATIENT DOES NOT DRINK
HOW OFTEN DO YOU HAVE 6 OR MORE DRINKS ON ONE OCCASION: NEVER
SKIP TO QUESTIONS 9-10: 1
HOW OFTEN DO YOU HAVE A DRINK CONTAINING ALCOHOL: NEVER
AUDIT-C TOTAL SCORE: 0
AUDIT-C TOTAL SCORE: 0

## 2025-03-09 ASSESSMENT — ACTIVITIES OF DAILY LIVING (ADL)
ADEQUATE_TO_COMPLETE_ADL: YES
LACK_OF_TRANSPORTATION: PATIENT UNABLE TO ANSWER
TOILETING: NEEDS ASSISTANCE
JUDGMENT_ADEQUATE_SAFELY_COMPLETE_DAILY_ACTIVITIES: YES
BATHING: NEEDS ASSISTANCE
FEEDING YOURSELF: INDEPENDENT
WALKS IN HOME: NEEDS ASSISTANCE
LACK_OF_TRANSPORTATION: PATIENT UNABLE TO ANSWER
HEARING - LEFT EAR: DIFFICULTY WITH NOISE
DRESSING YOURSELF: NEEDS ASSISTANCE
BATHING_ASSISTANCE: STAND BY
PATIENT'S MEMORY ADEQUATE TO SAFELY COMPLETE DAILY ACTIVITIES?: YES
HEARING - RIGHT EAR: DIFFICULTY WITH NOISE
GROOMING: NEEDS ASSISTANCE

## 2025-03-09 ASSESSMENT — PAIN SCALES - GENERAL
PAINLEVEL_OUTOF10: 0 - NO PAIN
PAINLEVEL_OUTOF10: 7
PAINLEVEL_OUTOF10: 0 - NO PAIN

## 2025-03-09 ASSESSMENT — PAIN - FUNCTIONAL ASSESSMENT
PAIN_FUNCTIONAL_ASSESSMENT: 0-10

## 2025-03-09 ASSESSMENT — PATIENT HEALTH QUESTIONNAIRE - PHQ9
SUM OF ALL RESPONSES TO PHQ9 QUESTIONS 1 & 2: 0
1. LITTLE INTEREST OR PLEASURE IN DOING THINGS: NOT AT ALL
2. FEELING DOWN, DEPRESSED OR HOPELESS: NOT AT ALL

## 2025-03-09 ASSESSMENT — PAIN DESCRIPTION - LOCATION: LOCATION: GENERALIZED

## 2025-03-09 NOTE — CARE PLAN
Problem: Skin  Goal: Decreased wound size/increased tissue granulation at next dressing change  Outcome: Progressing  Flowsheets (Taken 3/9/2025 1132)  Decreased wound size/increased tissue granulation at next dressing change:   Promote sleep for wound healing   Protective dressings over bony prominences  Goal: Participates in plan/prevention/treatment measures  Outcome: Progressing  Flowsheets (Taken 3/9/2025 1132)  Participates in plan/prevention/treatment measures:   Discuss with provider PT/OT consult   Elevate heels  Goal: Prevent/manage excess moisture  Outcome: Progressing  Flowsheets (Taken 3/9/2025 1132)  Prevent/manage excess moisture:   Cleanse incontinence/protect with barrier cream   Moisturize dry skin   Monitor for/manage infection if present  Goal: Prevent/minimize sheer/friction injuries  Outcome: Progressing  Flowsheets (Taken 3/9/2025 1132)  Prevent/minimize sheer/friction injuries:   Complete micro-shifts as needed if patient unable. Adjust patient position to relieve pressure points, not a full turn   Turn/reposition every 2 hours/use positioning/transfer devices   Utilize specialty bed per algorithm  Goal: Promote/optimize nutrition  Outcome: Progressing  Flowsheets (Taken 3/9/2025 1132)  Promote/optimize nutrition:   Monitor/record intake including meals   Offer water/supplements/favorite foods  Goal: Promote skin healing  Outcome: Progressing  Flowsheets (Taken 3/9/2025 1132)  Promote skin healing:   Rotate device position/do not position patient on device   Turn/reposition every 2 hours/use positioning/transfer devices   The patient's goals for the shift include      The clinical goals for the shift include completed

## 2025-03-09 NOTE — ED PROVIDER NOTES
HPI   Chief Complaint   Patient presents with    Vomiting    Weakness, Gen     X2 weeks       This is an 84 years old female patient presented to the emergency department with a chief complaint of feeling generally weak, multiple episodes of nonbloody vomiting, feeling dehydrated.  Also reported diarrhea a few days ago that was nonbloody and nonmelanotic.  Denies any chest pain, shortness of breath, cough, headaches, weakness, or focal numbness.    Review of system: As stated above in the HPI section.              Patient History   Past Medical History:   Diagnosis Date    GERD (gastroesophageal reflux disease)     Hypertension      Past Surgical History:   Procedure Laterality Date    CHOLECYSTECTOMY      COLONOSCOPY      CORONARY STENT PLACEMENT      OTHER SURGICAL HISTORY      UPPER GASTROINTESTINAL ENDOSCOPY       No family history on file.  Social History     Tobacco Use    Smoking status: Never     Passive exposure: Past    Smokeless tobacco: Never   Vaping Use    Vaping status: Not on file   Substance Use Topics    Alcohol use: Not Currently    Drug use: Never       Physical Exam   ED Triage Vitals   Temperature Heart Rate Respirations BP   03/08/25 2244 03/08/25 2244 03/08/25 2244 03/08/25 2244   36.4 °C (97.5 °F) 95 18 120/69      Pulse Ox Temp src Heart Rate Source Patient Position   03/08/25 2244 -- 03/09/25 0150 03/08/25 2244   98 %  Monitor Sitting      BP Location FiO2 (%)     03/08/25 2244 --     Right arm        Physical Exam  Vitals and nursing note reviewed.   Constitutional:       General: She is not in acute distress.     Appearance: She is well-developed.   HENT:      Head: Normocephalic and atraumatic.   Eyes:      Conjunctiva/sclera: Conjunctivae normal.   Cardiovascular:      Rate and Rhythm: Normal rate and regular rhythm.      Heart sounds: No murmur heard.  Pulmonary:      Effort: Pulmonary effort is normal. No respiratory distress.      Breath sounds: Normal breath sounds.   Abdominal:       General: There is no distension.      Palpations: Abdomen is soft.      Tenderness: There is no abdominal tenderness. There is no guarding.   Musculoskeletal:         General: No swelling.      Cervical back: Neck supple.   Skin:     General: Skin is warm and dry.      Capillary Refill: Capillary refill takes less than 2 seconds.   Neurological:      Mental Status: She is alert.   Psychiatric:         Mood and Affect: Mood normal.           ED Course & MDM   ED Course as of 03/09/25 2222   Sun Mar 09, 2025   0007 Persistent nonbloody vomiting, minimal PO intake, body aches. Generally worse over the last 36-48 hours. Recently had been prescribed bactrim, completed her course.   Denies chest pain, dyspnea, abdominal pain, flank pain, fever, urinary changes  Lives with daughter in law and teenage grandchildren, son lives down the road. He is concerned that she is not receiving sufficient care at home    MM dry  Lungs CTAB, RRR  Abdomen soft nonttp, no CVAT  Cap refill >3s [CB]   0023 CBC and Auto Differential(!)  No acute leukocytosis, leukopenia, thrombocytosis, thrombocytopenia, or anemia [CB]   0042 Comprehensive Metabolic Panel(!)  ULI, elevated magnesium level, no significant additional acute electrolyte or hepatic abnormality [CB]   0043 Lactate: 1.3  No malperfusion [CB]   0120 Coronavirus 2019, PCR: Not Detected [CB]   0120 Flu B Result: Not Detected [CB]   0120 Flu A Result: Not Detected [CB]   0146 Urinalysis with Reflex Culture and Microscopic(!)  No bacteria nitrite or leukocyte esterase to suggest UTI [CB]   0347 CT abdomen pelvis wo IV contrast  No acute abdominal or pelvic process. [CB]   0439 On reassessment remains normotensive [CB]      ED Course User Index  [CB] Mauricio Perez DO         Diagnoses as of 03/09/25 2222   Dehydration   ULI (acute kidney injury) (CMS-HCC)   Intractable nausea and vomiting                 No data recorded     Carina Coma Scale Score: 15 (03/08/25 2248 : Keerthi  MUNIR Salgado)                           Medical Decision Making  Patient seen and examined, labs concerning for acute kidney injury which was likely secondary to the fluid losses given her vomiting and diarrhea.  Patient is covered empirically with Rocephin given her recent urine tract infection.  Will start the patient on IV fluids.  Will admit the patient to medicine team.  Will obtain CT abdomen pelvis without IV contrast dye.    CT abdomen pelvis revealed no acute abdominal or pelvic process.  Patient is admitted in stable condition.      I saw and evaluated the patient. I personally obtained the key and critical portions of the history and physical exam or was physically present for key and critical portions performed by the resident/fellow. I reviewed the resident/fellow's documentation and discussed the patient with the resident/fellow. I agree with the resident/fellow's medical decision making as documented in the note.    Liu Clark, DO         Procedure  Procedures     Liu Clark,   03/09/25 0603       Liu Clark,   03/09/25 9747

## 2025-03-09 NOTE — PROGRESS NOTES
"Physical Therapy    Physical Therapy Evaluation    Patient Name: Norma Villasenor  MRN: [id number]  Department: [facility] 3 S OBS  Room: [facility]4/3134-A  Today's Date: 3/9/2025   Time Calculation  Start Time: 0958  Stop Time: 1012  Time Calculation (min): 14 min    Assessment/Plan   PT Assessment  PT Assessment Results: Decreased endurance, Decreased strength, Impaired balance, Decreased mobility  Rehab Prognosis: Good  Medical Staff Made Aware: Yes  End of Session Communication: Bedside nurse  Assessment Comment: Pt's impairments include generalized weakness, impaired balance and decreased activity tolerance. Pt's functional limitations include bed mobility, transfers, gait and elevations. Pt would benefit from continued acute care PT during hospital LOS and upon discharge at a low level intensity. Pt owns cane and walker.  End of Session Patient Position: Up in chair, Alarm on  IP OR SWING BED PT PLAN  Inpatient or Swing Bed: Inpatient  PT Plan  Treatment/Interventions: Bed mobility, Transfer training, Gait training, Stair training, Balance training, Neuromuscular re-education, Strengthening, Endurance training, Range of motion, Therapeutic exercise, Therapeutic activity, Home exercise program, Positioning, Postural re-education  PT Plan: Ongoing PT  PT Frequency: 3 times per week  PT Discharge Recommendations: Low intensity level of continued care  Equipment Recommended upon Discharge: Wheeled walker  PT Recommended Transfer Status: Contact guard  PT - OK to Discharge: Yes (Pt ok to dc from acute care PT to next level of care once cleared by medical team.)    Subjective   General Visit Information:  General  Reason for Referral: Per HPI: \"84 YOF PMH HTN presenting to [facility] ED C/O generalized weakness multiple episodes of nonbloody emesis.     Was discharged on 2/11 for hypoxic respiratory failure secondary to influenza and had an ULI.  She was seen in the ED on 2/27 for uncomplicated UTI and started on Bactrim and discharged " "home.  2 days ago she developed intractable nausea and vomiting. Denies fever, chills, night sweats, HA, blurry vision, dysphagia/odynphagia, productive cough, PEREZ, orthopnea, chest pain, palpitations, abdominal pain, diarrhea, hematochezia/melena or swelling of lower extremities\"  Referred By: Tan Mackenzie MD  Past Medical History Relevant to Rehab:   Past Medical History:   Diagnosis Date    GERD (gastroesophageal reflux disease)     Hypertension        Family/Caregiver Present: No  Co-Treatment: OT  Co-Treatment Reason: dc plan  Prior to Session Communication: Bedside nurse  Patient Position Received: Bed, 3 rail up, Alarm on  General Comment: Pt agreeable to PT assessment. [place].  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 Does not currently use a cane or walker for mobility. IND with ADLs. No recent falls.  Precautions:  Precautions  Medical Precautions: Fall precautions  Precautions Comment: Nausea, dry heaving this am per nursing.  Objective   Pain:  Pain Assessment  Pain Assessment: 0-10  0-10 (Numeric) Pain Score: 0 - No pain  Cognition:  Cognition  Overall Cognitive Status: Within Functional Limits    General Assessments:  Activity Tolerance  Endurance: Decreased tolerance for upright activites (Fatigue)    Sensation  Light Touch: No apparent deficits    Static Sitting Balance  Static Sitting-Comment/Number of Minutes: SBA  Dynamic Sitting Balance  Dynamic Sitting-Comments: SBA to scoot    Static Standing Balance  Static Standing-Comment/Number of Minutes: SBA without UE support.  Dynamic Standing Balance  Dynamic " Standing-Comments: CGA with intermittent HHA to amb. No pathway deviation or overt LOB episodes. Decreased gait speed.  Functional Assessments:  Bed Mobility  Bed Mobility: Yes  Bed Mobility 1  Bed Mobility 1: Supine to sitting  Bed Mobility Comments 1: SBA to exit bed on R.    Transfers  Transfer: Yes  Transfer 1  Technique 1: Sit to stand, Stand to sit  Trials/Comments 1: x1 STS from EOB, CGAx1. x1 STS from bathroom commode, SBAx1.    Ambulation/Gait Training  Ambulation/Gait Training Performed: Yes  Ambulation/Gait Training 1  Surface 1: Level tile  Comments/Distance (ft) 1: Pt amb in room 2x12' without AD, SBA-CGAx1 with reciprocal gait, equal step length, decreased gait speed, no pathway deviation or overt LOB episodes.  Extremity/Trunk Assessments:  RLE   RLE : Within Functional Limits  LLE   LLE : Within Functional Limits  Outcome Measures:  [facility] Basic Mobility  Turning from your back to your side while in a flat bed without using bedrails: None  Moving from lying on your back to sitting on the side of a flat bed without using bedrails: A little  Moving to and from bed to chair (including a wheelchair): A little  Standing up from a chair using your arms (e.g. wheelchair or bedside chair): A little  To walk in hospital room: A little  Climbing 3-5 steps with railing: Total  Basic Mobility - Total Score: 17    Encounter Problems       Encounter Problems (Active)       PT Problem       Bed mobility (Progressing)       Start:  03/09/25    Expected End:  03/23/25       Pt will perform supine<>sit with HOB flat, TIA.           Transfers (Progressing)       Start:  03/09/25    Expected End:  03/23/25       Pt will perform all transfers with LRAD, TIA.            Gait (Progressing)       Start:  03/09/25    Expected End:  03/23/25       Pt will amb 150' with LRAD, TIA with reciprocal gait, upright posture and improved activity tolerance as demonstrated by vitals.           Stairs (Progressing)       Start:   03/09/25    Expected End:  03/23/25       Pt will ascend/descend 10 steps with LRAD, SBAx1.           Balance (Progressing)       Start:  03/09/25    Expected End:  03/23/25       Pt will tolerate standing x2 min without UE support, SBAx1 during dynamic balance challenges.              Pain - Adult              Education Documentation  Mobility Training, taught by Briana Nieto, PT at 3/9/2025 12:57 PM.  Learner: Patient  Readiness: Acceptance  Method: Explanation  Response: Verbalizes Understanding, Demonstrated Understanding    Education Comments  No comments found.

## 2025-03-09 NOTE — CARE PLAN
The patient's goals for the shift include      The clinical goals for the shift include Pt's nausea will be controlled throughout the shift.    Over the shift, the patient did not make progress toward the following goals. Barriers to progression include . Recommendations to address these barriers include .

## 2025-03-09 NOTE — H&P
History Of Present Illness  84 YOF PMH HTN presenting to [facility] ED C/O generalized weakness multiple episodes of nonbloody emesis.    Was discharged on 2/11 for hypoxic respiratory failure secondary to influenza and had an ULI.  She was seen in the ED on 2/27 for uncomplicated UTI and started on Bactrim and discharged home.  2 days ago she developed intractable nausea and vomiting. Denies fever, chills, night sweats, HA, blurry vision, dysphagia/odynphagia, productive cough, PEREZ, orthopnea, chest pain, palpitations, abdominal pain, diarrhea, hematochezia/melena or swelling of lower extremities      In the ED: AF HDS & SpO2 WNL on RA.  CBC notably negative for leukocytosis.  CMP notable for ULI with serum creatinine of 2.4 (baseline 1.0).  CT abdomen/pelvis negative for acute intra-abdominal pathology.  Urinalysis unremarkable.  Given IVF, antiemetic and Rocephin.    Past Medical History  She has a past medical history of GERD (gastroesophageal reflux disease) and Hypertension.    Surgical History  She has a past surgical history that includes Other surgical history; Coronary stent placement; Colonoscopy; Upper gastrointestinal endoscopy; and Cholecystectomy.     Social History  She reports that she has never smoked. She has been exposed to tobacco smoke. She has never used smokeless tobacco. She reports that she does not currently use alcohol. She reports that she does not use drugs.    Family History  No family history on file.     Allergies  Patient has no known allergies.    Review of Systems    12 pt ROS obtained: positives & pertinent negatives listed in HPI   Physical Exam   Constitutional: A&Ox4, NAD, resting comfortable   Head and Face: Atraumatic, normocephalic   Eyes: Normal external exam, EOMI  ENT: Normal external inspection of ears and nose. Oropharynx normal.  Cardiovascular: RRR, S1/S2, no murmurs, rubs, or gallops, radial pulses +2  Pulmonary: CTAB, no respiratory distress, no wheezing, rales or  rhonchi, on RA  Abdomen: +BS, soft, non-tender, nondistended, no guarding rigidity or rebound tenderness, no masses noted  MSK: Negative for edema, No joint swelling, normal movements of all extremities.   Neuro: No focal deficits, normal motor function, normal sensation, follows all commands  Skin- No lesions, contusions, or erythema. Delayed Cap refill, decreased turgor   Psychiatric: Judgment intact. Appropriate mood, affect and behavior   Last Recorded Vitals  /64 (BP Location: Right arm, Patient Position: Sitting)   Pulse 77   Temp 36.4 °C (97.5 °F)   Resp 16   Wt 45.4 kg (100 lb)   SpO2 98%     Relevant Results  Results for orders placed or performed during the hospital encounter of 03/08/25 (from the past 24 hours)   Magnesium   Result Value Ref Range    Magnesium 2.70 (H) 1.60 - 2.40 mg/dL   CBC and Auto Differential   Result Value Ref Range    WBC 8.9 4.4 - 11.3 x10*3/uL    nRBC 0.0 0.0 - 0.0 /100 WBCs    RBC 5.11 4.00 - 5.20 x10*6/uL    Hemoglobin 15.1 12.0 - 16.0 g/dL    Hematocrit 46.9 (H) 36.0 - 46.0 %    MCV 92 80 - 100 fL    MCH 29.5 26.0 - 34.0 pg    MCHC 32.2 32.0 - 36.0 g/dL    RDW 13.9 11.5 - 14.5 %    Platelets 361 150 - 450 x10*3/uL    Neutrophils % 71.8 40.0 - 80.0 %    Immature Granulocytes %, Automated 0.7 0.0 - 0.9 %    Lymphocytes % 17.9 13.0 - 44.0 %    Monocytes % 9.0 2.0 - 10.0 %    Eosinophils % 0.1 0.0 - 6.0 %    Basophils % 0.5 0.0 - 2.0 %    Neutrophils Absolute 6.36 (H) 1.60 - 5.50 x10*3/uL    Immature Granulocytes Absolute, Automated 0.06 0.00 - 0.50 x10*3/uL    Lymphocytes Absolute 1.58 0.80 - 3.00 x10*3/uL    Monocytes Absolute 0.80 0.05 - 0.80 x10*3/uL    Eosinophils Absolute 0.01 0.00 - 0.40 x10*3/uL    Basophils Absolute 0.04 0.00 - 0.10 x10*3/uL   Comprehensive Metabolic Panel   Result Value Ref Range    Glucose 122 (H) 74 - 99 mg/dL    Sodium 138 136 - 145 mmol/L    Potassium 4.3 3.5 - 5.3 mmol/L    Chloride 105 98 - 107 mmol/L    Bicarbonate 20 (L) 21 - 32 mmol/L     Anion Gap 17 10 - 20 mmol/L    Urea Nitrogen 57 (H) 6 - 23 mg/dL    Creatinine 2.40 (H) 0.50 - 1.05 mg/dL    eGFR 20 (L) >60 mL/min/1.73m*2    Calcium 10.4 (H) 8.6 - 10.3 mg/dL    Albumin 4.3 3.4 - 5.0 g/dL    Alkaline Phosphatase 50 33 - 136 U/L    Total Protein 8.2 6.4 - 8.2 g/dL    AST 21 9 - 39 U/L    Bilirubin, Total 0.4 0.0 - 1.2 mg/dL    ALT 14 7 - 45 U/L   Lactate   Result Value Ref Range    Lactate 1.3 0.4 - 2.0 mmol/L   Phosphorus   Result Value Ref Range    Phosphorus 3.5 2.5 - 4.9 mg/dL   Sars-CoV-2 and Influenza A/B PCR   Result Value Ref Range    Flu A Result Not Detected Not Detected    Flu B Result Not Detected Not Detected    Coronavirus 2019, PCR Not Detected Not Detected   Urinalysis with Reflex Culture and Microscopic   Result Value Ref Range    Color, Urine Yellow Light-Yellow, Yellow, Dark-Yellow    Appearance, Urine Turbid (N) Clear    Specific Gravity, Urine 1.017 1.005 - 1.035    pH, Urine 5.0 5.0, 5.5, 6.0, 6.5, 7.0, 7.5, 8.0    Protein, Urine 20 (TRACE) NEGATIVE, 10 (TRACE), 20 (TRACE) mg/dL    Glucose, Urine Normal Normal mg/dL    Blood, Urine NEGATIVE NEGATIVE mg/dL    Ketones, Urine NEGATIVE NEGATIVE mg/dL    Bilirubin, Urine 0.5 (1+) (A) NEGATIVE mg/dL    Urobilinogen, Urine Normal Normal mg/dL    Nitrite, Urine NEGATIVE NEGATIVE    Leukocyte Esterase, Urine NEGATIVE NEGATIVE   Urinalysis Microscopic   Result Value Ref Range    WBC, Urine 1-5 1-5, NONE /HPF    RBC, Urine NONE NONE, 1-2, 3-5 /HPF    Squamous Epithelial Cells, Urine 10-25 (FEW) Reference range not established. /HPF    Mucus, Urine FEW Reference range not established. /LPF    Hyaline Casts, Urine 3+ (A) NONE /LPF      Assessment/Plan   Assessment & Plan  Intractable nausea and vomiting  ULI with prerenal etiology   Hx of HTN    84 YOF PMH HTN presenting to [facility] ED C/O generalized weakness multiple episodes of nonbloody emesis.  In the ED: AF HDS & SpO2 WNL on RA.  CBC notably negative for leukocytosis.  CMP notable for  ULI with serum creatinine of 2.4 (baseline 1.0).  CT abdomen/pelvis negative for acute intra-abdominal pathology.  Urinalysis unremarkable.  Given IVF, antiemetic and Rocephin.    - Continue IVF and prn antiemetics  - Trial CLD as tolerated  - No indication for ABX at this time   - PT/OT    IVF: LR  DVT Ppx: eliquis  Diet: CLD  Code Status: FULL CODE    Complexity moderate, anticipate 1-2 midnight stays.           Sean Garzon, DO

## 2025-03-09 NOTE — PROGRESS NOTES
Occupational Therapy    Evaluation    Patient Name: Norma Villasenor  MRN: [id number]  Department: [facility] 3 S OBS  Room: 3134/3134-A  Today's Date: 3/9/2025       Assessment  IP OT Assessment  OT Assessment: Patient would benefit from additional skilled rehab at a low intensity to fully maximize independence in adls, activity tolerance for adls, and functional mobility tasks for adls.  Prognosis: Good  Evaluation/Treatment Tolerance: Patient limited by fatigue  End of Session Communication: Bedside nurse  End of Session Patient Position: Up in chair, Alarm on  Plan:  Treatment Interventions: ADL retraining, Functional transfer training  OT Frequency: 3 times per week  OT Discharge Recommendations: Low intensity level of continued care  OT - OK to Discharge: Yes (to next level of care when cleared by medical team.)    Subjective   Current Problem:  1. Dehydration        2. ULI (acute kidney injury) (CMS-HCC)          General:  General  Reason for Referral: activties of daily living  Referred By: Gurdeep  Past Medical History Relevant to Rehab: Patient admitted with weakness, vomitting, dehydration. PMH: GERD, HTN, kae  Co-Treatment: PT  Co-Treatment Reason: dc plan  Prior to Session Communication: Bedside nurse  Patient Position Received: Bed, 3 rail up, Alarm on  General Comment: Agreeable to participate  Precautions:  Medical Precautions: Fall precautions  Precautions Comment: Nursing reports that patient was dry heaving earlier this am.           Pain:  Pain Assessment  Pain Assessment: 0-10  0-10 (Numeric) Pain Score: 0 - No pain    Objective   Cognition:  Overall Cognitive Status: Within Functional Limits           Home Living:  Type of Home: House  Lives With: Adult children  Home Living Comments: Split level home, 10 steps inside, tub shower   Prior Function:  Prior Function Comments: Independent with adls, shares home management with family. Family appears to complete driving and shopping pta. Owns rolling walker,  does not use.  IADL History:     ADL:  Eating Assistance: Stand by  Grooming Assistance: Stand by  Bathing Assistance: Stand by  UE Dressing Assistance: Stand by  LE Dressing Assistance: Stand by  Activity Tolerance:  Endurance: Tolerates less than 10 min exercise, no significant change in vital signs  Bed Mobility/Transfers: Bed Mobility  Bed Mobility: Yes (CGA sup to sit eob. CGA sit to stand. CGA amb w/gait belt only in room to commode and sit on commode. SBA for commode and hygiene. CGA wash hands at sink in room. CGA amb to bedside chair and sit in chair. In chair at end, check on.)       Sensation:  Light Touch: No apparent deficits  Strength:  Strength Comments: B UE strength functionally assessed, appears at least 3+/5 overall.  Perception:     Coordination:  Movements are Fluid and Coordinated: Yes   Hand Function:  Hand Function  Gross Grasp: Functional    Outcome Measures: [facility] Daily Activity  Putting on and taking off regular lower body clothing: A little  Bathing (including washing, rinsing, drying): A little  Putting on and taking off regular upper body clothing: A little  Toileting, which includes using toilet, bedpan or urinal: A little  Taking care of personal grooming such as brushing teeth: A little  Eating Meals: A little  Daily Activity - Total Score: 18         and    Education Documentation  No documentation found.  Education Comments  No comments found.      Goals:   Encounter Problems       Encounter Problems (Active)       OT Goals       Patient will complete UE adls with MOD I (Progressing)       Start:  03/09/25    Expected End:  03/23/25            Patient will complete LE adls with MOD I (Progressing)       Start:  03/09/25    Expected End:  03/23/25            Patient will complete transfers with MOD I (Progressing)       Start:  03/09/25    Expected End:  03/23/25            Patient will complete functional mobility tasks with MOD I (Progressing)       Start:  03/09/25    Expected  End:  03/23/25

## 2025-03-09 NOTE — CARE PLAN
Problem: Safety - Adult  Goal: Free from fall injury  Outcome: Progressing  Flowsheets (Taken 3/9/2025 1131)  Free from fall injury: Instruct family/caregiver on patient safety     Problem: Discharge Planning  Goal: Discharge to home or other facility with appropriate resources  Outcome: Progressing  Flowsheets (Taken 3/9/2025 1131)  Discharge to home or other facility with appropriate resources:   Identify barriers to discharge with patient and caregiver   Arrange for needed discharge resources and transportation as appropriate   Identify discharge learning needs (meds, wound care, etc)   The patient's goals for the shift include      The clinical goals for the shift include completed

## 2025-03-10 VITALS
HEIGHT: 58 IN | BODY MASS INDEX: 21.51 KG/M2 | SYSTOLIC BLOOD PRESSURE: 110 MMHG | OXYGEN SATURATION: 94 % | DIASTOLIC BLOOD PRESSURE: 61 MMHG | RESPIRATION RATE: 16 BRPM | HEART RATE: 56 BPM | WEIGHT: 102.5 LBS | TEMPERATURE: 97 F

## 2025-03-10 LAB
ALBUMIN SERPL BCP-MCNC: 3.1 G/DL (ref 3.4–5)
ANION GAP SERPL CALC-SCNC: 9 MMOL/L (ref 10–20)
BUN SERPL-MCNC: 30 MG/DL (ref 6–23)
CALCIUM SERPL-MCNC: 8.8 MG/DL (ref 8.6–10.3)
CHLORIDE SERPL-SCNC: 107 MMOL/L (ref 98–107)
CO2 SERPL-SCNC: 25 MMOL/L (ref 21–32)
CREAT SERPL-MCNC: 1.25 MG/DL (ref 0.5–1.05)
EGFRCR SERPLBLD CKD-EPI 2021: 43 ML/MIN/1.73M*2
ERYTHROCYTE [DISTWIDTH] IN BLOOD BY AUTOMATED COUNT: 14 % (ref 11.5–14.5)
GLUCOSE SERPL-MCNC: 89 MG/DL (ref 74–99)
HCT VFR BLD AUTO: 32.5 % (ref 36–46)
HGB BLD-MCNC: 10.7 G/DL (ref 12–16)
MAGNESIUM SERPL-MCNC: 2.13 MG/DL (ref 1.6–2.4)
MCH RBC QN AUTO: 29.9 PG (ref 26–34)
MCHC RBC AUTO-ENTMCNC: 32.9 G/DL (ref 32–36)
MCV RBC AUTO: 91 FL (ref 80–100)
NRBC BLD-RTO: 0 /100 WBCS (ref 0–0)
PHOSPHATE SERPL-MCNC: 2.2 MG/DL (ref 2.5–4.9)
PLATELET # BLD AUTO: 228 X10*3/UL (ref 150–450)
POTASSIUM SERPL-SCNC: 3.9 MMOL/L (ref 3.5–5.3)
RBC # BLD AUTO: 3.58 X10*6/UL (ref 4–5.2)
SODIUM SERPL-SCNC: 137 MMOL/L (ref 136–145)
WBC # BLD AUTO: 6.4 X10*3/UL (ref 4.4–11.3)

## 2025-03-10 PROCEDURE — 2500000004 HC RX 250 GENERAL PHARMACY W/ HCPCS (ALT 636 FOR OP/ED): Performed by: INTERNAL MEDICINE

## 2025-03-10 PROCEDURE — 36415 COLL VENOUS BLD VENIPUNCTURE: CPT

## 2025-03-10 PROCEDURE — 99232 SBSQ HOSP IP/OBS MODERATE 35: CPT | Performed by: INTERNAL MEDICINE

## 2025-03-10 PROCEDURE — 2500000002 HC RX 250 W HCPCS SELF ADMINISTERED DRUGS (ALT 637 FOR MEDICARE OP, ALT 636 FOR OP/ED): Performed by: INTERNAL MEDICINE

## 2025-03-10 PROCEDURE — 1100000001 HC PRIVATE ROOM DAILY

## 2025-03-10 PROCEDURE — 80069 RENAL FUNCTION PANEL: CPT

## 2025-03-10 PROCEDURE — 2500000001 HC RX 250 WO HCPCS SELF ADMINISTERED DRUGS (ALT 637 FOR MEDICARE OP): Performed by: INTERNAL MEDICINE

## 2025-03-10 PROCEDURE — 97535 SELF CARE MNGMENT TRAINING: CPT | Mod: GO,CO

## 2025-03-10 PROCEDURE — 83735 ASSAY OF MAGNESIUM: CPT

## 2025-03-10 PROCEDURE — 97530 THERAPEUTIC ACTIVITIES: CPT | Mod: GO,CO

## 2025-03-10 PROCEDURE — 85027 COMPLETE CBC AUTOMATED: CPT

## 2025-03-10 RX ORDER — LOPERAMIDE HYDROCHLORIDE 2 MG/1
2 CAPSULE ORAL 4 TIMES DAILY PRN
Status: DISCONTINUED | OUTPATIENT
Start: 2025-03-10 | End: 2025-03-13 | Stop reason: HOSPADM

## 2025-03-10 RX ORDER — LOPERAMIDE HYDROCHLORIDE 2 MG/1
4 CAPSULE ORAL ONCE
Status: COMPLETED | OUTPATIENT
Start: 2025-03-10 | End: 2025-03-10

## 2025-03-10 RX ORDER — SODIUM CHLORIDE, SODIUM LACTATE, POTASSIUM CHLORIDE, CALCIUM CHLORIDE 600; 310; 30; 20 MG/100ML; MG/100ML; MG/100ML; MG/100ML
50 INJECTION, SOLUTION INTRAVENOUS CONTINUOUS
Status: ACTIVE | OUTPATIENT
Start: 2025-03-10 | End: 2025-03-10

## 2025-03-10 RX ADMIN — APIXABAN 2.5 MG: 2.5 TABLET, FILM COATED ORAL at 02:00

## 2025-03-10 RX ADMIN — APIXABAN 2.5 MG: 2.5 TABLET, FILM COATED ORAL at 14:14

## 2025-03-10 RX ADMIN — PANTOPRAZOLE SODIUM 40 MG: 40 TABLET, DELAYED RELEASE ORAL at 08:27

## 2025-03-10 RX ADMIN — DRONEDARONE 400 MG: 400 TABLET, FILM COATED ORAL at 21:04

## 2025-03-10 RX ADMIN — GABAPENTIN 100 MG: 100 CAPSULE ORAL at 21:05

## 2025-03-10 RX ADMIN — DRONEDARONE 400 MG: 400 TABLET, FILM COATED ORAL at 11:40

## 2025-03-10 RX ADMIN — AMLODIPINE BESYLATE 5 MG: 5 TABLET ORAL at 11:40

## 2025-03-10 RX ADMIN — LOPERAMIDE HYDROCHLORIDE 4 MG: 2 CAPSULE ORAL at 14:14

## 2025-03-10 RX ADMIN — METOPROLOL SUCCINATE 50 MG: 50 TABLET, EXTENDED RELEASE ORAL at 21:04

## 2025-03-10 RX ADMIN — POTASSIUM CHLORIDE 20 MEQ: 1500 TABLET, EXTENDED RELEASE ORAL at 11:40

## 2025-03-10 RX ADMIN — ROSUVASTATIN CALCIUM 5 MG: 5 TABLET, FILM COATED ORAL at 21:05

## 2025-03-10 RX ADMIN — ASPIRIN 81 MG: 81 TABLET, COATED ORAL at 11:40

## 2025-03-10 RX ADMIN — TIZANIDINE 2 MG: 2 TABLET ORAL at 21:04

## 2025-03-10 RX ADMIN — SODIUM CHLORIDE, POTASSIUM CHLORIDE, SODIUM LACTATE AND CALCIUM CHLORIDE 50 ML/HR: 600; 310; 30; 20 INJECTION, SOLUTION INTRAVENOUS at 11:40

## 2025-03-10 RX ADMIN — ALLOPURINOL 100 MG: 100 TABLET ORAL at 11:40

## 2025-03-10 RX ADMIN — PROCHLORPERAZINE EDISYLATE 10 MG: 5 INJECTION INTRAMUSCULAR; INTRAVENOUS at 08:27

## 2025-03-10 ASSESSMENT — COGNITIVE AND FUNCTIONAL STATUS - GENERAL
WALKING IN HOSPITAL ROOM: A LITTLE
DAILY ACTIVITIY SCORE: 20
DRESSING REGULAR LOWER BODY CLOTHING: A LITTLE
HELP NEEDED FOR BATHING: A LITTLE
DRESSING REGULAR UPPER BODY CLOTHING: A LITTLE
DAILY ACTIVITIY SCORE: 20
STANDING UP FROM CHAIR USING ARMS: A LITTLE
DRESSING REGULAR UPPER BODY CLOTHING: A LITTLE
HELP NEEDED FOR BATHING: A LITTLE
MOVING FROM LYING ON BACK TO SITTING ON SIDE OF FLAT BED WITH BEDRAILS: A LITTLE
MOVING TO AND FROM BED TO CHAIR: A LITTLE
TOILETING: A LITTLE
TURNING FROM BACK TO SIDE WHILE IN FLAT BAD: A LITTLE
MOBILITY SCORE: 18
TOILETING: A LITTLE
DRESSING REGULAR LOWER BODY CLOTHING: A LITTLE
CLIMB 3 TO 5 STEPS WITH RAILING: A LITTLE

## 2025-03-10 ASSESSMENT — ACTIVITIES OF DAILY LIVING (ADL)
LACK_OF_TRANSPORTATION: NO
BATHING_WHERE_ASSESSED: EDGE OF BED
HOME_MANAGEMENT_TIME_ENTRY: 15
BATHING_COMMENTS: WITH WIPES
BATHING_LEVEL_OF_ASSISTANCE: MINIMUM ASSISTANCE;MODERATE ASSISTANCE

## 2025-03-10 ASSESSMENT — PAIN - FUNCTIONAL ASSESSMENT: PAIN_FUNCTIONAL_ASSESSMENT: 0-10

## 2025-03-10 ASSESSMENT — PAIN SCALES - GENERAL
PAINLEVEL_OUTOF10: 0 - NO PAIN
PAINLEVEL_OUTOF10: 0 - NO PAIN

## 2025-03-10 NOTE — PROGRESS NOTES
Norma Villasenor is a 84 y.o. female on day 1 of admission presenting with Intractable nausea and vomiting.    Subjective   Patient noted that she was feeling a lot better today, she denied any fevers or chills, she said her nausea was improved although that she was actually having an episode of diarrhea this morning and ask for Imodium from the nurse; she said that she had episodes of diarrhea a couple days prior to her presentation in the hospital although it had not been an issue since she had been here.  She is tolerating her liquid diet and was looking to advance a little bit as she had somewhat of an appetite today which was encouraging to her.  She still felt tired overall, but said that the abdominal pain and nausea had improved as mentioned above.    Objective     Physical Exam  Vitals reviewed.   Constitutional:       General: She is not in acute distress.     Appearance: Normal appearance. She is not ill-appearing.   HENT:      Head: Normocephalic and atraumatic.      Nose: Nose normal.      Mouth/Throat:      Mouth: Mucous membranes are moist.   Eyes:      General: No scleral icterus.  Cardiovascular:      Rate and Rhythm: Normal rate and regular rhythm.      Pulses: Normal pulses.      Heart sounds: Normal heart sounds. No murmur heard.     No friction rub. No gallop.   Pulmonary:      Effort: Pulmonary effort is normal. No respiratory distress.      Breath sounds: Normal breath sounds. No wheezing, rhonchi or rales.   Abdominal:      General: Abdomen is flat. Bowel sounds are normal. There is no distension.      Palpations: Abdomen is soft. There is no mass.      Tenderness: There is no abdominal tenderness. There is no guarding or rebound.   Musculoskeletal:         General: No swelling, tenderness or signs of injury.      Right lower leg: No edema.      Left lower leg: No edema.   Skin:     General: Skin is warm and dry.      Coloration: Skin is not jaundiced or pale.      Findings: No bruising,  "erythema, lesion or rash.   Neurological:      Mental Status: She is alert and oriented to person, place, and time. Mental status is at baseline.      Motor: Weakness present.   Psychiatric:         Mood and Affect: Mood normal.         Behavior: Behavior normal.         Thought Content: Thought content normal.         Judgment: Judgment normal.           Last Recorded Vitals  Blood pressure 120/60, pulse 56, temperature 36.5 °C (97.7 °F), temperature source Temporal, resp. rate 17, height 1.473 m (4' 9.99\"), weight 46.5 kg (102 lb 8 oz), SpO2 97%.  Intake/Output last 3 Shifts:  I/O last 3 completed shifts:  In: 1860 (40 mL/kg) [P.O.:680; I.V.:1180 (25.4 mL/kg)]  Out: - (0 mL/kg)   Weight: 46.5 kg     Relevant Results  Scheduled medications  allopurinol, 100 mg, oral, Daily  amLODIPine, 5 mg, oral, Daily  apixaban, 2.5 mg, oral, q12h  aspirin, 81 mg, oral, Daily  budesonide, 0.5 mg, nebulization, BID  dronedarone, 400 mg, oral, BID  formoterol, 20 mcg, nebulization, BID  gabapentin, 100 mg, oral, Nightly  metoprolol succinate XL, 50 mg, oral, Nightly  pantoprazole, 40 mg, oral, Daily  potassium chloride CR, 20 mEq, oral, Daily  rosuvastatin, 5 mg, oral, Nightly  tiZANidine, 2 mg, oral, Nightly      Continuous medications  lactated Ringer's, 50 mL/hr, Last Rate: 50 mL/hr (03/10/25 1151)      PRN medications  PRN medications: benzocaine-menthol, dicyclomine, loperamide, meclizine, ondansetron **OR** ondansetron, prochlorperazine    Results for orders placed or performed during the hospital encounter of 03/08/25 (from the past 24 hours)   CBC   Result Value Ref Range    WBC 6.4 4.4 - 11.3 x10*3/uL    nRBC 0.0 0.0 - 0.0 /100 WBCs    RBC 3.58 (L) 4.00 - 5.20 x10*6/uL    Hemoglobin 10.7 (L) 12.0 - 16.0 g/dL    Hematocrit 32.5 (L) 36.0 - 46.0 %    MCV 91 80 - 100 fL    MCH 29.9 26.0 - 34.0 pg    MCHC 32.9 32.0 - 36.0 g/dL    RDW 14.0 11.5 - 14.5 %    Platelets 228 150 - 450 x10*3/uL   Magnesium   Result Value Ref Range    " Magnesium 2.13 1.60 - 2.40 mg/dL   Renal Function Panel   Result Value Ref Range    Glucose 89 74 - 99 mg/dL    Sodium 137 136 - 145 mmol/L    Potassium 3.9 3.5 - 5.3 mmol/L    Chloride 107 98 - 107 mmol/L    Bicarbonate 25 21 - 32 mmol/L    Anion Gap 9 (L) 10 - 20 mmol/L    Urea Nitrogen 30 (H) 6 - 23 mg/dL    Creatinine 1.25 (H) 0.50 - 1.05 mg/dL    eGFR 43 (L) >60 mL/min/1.73m*2    Calcium 8.8 8.6 - 10.3 mg/dL    Phosphorus 2.2 (L) 2.5 - 4.9 mg/dL    Albumin 3.1 (L) 3.4 - 5.0 g/dL     ECG 12 lead    Result Date: 3/10/2025  Normal sinus rhythm Nonspecific ST and T wave abnormality Abnormal ECG When compared with ECG of 28-FEB-2025 16:50, Premature atrial complexes are no longer Present Nonspecific T wave abnormality no longer evident in Anterior leads    CT abdomen pelvis wo IV contrast    Result Date: 3/9/2025  Interpreted By:  Floridalma Echeverria, STUDY: CT ABDOMEN PELVIS WO IV CONTRAST;  3/9/2025 3:29 am   INDICATION: Signs/Symptoms:ULI, persistent vomiting, recent UTI.   COMPARISON: 01/19/2023   ACCESSION NUMBER(S): [id number]   ORDERING CLINICIAN: JOANA DA SILVA   TECHNIQUE: Axial noncontrast CT images of the abdomen and pelvis with coronal and sagittal reconstructed images.   FINDINGS: LOWER CHEST: No acute abnormality of the lung bases. Severe coronary artery calcifications. small hiatal hernia. BONES: No acute osseous abnormality. Grade 1 anterolisthesis of L4 on L5, likely on a degenerative basis. Multilevel degenerative disc changes. Severe lower lumbar facet arthropathy. Severe left hip osteoarthrosis. ABDOMINAL WALL: Within normal limits.   ABDOMEN: Lack of intravenous contrast limits evaluation of vessels and solid organs. LIVER: 1.2 cm cyst in the caudate lobe. Additional subcentimeter hypodense lesion in the right lobe is too small to characterize, but likely a small cyst. BILE DUCTS: Stable mild extrahepatic biliary dilatation likely due to cholecystectomy changes. GALLBLADDER: Cholecystectomy.  PANCREAS: No peripancreatic inflammatory stranding or duct dilatation. SPLEEN: Within normal limits. ADRENALS: Within normal limits.   KIDNEYS, URETERS, URINARY BLADDER: No hydronephrosis or urinary tract calculus. Bilateral renal cysts measuring up to 2.3 cm on the right and 1.9 cm on the left. The urinary bladder is unremarkable.   VESSELS: No aortic aneurysm. RETROPERITONEUM: No pathologically enlarged lymph nodes.   PELVIS:   REPRODUCTIVE ORGANS: Calcified uterine fibroids. No significant free pelvic fluid.   BOWEL: No dilated bowel. Colonic diverticulosis without acute diverticulitis. Normal appendix. PERITONEUM: No ascites or free air, no fluid collection.       No acute abdominal or pelvic process.       MACRO: None   Signed by: Floridalma Echeverria 3/9/2025 3:43 AM Dictation workstation:   [id number]    ECG 12 lead    Result Date: 3/7/2025  Sinus rhythm with Premature atrial complexes Otherwise normal ECG When compared with ECG of 10-FEB-2025 09:23, Premature atrial complexes are now Present Confirmed by TORIN Hodge ([id number]) on 3/7/2025 5:07:38 PM    XR chest 1 view    Result Date: 2/28/2025  STUDY: Chest Radiograph;  2/28/2025 5:02PM INDICATION: Vomiting. COMPARISON: 2/7/2025 XR Chest. ACCESSION NUMBER(S): [id number] ORDERING CLINICIAN: MARK SUMMERS TECHNIQUE:  Frontal chest was obtained at 17:02 hours. FINDINGS: CARDIOMEDIASTINAL SILHOUETTE: Cardiomediastinal silhouette is normal in size and configuration.  LUNGS: Lungs are clear.  ABDOMEN: No remarkable upper abdominal findings.  BONES: No acute osseous changes.    No radiographic evidence of acute cardiopulmonary disease. Signed by Karri Matthews MD    Electrocardiogram, 12-lead PRN ACS symptoms    Result Date: 2/19/2025  Normal sinus rhythm Normal ECG When compared with ECG of 09-FEB-2025 10:41, T wave amplitude has decreased in Anterior leads Confirmed by Lexa Villeda ([id number]) on 2/19/2025 1:34:58 PM    Electrocardiogram, 12-lead PRN ACS symptoms    Result  Date: 2/19/2025  Normal sinus rhythm Normal ECG When compared with ECG of 09-FEB-2025 10:41, (unconfirmed) T wave amplitude has decreased in Anterior leads Confirmed by Lexa Villeda ([id number]) on 2/19/2025 1:34:55 PM    ECG 12 lead    Result Date: 2/19/2025  Suspect arm lead reversal, interpretation assumes no reversal Atrial fibrillation with rapid ventricular response Lateral infarct , age undetermined Inferior infarct , age undetermined Abnormal ECG When compared with ECG of 08-FEB-2025 12:14, (unconfirmed) Significant changes have occurred Confirmed by Lexa Villeda ([id number]) on 2/19/2025 12:34:05 PM    ECG 12 lead    Result Date: 2/19/2025  Sinus bradycardia with frequent and consecutive Premature ventricular complexes and Possible Premature atrial complexes with Aberrant conduction Abnormal ECG When compared with ECG of 08-FEB-2025 12:13, (unconfirmed) Fusion complexes are no longer Present Premature ventricular complexes are now Present Right bundle branch block is no longer Present Confirmed by Lexa Villeda ([id number]) on 2/19/2025 12:34:02 PM    ECG 12 lead    Result Date: 2/17/2025  Sinus tachycardia with Premature supraventricular complexes and Fusion complexes Right bundle branch block When compared with ECG of 08-FEB-2025 09:06, Sinus rhythm has replaced Atrial fibrillation Right bundle branch block is now Present Reconfirmed by Prabhjot Trejo ([id number]) on 2/17/2025 9:38:12 AM    Electrocardiogram, 12-lead PRN ACS symptoms    Result Date: 2/10/2025  Normal sinus rhythm Increased R/S ratio in V1, consider early transition or posterior infarct When compared with ECG of 08-FEB-2025 21:26, (unconfirmed) Significant changes have occurred Reconfirmed by Prabhjot Trejo ([id number]) on 2/10/2025 6:48:25 PM    Transthoracic Echo (TTE) Complete    Result Date: 2/10/2025            [facility] [place] [address] [facility], [facility] [id number]    Tel [phone] Fax [phone] TRANSTHORACIC ECHOCARDIOGRAM REPORT Patient Name:        ANNIA Wyatt Physician:    [id number] Lexa Villeda MD Study Date:         2/9/2025              Ordering Provider:    [id number] RICARDO MARCUS MRN/PID:            [id number]              Fellow: Accession#:         [id number]          Nurse: Date of Birth/Age:  1940 / 84 years Sonographer:          Figueroa Melo RDCS Gender Assigned at  F                     Additional Staff: Birth: Height:             149.00 cm             Admit Date: Weight:             53.00 kg              Admission Status: BSA / BMI:          1.46 m2 / 23.87 kg/m2 Department Location: Blood Pressure: 121 /60 mmHg Study Type:    TRANSTHORACIC ECHO (TTE) COMPLETE Diagnosis/ICD: Unspecified atrial fibrillation-I48.91 CPT Codes:     Echo Complete w Full Doppler-[id number]  Study Detail: The following Echo studies were performed: 2D, Doppler, M-Mode and               color flow.  PHYSICIAN INTERPRETATION: Left Ventricle: The left ventricular systolic function is normal, with a visually estimated ejection fraction of 60-65%. There is mild concentric left ventricular hypertrophy. There are no regional wall motion abnormalities. The left ventricular cavity size is normal. There is mild increased septal and mildly increased posterior left ventricular wall thickness. There is left ventricular concentric remodeling. Spectral Doppler shows a Grade I (impaired relaxation pattern) of left ventricular diastolic filling with normal left atrial filling pressure. Left Atrium: The left atrial size is moderate to severely dilated. Right Ventricle: The right ventricle is normal in size. There is normal right ventricular global systolic function. Right Atrium: The right atrial size is normal. Aortic Valve: The aortic valve is trileaflet. There is minimal aortic  valve cusp calcification. There is evidence of mildly elevated transaortic gradients consistent with sclerosis of the aortic valve. There is no evidence of aortic valve regurgitation. The peak instantaneous gradient of the aortic valve is 8 mmHg. Mitral Valve: The mitral valve is mildly thickened. There is mild to moderate mitral annular calcification. There is trace mitral valve regurgitation. Tricuspid Valve: The tricuspid valve is structurally normal. There is trace tricuspid regurgitation. The Doppler estimated RVSP is within normal limits at 26.4 mmHg. Pulmonic Valve: The pulmonic valve is structurally normal. There is no indication of pulmonic valve regurgitation. Pericardium: No pericardial effusion noted. Aorta: The aortic root is normal. In comparison to the previous echocardiogram(s): There are no prior studies on this patient for comparison purposes.  CONCLUSIONS:  1. The left ventricular systolic function is normal, with a visually estimated ejection fraction of 60-65%.  2. Spectral Doppler shows a Grade I (impaired relaxation pattern) of left ventricular diastolic filling with normal left atrial filling pressure.  3. The left atrial size is moderate to severely dilated.  4. Right ventricular systolic pressure is within normal limits.  5. Aortic valve sclerosis. QUANTITATIVE DATA SUMMARY:  2D MEASUREMENTS:             Normal Ranges: LAs:             4.20 cm     (2.7-4.0cm) IVSd:            1.00 cm     (0.6-1.1cm) LVPWd:           1.10 cm     (0.6-1.1cm) LVIDd:           3.80 cm     (3.9-5.9cm) LV Mass Index:   86.1 g/m2 LVEDV Index:     33.39 ml/m2  LEFT ATRIUM:                Normal Ranges: LA Volume Index: 27.0 ml/m2  M-MODE MEASUREMENTS:         Normal Ranges: Ao Root:             2.40 cm (2.0-3.7cm) AoV Exc:             1.60 cm (1.5-2.5cm)  AORTA MEASUREMENTS:         Normal Ranges: AoV Exc:            1.60 cm (1.5-2.5cm) Asc Ao, d:          2.80 cm (2.1-3.4cm)  LV SYSTOLIC FUNCTION:                       Normal Ranges: EF-A4C View:    56 % (>=55%) EF-A2C View:    68 % EF-Biplane:     61 % EF-Visual:      63 % LV EF Reported: 63 %  LV DIASTOLIC FUNCTION:           Normal Ranges: MV Peak E:             0.74 m/s  (0.7-1.2 m/s) MV Peak A:             1.03 m/s  (0.42-0.7 m/s) E/A Ratio:             0.72      (1.0-2.2) MV e'                  0.063 m/s (>8.0) MV lateral e'          0.06 m/s MV medial e'           0.07 m/s E/e' Ratio:            11.76     (<8.0)  MITRAL VALVE:          Normal Ranges: MV DT:        261 msec (150-240msec)  AORTIC VALVE:           Normal Ranges: AoV Vmax:      1.39 m/s (<=1.7m/s) AoV Peak P.7 mmHg (<20mmHg) LVOT Max Norm:  0.99 m/s (<=1.1m/s) LVOT Diameter: 1.90 cm  (1.8-2.4cm) AoV Area,Vmax: 2.02 cm2 (2.5-4.5cm2)  RIGHT VENTRICLE: RV Basal 3.65 cm RV Mid   2.84 cm RV Major 5.1 cm TAPSE:   17.2 mm RV s'    0.20 m/s  TRICUSPID VALVE/RVSP:          Normal Ranges: Peak TR Velocity:     2.42 m/s RV Syst Pressure:     26 mmHg  (< 30mmHg)  [id number] Lexa Villeda MD Electronically signed on 2/10/2025 at 8:13:52 AM  ** Final **          Assessment/Plan   This is a very pleasant 84-year-old lady known to writer from hospital admission 1 month prior for influenza complicated by ULI and atrial fibrillation that had all resolved, she presented to the emergency department a week prior with symptoms of acute cystitis, was diagnosed with a E. coli UTI and discharged appropriately on Bactrim, as the cultures from the urine did show sensitivity with Bactrim; she had intractable nausea and vomiting in the days after that, she came to the hospital and ultimately was admitted for observation and found to have a more severe ULI of prerenal etiology that is improving with IV fluid resuscitation, course complicated today by diarrhea that is responsive to as needed loperamide, anticipate she will need an additional hospital day, awaiting PT OT evaluations in anticipation for potential placement.  Assessment &  Plan  Intractable nausea and vomiting  ULI with prerenal etiology   Hx of HTN    Plan:  - Continue inpatient admission; no additional need for any anti-infectives  - Continue IV fluid resuscitation and continue to trend creatinine as it is steadily improving, near baseline  - Encourage p.o. intake, we will liberalize her diet from clear liquids to regular diet with low sodium  - Continue other medications as ordered, add as needed loperamide and give 4 mg now, may continue to use 2 mg with each additional episode of diarrhea  - Anticipate a need for discharge home with home health care including PT/OT, which should be tomorrow    Diet: Regular, low-sodium  VTE PPx: Eliquis  Code: Full           I spent 35 minutes in the professional and overall care of this patient.    Tan Mackenzie MD

## 2025-03-10 NOTE — CARE PLAN
The clinical goals for the shift include tolerate diet this shift      Problem: Pain - Adult  Goal: Verbalizes/displays adequate comfort level or baseline comfort level  Outcome: Progressing  Flowsheets (Taken 3/10/2025 1052)  Verbalizes/displays adequate comfort level or baseline comfort level:   Encourage patient to monitor pain and request assistance   Assess pain using appropriate pain scale   Administer analgesics based on type and severity of pain and evaluate response   Implement non-pharmacological measures as appropriate and evaluate response   Consider cultural and social influences on pain and pain management   Notify Licensed Independent Practitioner if interventions unsuccessful or patient reports new pain     Problem: Safety - Adult  Goal: Free from fall injury  Outcome: Progressing  Flowsheets (Taken 3/10/2025 1052)  Free from fall injury:   Instruct family/caregiver on patient safety   Based on caregiver fall risk screen, instruct family/caregiver to ask for assistance with transferring infant if caregiver noted to have fall risk factors     Problem: Discharge Planning  Goal: Discharge to home or other facility with appropriate resources  Outcome: Progressing  Flowsheets (Taken 3/10/2025 1052)  Discharge to home or other facility with appropriate resources:   Identify barriers to discharge with patient and caregiver   Arrange for needed discharge resources and transportation as appropriate   Identify discharge learning needs (meds, wound care, etc)   Arrange for interpreters to assist at discharge as needed   Refer to discharge planning if patient needs post-hospital services based on physician order or complex needs related to functional status, cognitive ability or social support system     Problem: Chronic Conditions and Co-morbidities  Goal: Patient's chronic conditions and co-morbidity symptoms are monitored and maintained or improved  Outcome: Progressing  Flowsheets (Taken 3/10/2025  1052)  Care Plan - Patient's Chronic Conditions and Co-Morbidity Symptoms are Monitored and Maintained or Improved:   Monitor and assess patient's chronic conditions and comorbid symptoms for stability, deterioration, or improvement   Collaborate with multidisciplinary team to address chronic and comorbid conditions and prevent exacerbation or deterioration   Update acute care plan with appropriate goals if chronic or comorbid symptoms are exacerbated and prevent overall improvement and discharge     Problem: Nutrition  Goal: Nutrient intake appropriate for maintaining nutritional needs  Outcome: Progressing     Problem: Skin  Goal: Participates in plan/prevention/treatment measures  Outcome: Progressing  Flowsheets (Taken 3/10/2025 1052)  Participates in plan/prevention/treatment measures:   Discuss with provider PT/OT consult   Elevate heels   Increase activity/out of bed for meals     Problem: Skin  Goal: Prevent/manage excess moisture  Outcome: Progressing  Flowsheets (Taken 3/10/2025 1052)  Prevent/manage excess moisture:   Cleanse incontinence/protect with barrier cream   Moisturize dry skin   Follow provider orders for dressing changes   Monitor for/manage infection if present     Problem: Skin  Goal: Prevent/minimize sheer/friction injuries  Outcome: Progressing  Flowsheets (Taken 3/10/2025 1052)  Prevent/minimize sheer/friction injuries:   Complete micro-shifts as needed if patient unable. Adjust patient position to relieve pressure points, not a full turn   Increase activity/out of bed for meals   Use pull sheet   HOB 30 degrees or less   Turn/reposition every 2 hours/use positioning/transfer devices     Problem: Skin  Goal: Promote/optimize nutrition  Outcome: Progressing  Flowsheets (Taken 3/10/2025 1052)  Promote/optimize nutrition:   Assist with feeding   Monitor/record intake including meals   Consume > 50% meals/supplements   Offer water/supplements/favorite foods   Discuss with provider if NPO > 2  days   Reassess MST if dietician not consulted     Problem: Skin  Goal: Promote skin healing  Outcome: Progressing  Flowsheets (Taken 3/10/2025 1052)  Promote skin healing:   Assess skin/pad under line(s)/device(s)   Protective dressings over bony prominences   Turn/reposition every 2 hours/use positioning/transfer devices

## 2025-03-10 NOTE — PROGRESS NOTES
Occupational Therapy    OT Treatment    Patient Name: Norma Villasenor  MRN: [id number]  Today's Date: 3/10/2025  Time Calculation  Start Time: 0827  Stop Time: 0856  Time Calculation (min): 29 min       3131/3131-A    Assessment:  OT Assessment: Pt required contact guard assist for all mobility this date, motivated to engage in therapy session  Prognosis: Good  Evaluation/Treatment Tolerance: Patient tolerated treatment well  End of Session Communication: Bedside nurse  End of Session Patient Position: Bed, 2 rail up, Alarm on  OT Assessment Results: Decreased ADL status, Decreased functional mobility  Prognosis: Good  Evaluation/Treatment Tolerance: Patient tolerated treatment well    Plan:  Treatment Interventions: ADL retraining, Functional transfer training  OT Frequency: 3 times per week  OT Discharge Recommendations: Low intensity level of continued care  Treatment Interventions: ADL retraining, Functional transfer training  Subjective        03/10/25 0827   OT Last Visit   OT Received On 03/10/25   General   Reason for Referral activties of daily living   Referred By Gurdeep   Prior to Session Communication Bedside nurse   Patient Position Received Bed, 2 rail up;Alarm on   Preferred Learning Style verbal;visual   General Comment Patient is agreeable to therapy session [place]   Precautions   LE Weight Bearing Status Weight Bearing as Tolerated   Medical Precautions Fall precautions   Post-Surgical Precautions Left total knee precautions   Pain Assessment   Pain Assessment 0-10   0-10 (Numeric) Pain Score 0 - No pain   Patient's Stated Pain Goal No pain   Cognition   Overall Cognitive Status WFL   RUE    RUE  WFL   LUE    LUE WFL   Grooming   Grooming Level of Assistance Setup   Grooming Where Assessed Edge of bed   Grooming Comments facial hygiene completed   LE Bathing   LE Bathing Level of Assistance Minimum assistance;Moderate assistance   LE Bathing Where Assessed Edge of bed   LE Bathing Comments with wipes    LE Dressing   LE Dressing Yes   Sock Level of Assistance Minimum assistance;Moderate assistance   LE Dressing Where Assessed Edge of bed   LE Dressing Comments to doff/too socks   Transfers   Transfer Yes   Transfer 1   Transfer From 1 Bed to   Transfer to 1 Stand   Technique 1 Sit to stand   Transfer Device 1   (hand held)   Transfer Level of Assistance 1 Hand held assistance   Trials/Comments 1 cueing for safety   Transfers 2   Transfer From 2 Stand to   Transfer to 2 Bed  (edge of bed)   Technique 2 Stand to sit   Transfer Level of Assistance 2 Hand held assistance   Trials/Comments 2 cueing for safety   Therapeutic Exercise   Therapeutic Exercise Performed Yes   Therapeutic Exercise Activity 1 BUE AROM in all planes, visual demo provided   Therapeutic Activity   Therapeutic Activity Performed Yes   Therapeutic Activity 1 ambulation within room, hallway, no LOB or dizziness noted   IP OT Assessment   OT Assessment Pt required contact guard assist for all mobility this date, motivated to engage in therapy session   Prognosis Good   Evaluation/Treatment Tolerance Patient tolerated treatment well   End of Session Communication Bedside nurse   End of Session Patient Position Bed, 2 rail up;Alarm on   OT Assessment   OT Assessment Results Decreased ADL status;Decreased functional mobility   Inpatient/Swing Bed or Outpatient   Inpatient/Swing Bed or Outpatient Inpatient   Inpatient Plan   Treatment Interventions ADL retraining;Functional transfer training   OT Frequency 3 times per week   OT Discharge Recommendations Low intensity level of continued care       Outcome Measures:[facility] Daily Activity  Putting on and taking off regular lower body clothing: A little  Bathing (including washing, rinsing, drying): A little  Putting on and taking off regular upper body clothing: A little  Toileting, which includes using toilet, bedpan or urinal: A little  Taking care of personal grooming such as brushing teeth: None  Eating  Meals: None  Daily Activity - Total Score: 20  Education Documentation  Body Mechanics, taught by EVELINE Jackson at 3/10/2025  9:33 AM.  Learner: Patient  Readiness: Acceptance  Method: Explanation  Response: Verbalizes Understanding    Precautions, taught by EVELINE Jackson at 3/10/2025  9:33 AM.  Learner: Patient  Readiness: Acceptance  Method: Explanation  Response: Verbalizes Understanding    ADL Training, taught by EVELINE Jackson at 3/10/2025  9:33 AM.  Learner: Patient  Readiness: Acceptance  Method: Explanation  Response: Verbalizes Understanding    Education Comments  No comments found.      Goals:  Encounter Problems       Encounter Problems (Active)       OT Goals       Patient will complete UE adls with MOD I (Progressing)       Start:  03/09/25    Expected End:  03/23/25            Patient will complete LE adls with MOD I (Progressing)       Start:  03/09/25    Expected End:  03/23/25            Patient will complete transfers with MOD I (Progressing)       Start:  03/09/25    Expected End:  03/23/25            Patient will complete functional mobility tasks with MOD I (Progressing)       Start:  03/09/25    Expected End:  03/23/25

## 2025-03-10 NOTE — PROGRESS NOTES
03/10/25 1403   Discharge Planning   Living Arrangements Family members   Support Systems Family members   Assistance Needed some   Type of Residence Private residence   Home or Post Acute Services None   Expected Discharge Disposition Home   Does the patient need discharge transport arranged? No   Financial Resource Strain   How hard is it for you to pay for the very basics like food, housing, medical care, and heating? Not hard   Housing Stability   In the last 12 months, was there a time when you were not able to pay the mortgage or rent on time? N   At any time in the past 12 months, were you homeless or living in a shelter (including now)? N   Transportation Needs   In the past 12 months, has lack of transportation kept you from medical appointments or from getting medications? no   In the past 12 months, has lack of transportation kept you from meetings, work, or from getting things needed for daily living? No   Stroke Family Assessment   Stroke Family Assessment Needed No   Intensity of Service   Intensity of Service 0-30 min     Met with patient at the bedside, confirmed demographics, insurance, and pcp is Dr. Caldwell. She lives with her daughter n law and grandchildren. They assist her as needed. She uses a cane or walker as needed to ambulated. The family provides transportation and care as needed. She plans to return home when medically ready for discharge. She denies any financial concerns, able to purchase her medications, received education , and takes as ordered. Patient plans to discharge home. Family can provide transportation when medically ready.

## 2025-03-11 LAB
ALBUMIN SERPL BCP-MCNC: 3 G/DL (ref 3.4–5)
ANION GAP SERPL CALC-SCNC: 7 MMOL/L (ref 10–20)
ANION GAP SERPL CALC-SCNC: 9 MMOL/L (ref 10–20)
BUN SERPL-MCNC: 22 MG/DL (ref 6–23)
BUN SERPL-MCNC: 24 MG/DL (ref 6–23)
CALCIUM SERPL-MCNC: 8.3 MG/DL (ref 8.6–10.3)
CALCIUM SERPL-MCNC: 8.7 MG/DL (ref 8.6–10.3)
CHLORIDE SERPL-SCNC: 106 MMOL/L (ref 98–107)
CHLORIDE SERPL-SCNC: 107 MMOL/L (ref 98–107)
CO2 SERPL-SCNC: 25 MMOL/L (ref 21–32)
CO2 SERPL-SCNC: 27 MMOL/L (ref 21–32)
CREAT SERPL-MCNC: 1.42 MG/DL (ref 0.5–1.05)
CREAT SERPL-MCNC: 1.63 MG/DL (ref 0.5–1.05)
EGFRCR SERPLBLD CKD-EPI 2021: 31 ML/MIN/1.73M*2
EGFRCR SERPLBLD CKD-EPI 2021: 37 ML/MIN/1.73M*2
ERYTHROCYTE [DISTWIDTH] IN BLOOD BY AUTOMATED COUNT: 14.4 % (ref 11.5–14.5)
GLUCOSE SERPL-MCNC: 82 MG/DL (ref 74–99)
GLUCOSE SERPL-MCNC: 85 MG/DL (ref 74–99)
HCT VFR BLD AUTO: 33.3 % (ref 36–46)
HGB BLD-MCNC: 10.6 G/DL (ref 12–16)
MAGNESIUM SERPL-MCNC: 1.85 MG/DL (ref 1.6–2.4)
MCH RBC QN AUTO: 30 PG (ref 26–34)
MCHC RBC AUTO-ENTMCNC: 31.8 G/DL (ref 32–36)
MCV RBC AUTO: 94 FL (ref 80–100)
NRBC BLD-RTO: 0 /100 WBCS (ref 0–0)
PHOSPHATE SERPL-MCNC: 2 MG/DL (ref 2.5–4.9)
PLATELET # BLD AUTO: 219 X10*3/UL (ref 150–450)
POTASSIUM SERPL-SCNC: 4.2 MMOL/L (ref 3.5–5.3)
POTASSIUM SERPL-SCNC: 4.3 MMOL/L (ref 3.5–5.3)
RBC # BLD AUTO: 3.53 X10*6/UL (ref 4–5.2)
SODIUM SERPL-SCNC: 136 MMOL/L (ref 136–145)
SODIUM SERPL-SCNC: 137 MMOL/L (ref 136–145)
WBC # BLD AUTO: 6.2 X10*3/UL (ref 4.4–11.3)

## 2025-03-11 PROCEDURE — 36415 COLL VENOUS BLD VENIPUNCTURE: CPT | Performed by: INTERNAL MEDICINE

## 2025-03-11 PROCEDURE — 97110 THERAPEUTIC EXERCISES: CPT | Mod: GP,CQ

## 2025-03-11 PROCEDURE — 83735 ASSAY OF MAGNESIUM: CPT

## 2025-03-11 PROCEDURE — 36415 COLL VENOUS BLD VENIPUNCTURE: CPT

## 2025-03-11 PROCEDURE — 94640 AIRWAY INHALATION TREATMENT: CPT

## 2025-03-11 PROCEDURE — 2500000001 HC RX 250 WO HCPCS SELF ADMINISTERED DRUGS (ALT 637 FOR MEDICARE OP): Performed by: INTERNAL MEDICINE

## 2025-03-11 PROCEDURE — 2500000004 HC RX 250 GENERAL PHARMACY W/ HCPCS (ALT 636 FOR OP/ED): Performed by: INTERNAL MEDICINE

## 2025-03-11 PROCEDURE — 97116 GAIT TRAINING THERAPY: CPT | Mod: GP,CQ

## 2025-03-11 PROCEDURE — 85027 COMPLETE CBC AUTOMATED: CPT

## 2025-03-11 PROCEDURE — 80048 BASIC METABOLIC PNL TOTAL CA: CPT | Mod: CCI | Performed by: INTERNAL MEDICINE

## 2025-03-11 PROCEDURE — 80069 RENAL FUNCTION PANEL: CPT

## 2025-03-11 PROCEDURE — 2500000002 HC RX 250 W HCPCS SELF ADMINISTERED DRUGS (ALT 637 FOR MEDICARE OP, ALT 636 FOR OP/ED): Performed by: INTERNAL MEDICINE

## 2025-03-11 PROCEDURE — 1100000001 HC PRIVATE ROOM DAILY

## 2025-03-11 PROCEDURE — 2500000004 HC RX 250 GENERAL PHARMACY W/ HCPCS (ALT 636 FOR OP/ED)

## 2025-03-11 PROCEDURE — 99232 SBSQ HOSP IP/OBS MODERATE 35: CPT | Performed by: INTERNAL MEDICINE

## 2025-03-11 RX ORDER — SODIUM CHLORIDE, SODIUM LACTATE, POTASSIUM CHLORIDE, CALCIUM CHLORIDE 600; 310; 30; 20 MG/100ML; MG/100ML; MG/100ML; MG/100ML
75 INJECTION, SOLUTION INTRAVENOUS CONTINUOUS
Status: DISCONTINUED | OUTPATIENT
Start: 2025-03-11 | End: 2025-03-11

## 2025-03-11 RX ORDER — SODIUM CHLORIDE, SODIUM LACTATE, POTASSIUM CHLORIDE, CALCIUM CHLORIDE 600; 310; 30; 20 MG/100ML; MG/100ML; MG/100ML; MG/100ML
75 INJECTION, SOLUTION INTRAVENOUS CONTINUOUS
Status: ACTIVE | OUTPATIENT
Start: 2025-03-11 | End: 2025-03-11

## 2025-03-11 RX ADMIN — Medication 250 MG: at 16:19

## 2025-03-11 RX ADMIN — ASPIRIN 81 MG: 81 TABLET, COATED ORAL at 08:23

## 2025-03-11 RX ADMIN — BUDESONIDE 0.5 MG: 0.5 INHALANT RESPIRATORY (INHALATION) at 21:27

## 2025-03-11 RX ADMIN — DRONEDARONE 400 MG: 400 TABLET, FILM COATED ORAL at 08:23

## 2025-03-11 RX ADMIN — FORMOTEROL FUMARATE 20 MCG: 20 SOLUTION RESPIRATORY (INHALATION) at 21:27

## 2025-03-11 RX ADMIN — PANTOPRAZOLE SODIUM 40 MG: 40 TABLET, DELAYED RELEASE ORAL at 08:23

## 2025-03-11 RX ADMIN — TIZANIDINE 2 MG: 2 TABLET ORAL at 21:21

## 2025-03-11 RX ADMIN — Medication 250 MG: at 21:20

## 2025-03-11 RX ADMIN — ALLOPURINOL 100 MG: 100 TABLET ORAL at 08:23

## 2025-03-11 RX ADMIN — POTASSIUM CHLORIDE 20 MEQ: 1500 TABLET, EXTENDED RELEASE ORAL at 08:23

## 2025-03-11 RX ADMIN — SODIUM CHLORIDE, POTASSIUM CHLORIDE, SODIUM LACTATE AND CALCIUM CHLORIDE 75 ML/HR: 600; 310; 30; 20 INJECTION, SOLUTION INTRAVENOUS at 12:49

## 2025-03-11 RX ADMIN — DRONEDARONE 400 MG: 400 TABLET, FILM COATED ORAL at 21:20

## 2025-03-11 RX ADMIN — ROSUVASTATIN CALCIUM 5 MG: 5 TABLET, FILM COATED ORAL at 21:21

## 2025-03-11 RX ADMIN — APIXABAN 2.5 MG: 2.5 TABLET, FILM COATED ORAL at 01:03

## 2025-03-11 RX ADMIN — SODIUM CHLORIDE, POTASSIUM CHLORIDE, SODIUM LACTATE AND CALCIUM CHLORIDE 75 ML/HR: 600; 310; 30; 20 INJECTION, SOLUTION INTRAVENOUS at 01:27

## 2025-03-11 RX ADMIN — APIXABAN 2.5 MG: 2.5 TABLET, FILM COATED ORAL at 12:48

## 2025-03-11 ASSESSMENT — COGNITIVE AND FUNCTIONAL STATUS - GENERAL
TOILETING: A LITTLE
PERSONAL GROOMING: A LITTLE
CLIMB 3 TO 5 STEPS WITH RAILING: TOTAL
MOVING TO AND FROM BED TO CHAIR: A LITTLE
HELP NEEDED FOR BATHING: A LITTLE
CLIMB 3 TO 5 STEPS WITH RAILING: A LOT
STANDING UP FROM CHAIR USING ARMS: A LITTLE
WALKING IN HOSPITAL ROOM: A LITTLE
HELP NEEDED FOR BATHING: A LITTLE
WALKING IN HOSPITAL ROOM: A LITTLE
DAILY ACTIVITIY SCORE: 18
MOVING TO AND FROM BED TO CHAIR: A LITTLE
MOBILITY SCORE: 17
TURNING FROM BACK TO SIDE WHILE IN FLAT BAD: A LITTLE
MOVING TO AND FROM BED TO CHAIR: A LITTLE
MOBILITY SCORE: 18
MOBILITY SCORE: 20
DRESSING REGULAR LOWER BODY CLOTHING: A LITTLE
DAILY ACTIVITIY SCORE: 21
TOILETING: A LITTLE
WALKING IN HOSPITAL ROOM: A LITTLE
DRESSING REGULAR LOWER BODY CLOTHING: A LITTLE
CLIMB 3 TO 5 STEPS WITH RAILING: A LITTLE
DRESSING REGULAR UPPER BODY CLOTHING: A LITTLE
EATING MEALS: A LITTLE
STANDING UP FROM CHAIR USING ARMS: A LITTLE
MOVING FROM LYING ON BACK TO SITTING ON SIDE OF FLAT BED WITH BEDRAILS: A LITTLE
TURNING FROM BACK TO SIDE WHILE IN FLAT BAD: A LITTLE

## 2025-03-11 ASSESSMENT — PAIN SCALES - GENERAL
PAINLEVEL_OUTOF10: 0 - NO PAIN

## 2025-03-11 ASSESSMENT — PAIN - FUNCTIONAL ASSESSMENT
PAIN_FUNCTIONAL_ASSESSMENT: 0-10
PAIN_FUNCTIONAL_ASSESSMENT: 0-10

## 2025-03-11 NOTE — PROGRESS NOTES
Norma Villasenor is a 84 y.o. female on day 2 of admission presenting with Intractable nausea and vomiting.    Subjective   Patient was actually feeling significantly improved today, she did say that she was feeling little bit better yesterday, although that her diarrhea has subjectively stopped, she was tolerating a p.o. intake, but still feeling more generally weak, denies any fevers or chills.  Has a much better appetite again today.    Objective     Physical Exam  Vitals reviewed.   Constitutional:       General: She is not in acute distress.     Appearance: Normal appearance. She is not ill-appearing.   HENT:      Head: Normocephalic and atraumatic.      Nose: Nose normal.      Mouth/Throat:      Mouth: Mucous membranes are moist.   Eyes:      General: No scleral icterus.  Cardiovascular:      Rate and Rhythm: Normal rate and regular rhythm.      Pulses: Normal pulses.      Heart sounds: Normal heart sounds. No murmur heard.     No friction rub. No gallop.   Pulmonary:      Effort: Pulmonary effort is normal. No respiratory distress.      Breath sounds: Normal breath sounds. No wheezing, rhonchi or rales.   Abdominal:      General: Abdomen is flat. Bowel sounds are normal. There is no distension.      Palpations: Abdomen is soft. There is no mass.      Tenderness: There is no abdominal tenderness. There is no guarding or rebound.   Musculoskeletal:         General: No swelling, tenderness or signs of injury.      Right lower leg: No edema.      Left lower leg: No edema.   Skin:     General: Skin is warm and dry.      Coloration: Skin is not jaundiced or pale.      Findings: No bruising, erythema, lesion or rash.   Neurological:      Mental Status: She is alert and oriented to person, place, and time. Mental status is at baseline.      Motor: Weakness present.   Psychiatric:         Mood and Affect: Mood normal.         Behavior: Behavior normal.         Thought Content: Thought content normal.         Judgment:  "Judgment normal.       Last Recorded Vitals  Blood pressure 92/55, pulse 54, temperature 36.6 °C (97.9 °F), temperature source Temporal, resp. rate 17, height 1.473 m (4' 9.99\"), weight 46.5 kg (102 lb 8 oz), SpO2 94%.  Intake/Output last 3 Shifts:  I/O last 3 completed shifts:  In: 3460.8 (74.4 mL/kg) [P.O.:780; I.V.:2680.8 (57.7 mL/kg)]  Out: 3 (0.1 mL/kg) [Urine:1 (0 mL/kg/hr); Stool:2]  Weight: 46.5 kg     Relevant Results  Scheduled medications  [Held by provider] allopurinol, 100 mg, oral, Daily  amLODIPine, 5 mg, oral, Daily  apixaban, 2.5 mg, oral, q12h  aspirin, 81 mg, oral, Daily  budesonide, 0.5 mg, nebulization, BID  dronedarone, 400 mg, oral, BID  formoterol, 20 mcg, nebulization, BID  [Held by provider] gabapentin, 100 mg, oral, Nightly  metoprolol succinate XL, 50 mg, oral, Nightly  pantoprazole, 40 mg, oral, Daily  potassium chloride CR, 20 mEq, oral, Daily  potassium, sodium phosphates, 1 packet, oral, 4x daily  rosuvastatin, 5 mg, oral, Nightly  tiZANidine, 2 mg, oral, Nightly      Continuous medications       PRN medications  PRN medications: benzocaine-menthol, dicyclomine, loperamide, meclizine, ondansetron **OR** ondansetron, prochlorperazine    Results for orders placed or performed during the hospital encounter of 03/08/25 (from the past 24 hours)   Renal Function Panel   Result Value Ref Range    Glucose 82 74 - 99 mg/dL    Sodium 137 136 - 145 mmol/L    Potassium 4.2 3.5 - 5.3 mmol/L    Chloride 107 98 - 107 mmol/L    Bicarbonate 27 21 - 32 mmol/L    Anion Gap 7 (L) 10 - 20 mmol/L    Urea Nitrogen 22 6 - 23 mg/dL    Creatinine 1.42 (H) 0.50 - 1.05 mg/dL    eGFR 37 (L) >60 mL/min/1.73m*2    Calcium 8.7 8.6 - 10.3 mg/dL    Phosphorus 2.0 (L) 2.5 - 4.9 mg/dL    Albumin 3.0 (L) 3.4 - 5.0 g/dL   Magnesium   Result Value Ref Range    Magnesium 1.85 1.60 - 2.40 mg/dL   CBC   Result Value Ref Range    WBC 6.2 4.4 - 11.3 x10*3/uL    nRBC 0.0 0.0 - 0.0 /100 WBCs    RBC 3.53 (L) 4.00 - 5.20 x10*6/uL "    Hemoglobin 10.6 (L) 12.0 - 16.0 g/dL    Hematocrit 33.3 (L) 36.0 - 46.0 %    MCV 94 80 - 100 fL    MCH 30.0 26.0 - 34.0 pg    MCHC 31.8 (L) 32.0 - 36.0 g/dL    RDW 14.4 11.5 - 14.5 %    Platelets 219 150 - 450 x10*3/uL   Basic Metabolic Panel   Result Value Ref Range    Glucose 85 74 - 99 mg/dL    Sodium 136 136 - 145 mmol/L    Potassium 4.3 3.5 - 5.3 mmol/L    Chloride 106 98 - 107 mmol/L    Bicarbonate 25 21 - 32 mmol/L    Anion Gap 9 (L) 10 - 20 mmol/L    Urea Nitrogen 24 (H) 6 - 23 mg/dL    Creatinine 1.63 (H) 0.50 - 1.05 mg/dL    eGFR 31 (L) >60 mL/min/1.73m*2    Calcium 8.3 (L) 8.6 - 10.3 mg/dL     ECG 12 lead    Result Date: 3/10/2025  Normal sinus rhythm Nonspecific ST and T wave abnormality Abnormal ECG When compared with ECG of 28-FEB-2025 16:50, Premature atrial complexes are no longer Present Nonspecific T wave abnormality no longer evident in Anterior leads    CT abdomen pelvis wo IV contrast    Result Date: 3/9/2025  Interpreted By:  Floridalma Echeverria, STUDY: CT ABDOMEN PELVIS WO IV CONTRAST;  3/9/2025 3:29 am   INDICATION: Signs/Symptoms:ULI, persistent vomiting, recent UTI.   COMPARISON: 01/19/2023   ACCESSION NUMBER(S): [id number]   ORDERING CLINICIAN: JOANA DA SILVA   TECHNIQUE: Axial noncontrast CT images of the abdomen and pelvis with coronal and sagittal reconstructed images.   FINDINGS: LOWER CHEST: No acute abnormality of the lung bases. Severe coronary artery calcifications. small hiatal hernia. BONES: No acute osseous abnormality. Grade 1 anterolisthesis of L4 on L5, likely on a degenerative basis. Multilevel degenerative disc changes. Severe lower lumbar facet arthropathy. Severe left hip osteoarthrosis. ABDOMINAL WALL: Within normal limits.   ABDOMEN: Lack of intravenous contrast limits evaluation of vessels and solid organs. LIVER: 1.2 cm cyst in the caudate lobe. Additional subcentimeter hypodense lesion in the right lobe is too small to characterize, but likely a small cyst. BILE  DUCTS: Stable mild extrahepatic biliary dilatation likely due to cholecystectomy changes. GALLBLADDER: Cholecystectomy. PANCREAS: No peripancreatic inflammatory stranding or duct dilatation. SPLEEN: Within normal limits. ADRENALS: Within normal limits.   KIDNEYS, URETERS, URINARY BLADDER: No hydronephrosis or urinary tract calculus. Bilateral renal cysts measuring up to 2.3 cm on the right and 1.9 cm on the left. The urinary bladder is unremarkable.   VESSELS: No aortic aneurysm. RETROPERITONEUM: No pathologically enlarged lymph nodes.   PELVIS:   REPRODUCTIVE ORGANS: Calcified uterine fibroids. No significant free pelvic fluid.   BOWEL: No dilated bowel. Colonic diverticulosis without acute diverticulitis. Normal appendix. PERITONEUM: No ascites or free air, no fluid collection.       No acute abdominal or pelvic process.       MACRO: None   Signed by: Floridalma Echeverria 3/9/2025 3:43 AM Dictation workstation:   [id number]    ECG 12 lead    Result Date: 3/7/2025  Sinus rhythm with Premature atrial complexes Otherwise normal ECG When compared with ECG of 10-FEB-2025 09:23, Premature atrial complexes are now Present Confirmed by TORIN Hodge ([id number]) on 3/7/2025 5:07:38 PM    XR chest 1 view    Result Date: 2/28/2025  STUDY: Chest Radiograph;  2/28/2025 5:02PM INDICATION: Vomiting. COMPARISON: 2/7/2025 XR Chest. ACCESSION NUMBER(S): [id number] ORDERING CLINICIAN: MARK SUMMERS TECHNIQUE:  Frontal chest was obtained at 17:02 hours. FINDINGS: CARDIOMEDIASTINAL SILHOUETTE: Cardiomediastinal silhouette is normal in size and configuration.  LUNGS: Lungs are clear.  ABDOMEN: No remarkable upper abdominal findings.  BONES: No acute osseous changes.    No radiographic evidence of acute cardiopulmonary disease. Signed by Karri Matthews MD    Electrocardiogram, 12-lead PRN ACS symptoms    Result Date: 2/19/2025  Normal sinus rhythm Normal ECG When compared with ECG of 09-FEB-2025 10:41, T wave amplitude has decreased in Anterior  leads Confirmed by Lexa Villeda ([id number]) on 2/19/2025 1:34:58 PM    Electrocardiogram, 12-lead PRN ACS symptoms    Result Date: 2/19/2025  Normal sinus rhythm Normal ECG When compared with ECG of 09-FEB-2025 10:41, (unconfirmed) T wave amplitude has decreased in Anterior leads Confirmed by Lexa Villeda ([id number]) on 2/19/2025 1:34:55 PM    ECG 12 lead    Result Date: 2/19/2025  Suspect arm lead reversal, interpretation assumes no reversal Atrial fibrillation with rapid ventricular response Lateral infarct , age undetermined Inferior infarct , age undetermined Abnormal ECG When compared with ECG of 08-FEB-2025 12:14, (unconfirmed) Significant changes have occurred Confirmed by Lexa Villeda ([id number]) on 2/19/2025 12:34:05 PM    ECG 12 lead    Result Date: 2/19/2025  Sinus bradycardia with frequent and consecutive Premature ventricular complexes and Possible Premature atrial complexes with Aberrant conduction Abnormal ECG When compared with ECG of 08-FEB-2025 12:13, (unconfirmed) Fusion complexes are no longer Present Premature ventricular complexes are now Present Right bundle branch block is no longer Present Confirmed by Lexa Villeda ([id number]) on 2/19/2025 12:34:02 PM    ECG 12 lead    Result Date: 2/17/2025  Sinus tachycardia with Premature supraventricular complexes and Fusion complexes Right bundle branch block When compared with ECG of 08-FEB-2025 09:06, Sinus rhythm has replaced Atrial fibrillation Right bundle branch block is now Present Reconfirmed by Prabhjot Trejo ([id number]) on 2/17/2025 9:38:12 AM    Electrocardiogram, 12-lead PRN ACS symptoms    Result Date: 2/10/2025  Normal sinus rhythm Increased R/S ratio in V1, consider early transition or posterior infarct When compared with ECG of 08-FEB-2025 21:26, (unconfirmed) Significant changes have occurred Reconfirmed by Prabhjot Trejo ([id number]) on 2/10/2025 6:48:25 PM    Transthoracic Echo (TTE) Complete    Result Date: 2/10/2025            [facility] [id number]  [facility] [id number]    Tel [phone] Fax [phone] TRANSTHORACIC ECHOCARDIOGRAM REPORT Patient Name:       ANNIA TUCKER         Reading Physician:    [id number] Lexa Villeda MD Study Date:         2/9/2025              Ordering Provider:    [id number] RICARDO MARCUS MRN/PID:            [id number]              Fellow: Accession#:         [id number]          Nurse: Date of Birth/Age:  1940 / 84 years Sonographer:          Figueroa Melo RDCS Gender Assigned at  F                     Additional Staff: Birth: Height:             149.00 cm             Admit Date: Weight:             53.00 kg              Admission Status: BSA / BMI:          1.46 m2 / 23.87 kg/m2 Department Location: Blood Pressure: 121 /60 mmHg Study Type:    TRANSTHORACIC ECHO (TTE) COMPLETE Diagnosis/ICD: Unspecified atrial fibrillation-I48.91 CPT Codes:     Echo Complete w Full Doppler-[id number]  Study Detail: The following Echo studies were performed: 2D, Doppler, M-Mode and               color flow.  PHYSICIAN INTERPRETATION: Left Ventricle: The left ventricular systolic function is normal, with a visually estimated ejection fraction of 60-65%. There is mild concentric left ventricular hypertrophy. There are no regional wall motion abnormalities. The left ventricular cavity size is normal. There is mild increased septal and mildly increased posterior left ventricular wall thickness. There is left ventricular concentric remodeling. Spectral Doppler shows a Grade I (impaired relaxation pattern) of left ventricular diastolic filling with normal left atrial filling pressure. Left Atrium: The left atrial size is moderate to severely dilated. Right Ventricle: The right ventricle is normal in size. There is normal right ventricular global systolic  function. Right Atrium: The right atrial size is normal. Aortic Valve: The aortic valve is trileaflet. There is minimal aortic valve cusp calcification. There is evidence of mildly elevated transaortic gradients consistent with sclerosis of the aortic valve. There is no evidence of aortic valve regurgitation. The peak instantaneous gradient of the aortic valve is 8 mmHg. Mitral Valve: The mitral valve is mildly thickened. There is mild to moderate mitral annular calcification. There is trace mitral valve regurgitation. Tricuspid Valve: The tricuspid valve is structurally normal. There is trace tricuspid regurgitation. The Doppler estimated RVSP is within normal limits at 26.4 mmHg. Pulmonic Valve: The pulmonic valve is structurally normal. There is no indication of pulmonic valve regurgitation. Pericardium: No pericardial effusion noted. Aorta: The aortic root is normal. In comparison to the previous echocardiogram(s): There are no prior studies on this patient for comparison purposes.  CONCLUSIONS:  1. The left ventricular systolic function is normal, with a visually estimated ejection fraction of 60-65%.  2. Spectral Doppler shows a Grade I (impaired relaxation pattern) of left ventricular diastolic filling with normal left atrial filling pressure.  3. The left atrial size is moderate to severely dilated.  4. Right ventricular systolic pressure is within normal limits.  5. Aortic valve sclerosis. QUANTITATIVE DATA SUMMARY:  2D MEASUREMENTS:             Normal Ranges: LAs:             4.20 cm     (2.7-4.0cm) IVSd:            1.00 cm     (0.6-1.1cm) LVPWd:           1.10 cm     (0.6-1.1cm) LVIDd:           3.80 cm     (3.9-5.9cm) LV Mass Index:   86.1 g/m2 LVEDV Index:     33.39 ml/m2  LEFT ATRIUM:                Normal Ranges: LA Volume Index: 27.0 ml/m2  M-MODE MEASUREMENTS:         Normal Ranges: Ao Root:             2.40 cm (2.0-3.7cm) AoV Exc:             1.60 cm (1.5-2.5cm)  AORTA MEASUREMENTS:         Normal  Ranges: AoV Exc:            1.60 cm (1.5-2.5cm) Asc Ao, d:          2.80 cm (2.1-3.4cm)  LV SYSTOLIC FUNCTION:                      Normal Ranges: EF-A4C View:    56 % (>=55%) EF-A2C View:    68 % EF-Biplane:     61 % EF-Visual:      63 % LV EF Reported: 63 %  LV DIASTOLIC FUNCTION:           Normal Ranges: MV Peak E:             0.74 m/s  (0.7-1.2 m/s) MV Peak A:             1.03 m/s  (0.42-0.7 m/s) E/A Ratio:             0.72      (1.0-2.2) MV e'                  0.063 m/s (>8.0) MV lateral e'          0.06 m/s MV medial e'           0.07 m/s E/e' Ratio:            11.76     (<8.0)  MITRAL VALVE:          Normal Ranges: MV DT:        261 msec (150-240msec)  AORTIC VALVE:           Normal Ranges: AoV Vmax:      1.39 m/s (<=1.7m/s) AoV Peak P.7 mmHg (<20mmHg) LVOT Max Norm:  0.99 m/s (<=1.1m/s) LVOT Diameter: 1.90 cm  (1.8-2.4cm) AoV Area,Vmax: 2.02 cm2 (2.5-4.5cm2)  RIGHT VENTRICLE: RV Basal 3.65 cm RV Mid   2.84 cm RV Major 5.1 cm TAPSE:   17.2 mm RV s'    0.20 m/s  TRICUSPID VALVE/RVSP:          Normal Ranges: Peak TR Velocity:     2.42 m/s RV Syst Pressure:     26 mmHg  (< 30mmHg)  [id number] Lexa Villeda MD Electronically signed on 2/10/2025 at 8:13:52 AM  ** Final **          Assessment/Plan   This is a very pleasant 84-year-old lady known to writer from hospital admission 1 month prior for influenza complicated by ULI and atrial fibrillation that had all resolved, she presented to the emergency department a week prior with symptoms of acute cystitis, was diagnosed with a E. coli UTI and discharged appropriately on Bactrim, as the cultures from the urine did show sensitivity with Bactrim; she had intractable nausea and vomiting in the days after that, she came to the hospital and ultimately was admitted for observation and found to have a more severe ULI of prerenal etiology that is improving with IV fluid resuscitation initially, although slightly worse again today, will stop the IV fluids, as the diarrhea  has stopped, replace the patient's phosphorus and encourage p.o. intake, patient ultimately would like to discharge to home    Assessment & Plan  Intractable nausea and vomiting  ULI with prerenal etiology   Hx of HTN    Plan:  - Continue inpatient admission; no additional need for any anti-infectives  - Continue IV fluid resuscitation and continue to trend creatinine as it is steadily improving, near baseline  - Encourage p.o. intake, continue with regular diet with low sodium  - Add nutrition supplements  - May use as needed loperamide with any additional episode of diarrhea as ordered  - Anticipate a need for discharge home with home health care including PT/OT, which should be as soon as tomorrow    Diet: Regular, low-sodium  VTE PPx: Eliquis  Code: Full           I spent 35 minutes in the professional and overall care of this patient.    Tan Mackenzie MD

## 2025-03-11 NOTE — CARE PLAN
The patient's goals for the shift include      The clinical goals for the shift include Pt will tolerate diet

## 2025-03-11 NOTE — PROGRESS NOTES
Physical Therapy    Physical Therapy    Physical Therapy Treatment    Patient Name: Norma Villasenor  MRN: [id number]  Today's Date: 3/11/2025  Time Calculation  Start Time: 1002  Stop Time: 1030  Time Calculation (min): 28 min     3131/3131-A    Assessment/Plan   PT Assessment  PT Assessment Results: Decreased endurance, Decreased strength, Impaired balance, Decreased mobility  End of Session Communication: Bedside nurse  Assessment Comment: Pt demonstrates increased time and effort with all mobility and requires multiple rest breaks throughout session. Pt was left in bed with fall safety activated and call light in reach.  End of Session Patient Position: Bed, 2 rail up, Alarm on  PT Plan  Inpatient/Swing Bed or Outpatient: Inpatient  PT Plan  Treatment/Interventions: Bed mobility, Transfer training, Gait training, Stair training, Balance training, Neuromuscular re-education, Strengthening, Endurance training, Range of motion, Therapeutic exercise, Therapeutic activity, Home exercise program, Positioning, Postural re-education  PT Plan: Ongoing PT  PT Frequency: 3 times per week  PT Discharge Recommendations: Low intensity level of continued care  Equipment Recommended upon Discharge: Wheeled walker  PT Recommended Transfer Status: Contact guard  PT - OK to Discharge: Yes (Pt ok to dc from acute care PT to next level of care once cleared by medical team.)    Outcome Measures:  [facility] Basic Mobility  Turning from your back to your side while in a flat bed without using bedrails: None  Moving from lying on your back to sitting on the side of a flat bed without using bedrails: A little  Moving to and from bed to chair (including a wheelchair): A little  Standing up from a chair using your arms (e.g. wheelchair or bedside chair): A little  To walk in hospital room: A little  Climbing 3-5 steps with railing: Total  Basic Mobility - Total Score: 17               03/11/25 1002   PT  Visit   PT Received On 03/11/25   Response  to Previous Treatment Patient with no complaints from previous session.   General   Reason for Referral activties of daily living   Referred By Gurdeep   Past Medical History Relevant to Rehab Patient admitted with weakness, vomitting, dehydration. PMH: GERD, HTN, kae   Family/Caregiver Present No   Prior to Session Communication Bedside nurse   Patient Position Received Bed, 2 rail up;Alarm on   Preferred Learning Style verbal;visual   General Comment nurse cleared pt for therapy this date-pt agreeable  ([place])   Precautions   LE Weight Bearing Status Weight Bearing as Tolerated   Medical Precautions Fall precautions   Pain Assessment   Pain Assessment 0-10   0-10 (Numeric) Pain Score 0 - No pain   Therapeutic Exercise   Therapeutic Exercise Performed Yes   Therapeutic Exercise Activity 1 DF/PF x10   Therapeutic Exercise Activity 2 QS x10   Therapeutic Exercise Activity 3 GS x10   Therapeutic Exercise Activity 4 LAQ x10   Bed Mobility   Bed Mobility Yes   Bed Mobility 1   Bed Mobility 1 Supine to sitting   Level of Assistance 1   (SBA)   Bed Mobility Comments 1 HOB elevated   Bed Mobility 2   Bed Mobility  2 Sitting to supine   Level of Assistance 2   (SBA)   Ambulation/Gait Training   Ambulation/Gait Training Performed Yes   Ambulation/Gait Training 1   Surface 1 Level tile   Device 1 Rolling walker   Gait Support Devices Gait belt   Assistance 1 Contact guard   Quality of Gait 1 Inconsistent stride length;Decreased step length;Diminished heel strike   Comments/Distance (ft) 1 ~30ft reciprocal gait pattern demonstrating slow kwaku   Ambulation/Gait Training 2   Surface 2 Level tile   Device 2 Rolling walker   Gait Support Devices Gait belt   Assistance 2 Contact guard   Quality of Gait 2 Inconsistent stride length;Decreased step length;Diminished heel strike   Comments/Distance (ft) 2 ~5ft bed>chair   Transfers   Transfer Yes   Transfer 1   Transfer From 1 Chair with arms to   Transfer to 1 Toilet   Technique 1  Sit to stand;Stand to sit   Transfer Device 1 Walker;Gait belt   Transfer Level of Assistance 1 Contact guard   Transfers 2   Transfer From 2 Toilet to   Transfer to 2 Bed   Technique 2 Sit to stand;Stand to sit   Transfer Device 2 Walker;Gait belt   Transfer Level of Assistance 2 Contact guard   Trials/Comments 2 pt uses grab bars to pull to stand   Transfers 3   Technique 3 Sit to stand;Stand to sit   Transfer Device 3 Walker;Gait belt   Transfer Level of Assistance 3 Contact guard   Trials/Comments 3 x3 STS from chair to improve BLE strength and proper technique/hand placement  (Pt demonstrates good follow through after v/t cues by end of session)   Activity Tolerance   Endurance Tolerates 10 - 20 min exercise with multiple rests   PT Assessment   PT Assessment Results Decreased endurance;Decreased strength;Impaired balance;Decreased mobility   End of Session Communication Bedside nurse   Assessment Comment Pt demonstrates increased time and effort with all mobility and requires multiple rest breaks throughout session. Pt was left in bed with fall safety activated and call light in reach.   End of Session Patient Position Bed, 2 rail up;Alarm on   PT Plan   Inpatient/Swing Bed or Outpatient Inpatient   PT Plan   PT Plan Ongoing PT   PT Frequency 3 times per week   PT Discharge Recommendations Low intensity level of continued care   Equipment Recommended upon Discharge Wheeled walker              EDUCATION:     Education Documentation  No documentation found.  Education Comments  No comments found.        GOALS:  Encounter Problems       Encounter Problems (Active)       PT Problem       Bed mobility (Progressing)       Start:  03/09/25    Expected End:  03/23/25       Pt will perform supine<>sit with HOB flat, TIA.           Transfers (Progressing)       Start:  03/09/25    Expected End:  03/23/25       Pt will perform all transfers with LRAD, TIA.            Gait (Progressing)       Start:  03/09/25     Expected End:  03/23/25       Pt will amb 150' with LRAD, TIA with reciprocal gait, upright posture and improved activity tolerance as demonstrated by vitals.           Stairs (Progressing)       Start:  03/09/25    Expected End:  03/23/25       Pt will ascend/descend 10 steps with LRAD, SBAx1.           Balance (Progressing)       Start:  03/09/25    Expected End:  03/23/25       Pt will tolerate standing x2 min without UE support, SBAx1 during dynamic balance challenges.              Pain - Adult

## 2025-03-11 NOTE — CARE PLAN
Problem: Pain - Adult  Goal: Verbalizes/displays adequate comfort level or baseline comfort level  Outcome: Progressing     Problem: Safety - Adult  Goal: Free from fall injury  Outcome: Progressing     Problem: Discharge Planning  Goal: Discharge to home or other facility with appropriate resources  Outcome: Progressing     Problem: Chronic Conditions and Co-morbidities  Goal: Patient's chronic conditions and co-morbidity symptoms are monitored and maintained or improved  Outcome: Progressing     Problem: Nutrition  Goal: Nutrient intake appropriate for maintaining nutritional needs  Outcome: Progressing     Problem: Skin  Goal: Participates in plan/prevention/treatment measures  Outcome: Progressing  Goal: Prevent/manage excess moisture  Outcome: Progressing  Goal: Prevent/minimize sheer/friction injuries  Outcome: Progressing  Goal: Promote/optimize nutrition  Outcome: Progressing  Goal: Promote skin healing  Outcome: Progressing     Problem: Fall/Injury  Goal: Not fall by end of shift  Outcome: Progressing  Goal: Be free from injury by end of the shift  Outcome: Progressing  Goal: Verbalize understanding of personal risk factors for fall in the hospital  Outcome: Progressing  Goal: Verbalize understanding of risk factor reduction measures to prevent injury from fall in the home  Outcome: Progressing  Goal: Use assistive devices by end of the shift  Outcome: Progressing  Goal: Pace activities to prevent fatigue by end of the shift  Outcome: Progressing   The patient's goals for the shift include      The clinical goals for the shift include pt will tolerate diet t/o the shift    Over the shift, the patient did make progress toward the following goals.

## 2025-03-12 ENCOUNTER — APPOINTMENT (OUTPATIENT)
Dept: CARDIOLOGY | Facility: HOSPITAL | Age: 85
End: 2025-03-12
Payer: MEDICARE

## 2025-03-12 ENCOUNTER — APPOINTMENT (OUTPATIENT)
Dept: CARDIOLOGY | Facility: CLINIC | Age: 85
End: 2025-03-12
Payer: MEDICARE

## 2025-03-12 LAB
ALBUMIN SERPL BCP-MCNC: 2.7 G/DL (ref 3.4–5)
ANION GAP SERPL CALC-SCNC: 11 MMOL/L (ref 10–20)
BUN SERPL-MCNC: 26 MG/DL (ref 6–23)
CALCIUM SERPL-MCNC: 8.4 MG/DL (ref 8.6–10.3)
CHLORIDE SERPL-SCNC: 107 MMOL/L (ref 98–107)
CO2 SERPL-SCNC: 21 MMOL/L (ref 21–32)
CREAT SERPL-MCNC: 1.48 MG/DL (ref 0.5–1.05)
EGFRCR SERPLBLD CKD-EPI 2021: 35 ML/MIN/1.73M*2
ERYTHROCYTE [DISTWIDTH] IN BLOOD BY AUTOMATED COUNT: 14.3 % (ref 11.5–14.5)
GLUCOSE SERPL-MCNC: 91 MG/DL (ref 74–99)
HCT VFR BLD AUTO: 29.5 % (ref 36–46)
HGB BLD-MCNC: 9.2 G/DL (ref 12–16)
MAGNESIUM SERPL-MCNC: 1.48 MG/DL (ref 1.6–2.4)
MCH RBC QN AUTO: 30 PG (ref 26–34)
MCHC RBC AUTO-ENTMCNC: 31.2 G/DL (ref 32–36)
MCV RBC AUTO: 96 FL (ref 80–100)
NRBC BLD-RTO: 0 /100 WBCS (ref 0–0)
PHOSPHATE SERPL-MCNC: 2 MG/DL (ref 2.5–4.9)
PLATELET # BLD AUTO: 168 X10*3/UL (ref 150–450)
POTASSIUM SERPL-SCNC: 4.6 MMOL/L (ref 3.5–5.3)
RBC # BLD AUTO: 3.07 X10*6/UL (ref 4–5.2)
SODIUM SERPL-SCNC: 134 MMOL/L (ref 136–145)
WBC # BLD AUTO: 7.5 X10*3/UL (ref 4.4–11.3)

## 2025-03-12 PROCEDURE — 94640 AIRWAY INHALATION TREATMENT: CPT

## 2025-03-12 PROCEDURE — 2500000001 HC RX 250 WO HCPCS SELF ADMINISTERED DRUGS (ALT 637 FOR MEDICARE OP): Performed by: INTERNAL MEDICINE

## 2025-03-12 PROCEDURE — 83735 ASSAY OF MAGNESIUM: CPT

## 2025-03-12 PROCEDURE — 93005 ELECTROCARDIOGRAM TRACING: CPT

## 2025-03-12 PROCEDURE — 80069 RENAL FUNCTION PANEL: CPT

## 2025-03-12 PROCEDURE — 85027 COMPLETE CBC AUTOMATED: CPT

## 2025-03-12 PROCEDURE — 93010 ELECTROCARDIOGRAM REPORT: CPT | Performed by: INTERNAL MEDICINE

## 2025-03-12 PROCEDURE — 99232 SBSQ HOSP IP/OBS MODERATE 35: CPT | Performed by: INTERNAL MEDICINE

## 2025-03-12 PROCEDURE — 1200000002 HC GENERAL ROOM WITH TELEMETRY DAILY

## 2025-03-12 PROCEDURE — 36415 COLL VENOUS BLD VENIPUNCTURE: CPT

## 2025-03-12 PROCEDURE — 2500000002 HC RX 250 W HCPCS SELF ADMINISTERED DRUGS (ALT 637 FOR MEDICARE OP, ALT 636 FOR OP/ED): Performed by: INTERNAL MEDICINE

## 2025-03-12 PROCEDURE — 97535 SELF CARE MNGMENT TRAINING: CPT | Mod: GO,CO

## 2025-03-12 PROCEDURE — 2500000004 HC RX 250 GENERAL PHARMACY W/ HCPCS (ALT 636 FOR OP/ED)

## 2025-03-12 RX ADMIN — METOPROLOL SUCCINATE 50 MG: 50 TABLET, EXTENDED RELEASE ORAL at 20:34

## 2025-03-12 RX ADMIN — APIXABAN 2.5 MG: 2.5 TABLET, FILM COATED ORAL at 00:35

## 2025-03-12 RX ADMIN — TIZANIDINE 2 MG: 2 TABLET ORAL at 20:33

## 2025-03-12 RX ADMIN — POTASSIUM CHLORIDE 20 MEQ: 1500 TABLET, EXTENDED RELEASE ORAL at 09:06

## 2025-03-12 RX ADMIN — PANTOPRAZOLE SODIUM 40 MG: 40 TABLET, DELAYED RELEASE ORAL at 09:05

## 2025-03-12 RX ADMIN — BUDESONIDE 0.5 MG: 0.5 INHALANT RESPIRATORY (INHALATION) at 08:11

## 2025-03-12 RX ADMIN — APIXABAN 2.5 MG: 2.5 TABLET, FILM COATED ORAL at 12:43

## 2025-03-12 RX ADMIN — BUDESONIDE 0.5 MG: 0.5 INHALANT RESPIRATORY (INHALATION) at 21:19

## 2025-03-12 RX ADMIN — ROSUVASTATIN CALCIUM 5 MG: 5 TABLET, FILM COATED ORAL at 20:34

## 2025-03-12 RX ADMIN — FORMOTEROL FUMARATE 20 MCG: 20 SOLUTION RESPIRATORY (INHALATION) at 08:11

## 2025-03-12 RX ADMIN — FORMOTEROL FUMARATE 20 MCG: 20 SOLUTION RESPIRATORY (INHALATION) at 21:20

## 2025-03-12 RX ADMIN — SODIUM CHLORIDE, POTASSIUM CHLORIDE, SODIUM LACTATE AND CALCIUM CHLORIDE 500 ML: 600; 310; 30; 20 INJECTION, SOLUTION INTRAVENOUS at 00:36

## 2025-03-12 RX ADMIN — ASPIRIN 81 MG: 81 TABLET, COATED ORAL at 09:05

## 2025-03-12 ASSESSMENT — COGNITIVE AND FUNCTIONAL STATUS - GENERAL
HELP NEEDED FOR BATHING: A LITTLE
WALKING IN HOSPITAL ROOM: A LITTLE
TOILETING: A LITTLE
DRESSING REGULAR UPPER BODY CLOTHING: A LITTLE
MOVING TO AND FROM BED TO CHAIR: A LITTLE
DAILY ACTIVITIY SCORE: 19
CLIMB 3 TO 5 STEPS WITH RAILING: A LITTLE
DAILY ACTIVITIY SCORE: 23
DRESSING REGULAR LOWER BODY CLOTHING: A LITTLE
MOBILITY SCORE: 21
PERSONAL GROOMING: A LITTLE
TOILETING: A LITTLE

## 2025-03-12 ASSESSMENT — PAIN SCALES - GENERAL
PAINLEVEL_OUTOF10: 0 - NO PAIN
PAINLEVEL_OUTOF10: 0 - NO PAIN

## 2025-03-12 ASSESSMENT — PAIN - FUNCTIONAL ASSESSMENT: PAIN_FUNCTIONAL_ASSESSMENT: 0-10

## 2025-03-12 ASSESSMENT — ACTIVITIES OF DAILY LIVING (ADL): HOME_MANAGEMENT_TIME_ENTRY: 14

## 2025-03-12 NOTE — CARE PLAN
The clinical goals for the shift include remain free from falls and injury this shift      Problem: Pain - Adult  Goal: Verbalizes/displays adequate comfort level or baseline comfort level  Outcome: Progressing  Flowsheets (Taken 3/10/2025 1052)  Verbalizes/displays adequate comfort level or baseline comfort level:   Encourage patient to monitor pain and request assistance   Assess pain using appropriate pain scale   Administer analgesics based on type and severity of pain and evaluate response   Implement non-pharmacological measures as appropriate and evaluate response   Consider cultural and social influences on pain and pain management   Notify Licensed Independent Practitioner if interventions unsuccessful or patient reports new pain     Problem: Safety - Adult  Goal: Free from fall injury  Outcome: Progressing  Flowsheets (Taken 3/10/2025 1052)  Free from fall injury:   Instruct family/caregiver on patient safety   Based on caregiver fall risk screen, instruct family/caregiver to ask for assistance with transferring infant if caregiver noted to have fall risk factors     Problem: Discharge Planning  Goal: Discharge to home or other facility with appropriate resources  Outcome: Progressing  Flowsheets (Taken 3/10/2025 1052)  Discharge to home or other facility with appropriate resources:   Identify barriers to discharge with patient and caregiver   Arrange for needed discharge resources and transportation as appropriate   Identify discharge learning needs (meds, wound care, etc)   Arrange for interpreters to assist at discharge as needed   Refer to discharge planning if patient needs post-hospital services based on physician order or complex needs related to functional status, cognitive ability or social support system     Problem: Chronic Conditions and Co-morbidities  Goal: Patient's chronic conditions and co-morbidity symptoms are monitored and maintained or improved  Outcome: Progressing  Flowsheets  (Taken 3/10/2025 1052)  Care Plan - Patient's Chronic Conditions and Co-Morbidity Symptoms are Monitored and Maintained or Improved:   Monitor and assess patient's chronic conditions and comorbid symptoms for stability, deterioration, or improvement   Collaborate with multidisciplinary team to address chronic and comorbid conditions and prevent exacerbation or deterioration   Update acute care plan with appropriate goals if chronic or comorbid symptoms are exacerbated and prevent overall improvement and discharge     Problem: Nutrition  Goal: Nutrient intake appropriate for maintaining nutritional needs  Outcome: Progressing     Problem: Skin  Goal: Participates in plan/prevention/treatment measures  Outcome: Progressing  Flowsheets (Taken 3/12/2025 134)  Participates in plan/prevention/treatment measures:   Discuss with provider PT/OT consult   Elevate heels   Increase activity/out of bed for meals  Goal: Prevent/manage excess moisture  Outcome: Progressing  Flowsheets (Taken 3/12/2025 1349)  Prevent/manage excess moisture:   Cleanse incontinence/protect with barrier cream   Moisturize dry skin   Follow provider orders for dressing changes   Monitor for/manage infection if present  Goal: Prevent/minimize sheer/friction injuries  Outcome: Progressing  Flowsheets (Taken 3/12/2025 1341)  Prevent/minimize sheer/friction injuries:   Complete micro-shifts as needed if patient unable. Adjust patient position to relieve pressure points, not a full turn   Increase activity/out of bed for meals   Use pull sheet   HOB 30 degrees or less   Turn/reposition every 2 hours/use positioning/transfer devices   Utilize specialty bed per algorithm  Goal: Promote/optimize nutrition  Outcome: Progressing  Flowsheets (Taken 3/12/2025 9883)  Promote/optimize nutrition:   Assist with feeding   Monitor/record intake including meals   Consume > 50% meals/supplements   Offer water/supplements/favorite foods   Discuss with provider if NPO > 2  days   Reassess MST if dietician not consulted  Goal: Promote skin healing  Outcome: Progressing  Flowsheets (Taken 3/12/2025 3066)  Promote skin healing:   Protective dressings over bony prominences   Assess skin/pad under line(s)/device(s)   Turn/reposition every 2 hours/use positioning/transfer devices     Problem: Fall/Injury  Goal: Not fall by end of shift  Outcome: Progressing  Goal: Be free from injury by end of the shift  Outcome: Progressing  Goal: Verbalize understanding of personal risk factors for fall in the hospital  Outcome: Progressing  Goal: Verbalize understanding of risk factor reduction measures to prevent injury from fall in the home  Outcome: Progressing  Goal: Use assistive devices by end of the shift  Outcome: Progressing  Goal: Pace activities to prevent fatigue by end of the shift  Outcome: Progressing

## 2025-03-12 NOTE — PROGRESS NOTES
Occupational Therapy    OT Treatment    Patient Name: Norma Villasenor  MRN: [id number]  Today's Date: 3/12/2025  Time Calculation  Start Time: 1021  Stop Time: 1035  Time Calculation (min): 14 min       3131/3131-A    Assessment:  OT Assessment: Patient would benefit from additional skilled rehab at a low intensity to fully maximize independence in adls, activity tolerance for adls, and functional mobility tasks for adls.  Prognosis: Good  Evaluation/Treatment Tolerance: Patient tolerated treatment well  End of Session Communication: Bedside nurse  End of Session Patient Position: Up in chair, Alarm on  OT Assessment Results: Decreased ADL status, Decreased functional mobility  Prognosis: Good  Evaluation/Treatment Tolerance: Patient tolerated treatment well    Plan:  Treatment Interventions: ADL retraining, Functional transfer training  OT Frequency: 3 times per week  OT Discharge Recommendations: Low intensity level of continued care  Equipment Recommended upon Discharge: Wheeled walker  Treatment Interventions: ADL retraining, Functional transfer training  Subjective     Outcome Measures:[facility] Daily Activity  Putting on and taking off regular lower body clothing: A little  Bathing (including washing, rinsing, drying): A little  Putting on and taking off regular upper body clothing: A little  Toileting, which includes using toilet, bedpan or urinal: A little  Taking care of personal grooming such as brushing teeth: A little  Eating Meals: None  Daily Activity - Total Score: 19         03/12/25 1021   OT Last Visit   OT Received On 03/12/25   General   Reason for Referral activties of daily living   Patient Position Received   (Pt coming out of the bathroom without assistance. Therapist walked in for ambulation support.)   Preferred Learning Style verbal;visual   Precautions   Medical Precautions Fall precautions   Pain Assessment   0-10 (Numeric) Pain Score 0 - No pain   Cognition   Orientation Level Oriented X4    Grooming   Grooming Level of Assistance Setup   Grooming Where Assessed Standing sinkside   Grooming Comments completed oral care and washed face   UE Dressing   UE Dressing Level of Assistance Minimum assistance   UE Dressing Where Assessed Chair   UE Dressing Comments changed gown   Toileting   Toileting Comments pt was coming out of the bathroom upon arrival, aticipate pt was able to complete tia care independently   Functional Mobility   Functional Mobility Performed   (Pt ambulates in room at SBA with ww support. Pt was spotted up in room without assistance. Safety concerns.)   Transfers   Transfer Yes   Transfer 1   Transfer From 1 Stand to   Transfer to 1 Chair with arms   Transfer Device 1 Walker   Transfer Level of Assistance 1 Contact guard   Trials/Comments 1 cues for correct hand placement, reaching back for chair   IP OT Assessment   OT Assessment Patient would benefit from additional skilled rehab at a low intensity to fully maximize independence in adls, activity tolerance for adls, and functional mobility tasks for adls.   Prognosis Good   Evaluation/Treatment Tolerance Patient tolerated treatment well   End of Session Communication Bedside nurse   End of Session Patient Position Up in chair;Alarm on   OT Assessment   OT Assessment Results Decreased ADL status;Decreased functional mobility   Education   Individual(s) Educated Patient   Education Provided Fall precautons  (Pt was shown button on call button to push for help. Educted on importance of calling before getting up.)   Patient Response to Education Patient/Caregiver Verbalized Understanding of Information   Education Comment Pt would benefit from continued reinforcement   Inpatient Plan   Treatment Interventions ADL retraining;Functional transfer training   OT Frequency 3 times per week   OT Discharge Recommendations Low intensity level of continued care   Equipment Recommended upon Discharge Wheeled walker         Goals:  Encounter  Problems       Encounter Problems (Active)       OT Goals       Patient will complete UE adls with MOD I (Progressing)       Start:  03/09/25    Expected End:  03/23/25            Patient will complete LE adls with MOD I (Progressing)       Start:  03/09/25    Expected End:  03/23/25            Patient will complete transfers with MOD I (Progressing)       Start:  03/09/25    Expected End:  03/23/25            Patient will complete functional mobility tasks with MOD I (Progressing)       Start:  03/09/25    Expected End:  03/23/25

## 2025-03-12 NOTE — CARE PLAN
The patient's goals for the shift include      The clinical goals for the shift include pt will have no return of nausea or vomiting tis shift    Goals progressing, safety maintained, Tele placed for SB this night.

## 2025-03-12 NOTE — PROGRESS NOTES
Norma Villasenor is a 84 y.o. female on day 3 of admission presenting with Intractable nausea and vomiting.    Subjective   Patient says that she continues to feel well, she does feel a little bit weak especially in the mornings, her kidney function did improve and she had not been requiring any additional IV fluids, her diarrhea has stopped, she is still feeling a little bit nauseous but was able to tolerate a p.o. intake, she did well with physical therapy and will ultimately be going home, discussed with her son who was at bedside during the interview and examination the idea of healthy at home which she is interested in for her.    Objective     Physical Exam  Vitals reviewed.   Constitutional:       General: She is not in acute distress.     Appearance: Normal appearance. She is not ill-appearing.   HENT:      Head: Normocephalic and atraumatic.      Nose: Nose normal.      Mouth/Throat:      Mouth: Mucous membranes are moist.   Eyes:      General: No scleral icterus.  Cardiovascular:      Rate and Rhythm: Normal rate and regular rhythm.      Pulses: Normal pulses.      Heart sounds: Normal heart sounds. No murmur heard.     No friction rub. No gallop.   Pulmonary:      Effort: Pulmonary effort is normal. No respiratory distress.      Breath sounds: Normal breath sounds. No wheezing, rhonchi or rales.   Abdominal:      General: Abdomen is flat. Bowel sounds are normal. There is no distension.      Palpations: Abdomen is soft. There is no mass.      Tenderness: There is no abdominal tenderness. There is no guarding or rebound.   Musculoskeletal:         General: No swelling, tenderness or signs of injury.      Right lower leg: No edema.      Left lower leg: No edema.   Skin:     General: Skin is warm and dry.      Coloration: Skin is not jaundiced or pale.      Findings: No bruising, erythema, lesion or rash.   Neurological:      Mental Status: She is alert and oriented to person, place, and time. Mental status is  "at baseline.      Motor: Weakness present.   Psychiatric:         Mood and Affect: Mood normal.         Behavior: Behavior normal.         Thought Content: Thought content normal.         Judgment: Judgment normal.       Last Recorded Vitals  Blood pressure 107/53, pulse 54, temperature 37.1 °C (98.8 °F), resp. rate 21, height 1.473 m (4' 9.99\"), weight 46.5 kg (102 lb 8 oz), SpO2 97%.  Intake/Output last 3 Shifts:  I/O last 3 completed shifts:  In: 1982.1 (42.6 mL/kg) [P.O.:640; I.V.:592.1 (12.7 mL/kg); IV Piggyback:750]  Out: 200 (4.3 mL/kg) [Urine:200 (0.1 mL/kg/hr)]  Weight: 46.5 kg     Relevant Results  Scheduled medications  [Held by provider] allopurinol, 100 mg, oral, Daily  amLODIPine, 5 mg, oral, Daily  apixaban, 2.5 mg, oral, q12h  aspirin, 81 mg, oral, Daily  budesonide, 0.5 mg, nebulization, BID  dronedarone, 400 mg, oral, BID  formoterol, 20 mcg, nebulization, BID  [Held by provider] gabapentin, 100 mg, oral, Nightly  metoprolol succinate XL, 50 mg, oral, Nightly  pantoprazole, 40 mg, oral, Daily  potassium chloride CR, 20 mEq, oral, Daily  rosuvastatin, 5 mg, oral, Nightly  tiZANidine, 2 mg, oral, Nightly      Continuous medications       PRN medications  PRN medications: benzocaine-menthol, dicyclomine, loperamide, meclizine, ondansetron **OR** ondansetron, prochlorperazine    Results for orders placed or performed during the hospital encounter of 03/08/25 (from the past 24 hours)   Electrocardiogram, 12-lead PRN ACS symptoms   Result Value Ref Range    Ventricular Rate 41 BPM    Atrial Rate 43 BPM    QRS Duration 80 ms    QT Interval 502 ms    QTC Calculation(Bazett) 414 ms    R Axis 56 degrees    T Axis 62 degrees    QRS Count 7 beats    Q Onset 220 ms    T Offset 471 ms    QTC Fredericia 442 ms   Renal Function Panel   Result Value Ref Range    Glucose 91 74 - 99 mg/dL    Sodium 134 (L) 136 - 145 mmol/L    Potassium 4.6 3.5 - 5.3 mmol/L    Chloride 107 98 - 107 mmol/L    Bicarbonate 21 21 - 32 " mmol/L    Anion Gap 11 10 - 20 mmol/L    Urea Nitrogen 26 (H) 6 - 23 mg/dL    Creatinine 1.48 (H) 0.50 - 1.05 mg/dL    eGFR 35 (L) >60 mL/min/1.73m*2    Calcium 8.4 (L) 8.6 - 10.3 mg/dL    Phosphorus 2.0 (L) 2.5 - 4.9 mg/dL    Albumin 2.7 (L) 3.4 - 5.0 g/dL   Magnesium   Result Value Ref Range    Magnesium 1.48 (L) 1.60 - 2.40 mg/dL   CBC   Result Value Ref Range    WBC 7.5 4.4 - 11.3 x10*3/uL    nRBC 0.0 0.0 - 0.0 /100 WBCs    RBC 3.07 (L) 4.00 - 5.20 x10*6/uL    Hemoglobin 9.2 (L) 12.0 - 16.0 g/dL    Hematocrit 29.5 (L) 36.0 - 46.0 %    MCV 96 80 - 100 fL    MCH 30.0 26.0 - 34.0 pg    MCHC 31.2 (L) 32.0 - 36.0 g/dL    RDW 14.3 11.5 - 14.5 %    Platelets 168 150 - 450 x10*3/uL     ECG 12 lead    Result Date: 3/10/2025  Normal sinus rhythm Nonspecific ST and T wave abnormality Abnormal ECG When compared with ECG of 28-FEB-2025 16:50, Premature atrial complexes are no longer Present Nonspecific T wave abnormality no longer evident in Anterior leads    CT abdomen pelvis wo IV contrast    Result Date: 3/9/2025  Interpreted By:  Floridalma Echeverria, STUDY: CT ABDOMEN PELVIS WO IV CONTRAST;  3/9/2025 3:29 am   INDICATION: Signs/Symptoms:ULI, persistent vomiting, recent UTI.   COMPARISON: 01/19/2023   ACCESSION NUMBER(S): [id number]   ORDERING CLINICIAN: JOANA DA SILVA   TECHNIQUE: Axial noncontrast CT images of the abdomen and pelvis with coronal and sagittal reconstructed images.   FINDINGS: LOWER CHEST: No acute abnormality of the lung bases. Severe coronary artery calcifications. small hiatal hernia. BONES: No acute osseous abnormality. Grade 1 anterolisthesis of L4 on L5, likely on a degenerative basis. Multilevel degenerative disc changes. Severe lower lumbar facet arthropathy. Severe left hip osteoarthrosis. ABDOMINAL WALL: Within normal limits.   ABDOMEN: Lack of intravenous contrast limits evaluation of vessels and solid organs. LIVER: 1.2 cm cyst in the caudate lobe. Additional subcentimeter hypodense lesion in the  right lobe is too small to characterize, but likely a small cyst. BILE DUCTS: Stable mild extrahepatic biliary dilatation likely due to cholecystectomy changes. GALLBLADDER: Cholecystectomy. PANCREAS: No peripancreatic inflammatory stranding or duct dilatation. SPLEEN: Within normal limits. ADRENALS: Within normal limits.   KIDNEYS, URETERS, URINARY BLADDER: No hydronephrosis or urinary tract calculus. Bilateral renal cysts measuring up to 2.3 cm on the right and 1.9 cm on the left. The urinary bladder is unremarkable.   VESSELS: No aortic aneurysm. RETROPERITONEUM: No pathologically enlarged lymph nodes.   PELVIS:   REPRODUCTIVE ORGANS: Calcified uterine fibroids. No significant free pelvic fluid.   BOWEL: No dilated bowel. Colonic diverticulosis without acute diverticulitis. Normal appendix. PERITONEUM: No ascites or free air, no fluid collection.       No acute abdominal or pelvic process.       MACRO: None   Signed by: Floridalma Echeverria 3/9/2025 3:43 AM Dictation workstation:   [id number]    ECG 12 lead    Result Date: 3/7/2025  Sinus rhythm with Premature atrial complexes Otherwise normal ECG When compared with ECG of 10-FEB-2025 09:23, Premature atrial complexes are now Present Confirmed by TORIN Hodge ([id number]) on 3/7/2025 5:07:38 PM    XR chest 1 view    Result Date: 2/28/2025  STUDY: Chest Radiograph;  2/28/2025 5:02PM INDICATION: Vomiting. COMPARISON: 2/7/2025 XR Chest. ACCESSION NUMBER(S): [id number] ORDERING CLINICIAN: MARK SUMMERS TECHNIQUE:  Frontal chest was obtained at 17:02 hours. FINDINGS: CARDIOMEDIASTINAL SILHOUETTE: Cardiomediastinal silhouette is normal in size and configuration.  LUNGS: Lungs are clear.  ABDOMEN: No remarkable upper abdominal findings.  BONES: No acute osseous changes.    No radiographic evidence of acute cardiopulmonary disease. Signed by Karri Matthews MD    Electrocardiogram, 12-lead PRN ACS symptoms    Result Date: 2/19/2025  Normal sinus rhythm Normal ECG When compared with  ECG of 09-FEB-2025 10:41, T wave amplitude has decreased in Anterior leads Confirmed by Lexa Villeda ([id number]) on 2/19/2025 1:34:58 PM    Electrocardiogram, 12-lead PRN ACS symptoms    Result Date: 2/19/2025  Normal sinus rhythm Normal ECG When compared with ECG of 09-FEB-2025 10:41, (unconfirmed) T wave amplitude has decreased in Anterior leads Confirmed by Lexa Villeda ([id number]) on 2/19/2025 1:34:55 PM    ECG 12 lead    Result Date: 2/19/2025  Suspect arm lead reversal, interpretation assumes no reversal Atrial fibrillation with rapid ventricular response Lateral infarct , age undetermined Inferior infarct , age undetermined Abnormal ECG When compared with ECG of 08-FEB-2025 12:14, (unconfirmed) Significant changes have occurred Confirmed by Lexa Villeda ([id number]) on 2/19/2025 12:34:05 PM    ECG 12 lead    Result Date: 2/19/2025  Sinus bradycardia with frequent and consecutive Premature ventricular complexes and Possible Premature atrial complexes with Aberrant conduction Abnormal ECG When compared with ECG of 08-FEB-2025 12:13, (unconfirmed) Fusion complexes are no longer Present Premature ventricular complexes are now Present Right bundle branch block is no longer Present Confirmed by Lexa Villeda ([id number]) on 2/19/2025 12:34:02 PM    ECG 12 lead    Result Date: 2/17/2025  Sinus tachycardia with Premature supraventricular complexes and Fusion complexes Right bundle branch block When compared with ECG of 08-FEB-2025 09:06, Sinus rhythm has replaced Atrial fibrillation Right bundle branch block is now Present Reconfirmed by Prabhjot Trejo ([id number]) on 2/17/2025 9:38:12 AM    Electrocardiogram, 12-lead PRN ACS symptoms    Result Date: 2/10/2025  Normal sinus rhythm Increased R/S ratio in V1, consider early transition or posterior infarct When compared with ECG of 08-FEB-2025 21:26, (unconfirmed) Significant changes have occurred Reconfirmed by Prabhjot Trejo ([id number]) on 2/10/2025 6:48:25 PM    Transthoracic Echo (TTE)  Complete    Result Date: 2/10/2025            [facility] [address], [facility] [address]    Tel [phone] Fax [phone] TRANSTHORACIC ECHOCARDIOGRAM REPORT Patient Name:       ANNIA TUCKER         Reading Physician:    [id number] Lexa Villeda MD Study Date:         2/9/2025              Ordering Provider:    [id number] RICARDO MARCUS MRN/PID:            [id number]              Fellow: Accession#:         [id number]          Nurse: Date of Birth/Age:  1940 / 84 years Sonographer:          Figueroa Melo RDCS Gender Assigned at  F                     Additional Staff: Birth: Height:             149.00 cm             Admit Date: Weight:             53.00 kg              Admission Status: BSA / BMI:          1.46 m2 / 23.87 kg/m2 Department Location: Blood Pressure: 121 /60 mmHg Study Type:    TRANSTHORACIC ECHO (TTE) COMPLETE Diagnosis/ICD: Unspecified atrial fibrillation-I48.91 CPT Codes:     Echo Complete w Full Doppler-[id number]  Study Detail: The following Echo studies were performed: 2D, Doppler, M-Mode and               color flow.  PHYSICIAN INTERPRETATION: Left Ventricle: The left ventricular systolic function is normal, with a visually estimated ejection fraction of 60-65%. There is mild concentric left ventricular hypertrophy. There are no regional wall motion abnormalities. The left ventricular cavity size is normal. There is mild increased septal and mildly increased posterior left ventricular wall thickness. There is left ventricular concentric remodeling. Spectral Doppler shows a Grade I (impaired relaxation pattern) of left ventricular diastolic filling with normal left atrial filling pressure. Left Atrium: The left atrial size is moderate to severely dilated. Right Ventricle: The right ventricle  is normal in size. There is normal right ventricular global systolic function. Right Atrium: The right atrial size is normal. Aortic Valve: The aortic valve is trileaflet. There is minimal aortic valve cusp calcification. There is evidence of mildly elevated transaortic gradients consistent with sclerosis of the aortic valve. There is no evidence of aortic valve regurgitation. The peak instantaneous gradient of the aortic valve is 8 mmHg. Mitral Valve: The mitral valve is mildly thickened. There is mild to moderate mitral annular calcification. There is trace mitral valve regurgitation. Tricuspid Valve: The tricuspid valve is structurally normal. There is trace tricuspid regurgitation. The Doppler estimated RVSP is within normal limits at 26.4 mmHg. Pulmonic Valve: The pulmonic valve is structurally normal. There is no indication of pulmonic valve regurgitation. Pericardium: No pericardial effusion noted. Aorta: The aortic root is normal. In comparison to the previous echocardiogram(s): There are no prior studies on this patient for comparison purposes.  CONCLUSIONS:  1. The left ventricular systolic function is normal, with a visually estimated ejection fraction of 60-65%.  2. Spectral Doppler shows a Grade I (impaired relaxation pattern) of left ventricular diastolic filling with normal left atrial filling pressure.  3. The left atrial size is moderate to severely dilated.  4. Right ventricular systolic pressure is within normal limits.  5. Aortic valve sclerosis. QUANTITATIVE DATA SUMMARY:  2D MEASUREMENTS:             Normal Ranges: LAs:             4.20 cm     (2.7-4.0cm) IVSd:            1.00 cm     (0.6-1.1cm) LVPWd:           1.10 cm     (0.6-1.1cm) LVIDd:           3.80 cm     (3.9-5.9cm) LV Mass Index:   86.1 g/m2 LVEDV Index:     33.39 ml/m2  LEFT ATRIUM:                Normal Ranges: LA Volume Index: 27.0 ml/m2  M-MODE MEASUREMENTS:         Normal Ranges: Ao Root:             2.40 cm (2.0-3.7cm) AoV Exc:              1.60 cm (1.5-2.5cm)  AORTA MEASUREMENTS:         Normal Ranges: AoV Exc:            1.60 cm (1.5-2.5cm) Asc Ao, d:          2.80 cm (2.1-3.4cm)  LV SYSTOLIC FUNCTION:                      Normal Ranges: EF-A4C View:    56 % (>=55%) EF-A2C View:    68 % EF-Biplane:     61 % EF-Visual:      63 % LV EF Reported: 63 %  LV DIASTOLIC FUNCTION:           Normal Ranges: MV Peak E:             0.74 m/s  (0.7-1.2 m/s) MV Peak A:             1.03 m/s  (0.42-0.7 m/s) E/A Ratio:             0.72      (1.0-2.2) MV e'                  0.063 m/s (>8.0) MV lateral e'          0.06 m/s MV medial e'           0.07 m/s E/e' Ratio:            11.76     (<8.0)  MITRAL VALVE:          Normal Ranges: MV DT:        261 msec (150-240msec)  AORTIC VALVE:           Normal Ranges: AoV Vmax:      1.39 m/s (<=1.7m/s) AoV Peak P.7 mmHg (<20mmHg) LVOT Max Norm:  0.99 m/s (<=1.1m/s) LVOT Diameter: 1.90 cm  (1.8-2.4cm) AoV Area,Vmax: 2.02 cm2 (2.5-4.5cm2)  RIGHT VENTRICLE: RV Basal 3.65 cm RV Mid   2.84 cm RV Major 5.1 cm TAPSE:   17.2 mm RV s'    0.20 m/s  TRICUSPID VALVE/RVSP:          Normal Ranges: Peak TR Velocity:     2.42 m/s RV Syst Pressure:     26 mmHg  (< 30mmHg)  [id number] Lexa Villeda MD Electronically signed on 2/10/2025 at 8:13:52 AM  ** Final **      Assessment/Plan   This is a very pleasant 84-year-old lady known to writer from hospital admission 1 month prior for influenza complicated by ULI and atrial fibrillation that had all resolved, she presented to the emergency department a week prior with symptoms of acute cystitis, was diagnosed with a E. coli UTI and discharged appropriately on Bactrim, as the cultures from the urine did show sensitivity with Bactrim; she had intractable nausea and vomiting in the days after that, she came to the hospital and ultimately was admitted for observation and found to have a more severe ULI of prerenal etiology that is improving with IV fluid resuscitation initially, although  slightly worse again yesterday, but that has improved actually as the diarrhea has stopped and p.o. intake is improved.  Assessment & Plan  Intractable nausea and vomiting  ULI with prerenal etiology, improved  Hx of HTN    Plan:  - Continue inpatient admission; no additional need for any anti-infectives  - Discontinue IV fluid resuscitation, continue as needed Imodium  if there is additional diarrhea  -Continue to encourage p.o. intake, continue with regular diet with low sodium  - Add nutrition supplements  - Anticipate a need for discharge home with home health care and healthy at home including PT/OT, which should be as soon as tomorrow    Diet: Regular, low-sodium  VTE PPx: Eliquis  Code: Full           I spent 35 minutes in the professional and overall care of this patient.    Tan Mackenzie MD

## 2025-03-13 ENCOUNTER — HOME HEALTH ADMISSION (OUTPATIENT)
Dept: HOME HEALTH SERVICES | Facility: HOME HEALTH | Age: 85
End: 2025-03-13
Payer: MEDICARE

## 2025-03-13 ENCOUNTER — DOCUMENTATION (OUTPATIENT)
Dept: HOME HEALTH SERVICES | Facility: HOME HEALTH | Age: 85
End: 2025-03-13
Payer: MEDICARE

## 2025-03-13 VITALS
TEMPERATURE: 97.7 F | BODY MASS INDEX: 21.51 KG/M2 | WEIGHT: 102.5 LBS | HEART RATE: 62 BPM | DIASTOLIC BLOOD PRESSURE: 72 MMHG | HEIGHT: 58 IN | OXYGEN SATURATION: 98 % | SYSTOLIC BLOOD PRESSURE: 155 MMHG | RESPIRATION RATE: 16 BRPM

## 2025-03-13 LAB
ALBUMIN SERPL BCP-MCNC: 3 G/DL (ref 3.4–5)
ANION GAP SERPL CALC-SCNC: 11 MMOL/L (ref 10–20)
ATRIAL RATE: 43 BPM
ATRIAL RATE: 79 BPM
BACTERIA BLD CULT: NORMAL
BACTERIA BLD CULT: NORMAL
BUN SERPL-MCNC: 18 MG/DL (ref 6–23)
CALCIUM SERPL-MCNC: 8.6 MG/DL (ref 8.6–10.3)
CHLORIDE SERPL-SCNC: 108 MMOL/L (ref 98–107)
CO2 SERPL-SCNC: 23 MMOL/L (ref 21–32)
CREAT SERPL-MCNC: 1.14 MG/DL (ref 0.5–1.05)
EGFRCR SERPLBLD CKD-EPI 2021: 48 ML/MIN/1.73M*2
ERYTHROCYTE [DISTWIDTH] IN BLOOD BY AUTOMATED COUNT: 14.2 % (ref 11.5–14.5)
GLUCOSE SERPL-MCNC: 90 MG/DL (ref 74–99)
HCT VFR BLD AUTO: 29.9 % (ref 36–46)
HGB BLD-MCNC: 9.4 G/DL (ref 12–16)
MAGNESIUM SERPL-MCNC: 1.42 MG/DL (ref 1.6–2.4)
MCH RBC QN AUTO: 30.4 PG (ref 26–34)
MCHC RBC AUTO-ENTMCNC: 31.4 G/DL (ref 32–36)
MCV RBC AUTO: 97 FL (ref 80–100)
NRBC BLD-RTO: 0 /100 WBCS (ref 0–0)
P AXIS: 60 DEGREES
P OFFSET: 210 MS
P ONSET: 158 MS
PHOSPHATE SERPL-MCNC: 2.3 MG/DL (ref 2.5–4.9)
PLATELET # BLD AUTO: 174 X10*3/UL (ref 150–450)
POTASSIUM SERPL-SCNC: 4.3 MMOL/L (ref 3.5–5.3)
PR INTERVAL: 120 MS
Q ONSET: 218 MS
Q ONSET: 220 MS
QRS COUNT: 13 BEATS
QRS COUNT: 7 BEATS
QRS DURATION: 80 MS
QRS DURATION: 86 MS
QT INTERVAL: 374 MS
QT INTERVAL: 502 MS
QTC CALCULATION(BAZETT): 414 MS
QTC CALCULATION(BAZETT): 428 MS
QTC FREDERICIA: 410 MS
QTC FREDERICIA: 442 MS
R AXIS: 31 DEGREES
R AXIS: 56 DEGREES
RBC # BLD AUTO: 3.09 X10*6/UL (ref 4–5.2)
SODIUM SERPL-SCNC: 138 MMOL/L (ref 136–145)
T AXIS: 62 DEGREES
T AXIS: 67 DEGREES
T OFFSET: 405 MS
T OFFSET: 471 MS
VENTRICULAR RATE: 41 BPM
VENTRICULAR RATE: 79 BPM
WBC # BLD AUTO: 6.6 X10*3/UL (ref 4.4–11.3)

## 2025-03-13 PROCEDURE — 94640 AIRWAY INHALATION TREATMENT: CPT

## 2025-03-13 PROCEDURE — 2500000004 HC RX 250 GENERAL PHARMACY W/ HCPCS (ALT 636 FOR OP/ED): Performed by: INTERNAL MEDICINE

## 2025-03-13 PROCEDURE — 80069 RENAL FUNCTION PANEL: CPT

## 2025-03-13 PROCEDURE — 36415 COLL VENOUS BLD VENIPUNCTURE: CPT

## 2025-03-13 PROCEDURE — 2500000001 HC RX 250 WO HCPCS SELF ADMINISTERED DRUGS (ALT 637 FOR MEDICARE OP): Performed by: INTERNAL MEDICINE

## 2025-03-13 PROCEDURE — 2500000002 HC RX 250 W HCPCS SELF ADMINISTERED DRUGS (ALT 637 FOR MEDICARE OP, ALT 636 FOR OP/ED): Performed by: INTERNAL MEDICINE

## 2025-03-13 PROCEDURE — 99239 HOSP IP/OBS DSCHRG MGMT >30: CPT | Performed by: INTERNAL MEDICINE

## 2025-03-13 PROCEDURE — 83735 ASSAY OF MAGNESIUM: CPT

## 2025-03-13 PROCEDURE — 85027 COMPLETE CBC AUTOMATED: CPT

## 2025-03-13 RX ORDER — MAGNESIUM SULFATE HEPTAHYDRATE 40 MG/ML
4 INJECTION, SOLUTION INTRAVENOUS ONCE
Status: COMPLETED | OUTPATIENT
Start: 2025-03-13 | End: 2025-03-13

## 2025-03-13 RX ADMIN — POTASSIUM CHLORIDE 20 MEQ: 1500 TABLET, EXTENDED RELEASE ORAL at 08:32

## 2025-03-13 RX ADMIN — FORMOTEROL FUMARATE 20 MCG: 20 SOLUTION RESPIRATORY (INHALATION) at 08:59

## 2025-03-13 RX ADMIN — APIXABAN 2.5 MG: 2.5 TABLET, FILM COATED ORAL at 00:43

## 2025-03-13 RX ADMIN — ASPIRIN 81 MG: 81 TABLET, COATED ORAL at 08:33

## 2025-03-13 RX ADMIN — PANTOPRAZOLE SODIUM 40 MG: 40 TABLET, DELAYED RELEASE ORAL at 08:32

## 2025-03-13 RX ADMIN — APIXABAN 2.5 MG: 2.5 TABLET, FILM COATED ORAL at 12:59

## 2025-03-13 RX ADMIN — AMLODIPINE BESYLATE 5 MG: 5 TABLET ORAL at 08:32

## 2025-03-13 RX ADMIN — DRONEDARONE 400 MG: 400 TABLET, FILM COATED ORAL at 08:32

## 2025-03-13 RX ADMIN — MAGNESIUM SULFATE HEPTAHYDRATE 4 G: 40 INJECTION, SOLUTION INTRAVENOUS at 09:10

## 2025-03-13 RX ADMIN — BUDESONIDE 0.5 MG: 0.5 INHALANT RESPIRATORY (INHALATION) at 08:59

## 2025-03-13 ASSESSMENT — COGNITIVE AND FUNCTIONAL STATUS - GENERAL
WALKING IN HOSPITAL ROOM: A LITTLE
TOILETING: A LITTLE
MOVING TO AND FROM BED TO CHAIR: A LITTLE
DAILY ACTIVITIY SCORE: 23
MOBILITY SCORE: 21
CLIMB 3 TO 5 STEPS WITH RAILING: A LITTLE

## 2025-03-13 ASSESSMENT — PAIN SCALES - GENERAL: PAINLEVEL_OUTOF10: 0 - NO PAIN

## 2025-03-13 NOTE — PROGRESS NOTES
Spiritual Care Visit  Spiritual Care Request    Reason for Visit:  Continue Visiting: Yes     Request Received From:       Focus of Care:  Visited With: Patient not available (Yesterday I visited the patient.)         Refer to :          Spiritual Care Assessment    Spiritual Assessment:                      Care Provided:       Sense of Community and or Roman Catholic Affiliation:  Uatsdin         Addressed Needs/Concerns and/or Patricio Through:          Outcome:        Advance Directives:         Spiritual Care Annotation    Annotation:  Yesterday I tried to visit the patient, but she was asleep. I'll try again.

## 2025-03-13 NOTE — DISCHARGE SUMMARY
"Discharge Diagnosis  Intractable nausea and vomiting    Issues Requiring Follow-Up  It was a pleasure to meet you in the hospital  You were diagnosed with having dehydration and an acute kidney injury related to poor intake of food and water  Fortunately your kidney function improved with supportive measures like IV fluids and holding some of your regularly scheduled medicines  Your hospital course was complicated by also having episodes of diarrhea that resolved as well  You are not having any diarrhea and tolerating all of your usual medications as well as having a good appetite, you are cleared for being able to discharge home  You were evaluated by physical and Occupational Therapy prior to being discharged, although you are okay to go home, we will set up physical and Occupational Therapy for strength building through home care, and given that you are in between primary care providers, we will set you up for a program called \"healthy at home\" that we discussed with you and your son Jose  While you are in the hospital, you missed a follow-up appointment with Dr. Villeda, this has been rescheduled for 3/19/2025, please keep that appointment as changed  Your son Jose, is in the process of getting you in with a new primary care physician through [facility] [place], Jose will let you know when that appointment is scheduled for; Dr. Tan Mackenzie spoke with Jose on the day of discharge over the telephone at 1:00 PM to confirm this    Test Results Pending At Discharge  Pending Labs       No current pending labs.          Hospital Course  This is a very pleasant 84-year-old lady known to writer from hospital admission 1 month prior for influenza complicated by ULI and atrial fibrillation that had all resolved, she presented to the emergency department a week prior with symptoms of acute cystitis, was diagnosed with a E. coli UTI and discharged appropriately on Bactrim, as the cultures from the urine did show " sensitivity with Bactrim; she had intractable nausea and vomiting in the days after that, she came to the hospital and ultimately was admitted for observation and found to have a more severe ULI of prerenal etiology that is improving with IV fluid resuscitation initially, although slightly worse which was attributed to a separate prerenal etiology of having diarrhea; she was given loperamide as needed with resolution of her symptoms, she was tolerating a p.o. diet, her Multaq was held transiently on the morning of 3/11/2025 due to the ULI, she actually improved significantly and was tolerating p.o. diet, no longer having diarrhea and had resolution of her ULI, worked with PT and OT and determined to be an appropriate candidate for home-going with home care, she will be set up for healthy at home and is in the process of reestablishing with a new  primary care physician.    Greater than 30 minutes was spent facilitating this patients discharge from the hospital which included examining the patient, reconciling medications, and making arrangements for future care.    Tan Mackenzie MD  [facility]  Internal Medicine    This document was generated in whole or in part using the Dragon One medical voice recognition software and there may be some incorrect words/wording, spelling, or punctuation errors that were not corrected prior to finalization in the medical record.    Pertinent Physical Exam At Time of Discharge  Physical Exam  Vitals reviewed.   Constitutional:       General: She is not in acute distress.     Appearance: Normal appearance. She is not ill-appearing.   HENT:      Head: Normocephalic and atraumatic.      Nose: Nose normal.      Mouth/Throat:      Mouth: Mucous membranes are moist.   Eyes:      General: No scleral icterus.  Cardiovascular:      Rate and Rhythm: Normal rate and regular rhythm.      Pulses: Normal pulses.      Heart sounds: Normal heart sounds. No murmur heard.     No  friction rub. No gallop.   Pulmonary:      Effort: Pulmonary effort is normal. No respiratory distress.      Breath sounds: Normal breath sounds. No wheezing, rhonchi or rales.   Abdominal:      General: Abdomen is flat. Bowel sounds are normal. There is no distension.      Palpations: Abdomen is soft. There is no mass.      Tenderness: There is no abdominal tenderness. There is no guarding or rebound.   Musculoskeletal:         General: No swelling, tenderness or signs of injury.      Right lower leg: No edema.      Left lower leg: No edema.   Skin:     General: Skin is warm and dry.      Coloration: Skin is not jaundiced or pale.      Findings: No bruising, erythema, lesion or rash.   Neurological:      Mental Status: She is alert and oriented to person, place, and time. Mental status is at baseline.      Motor: Weakness present.   Psychiatric:         Mood and Affect: Mood normal.         Behavior: Behavior normal.         Thought Content: Thought content normal.         Judgment: Judgment normal.     Home Medications     Medication List      ASK your doctor about these medications     allopurinol 100 mg tablet; Commonly known as: Zyloprim   amLODIPine 5 mg tablet; Commonly known as: Norvasc; TAKE 1 TABLET ONE   TIME DAILY   aspirin 81 mg EC tablet; Take 1 tablet (81 mg) by mouth once daily.   Breo Ellipta 200-25 mcg/dose inhaler; Generic drug: fluticasone   furoate-vilanteroL   calcitriol 0.25 mcg capsule; Commonly known as: Rocaltrol   dicyclomine 10 mg capsule; Commonly known as: Bentyl   Eliquis 2.5 mg tablet; Generic drug: apixaban; Take 1 tablet (2.5 mg) by   mouth every 12 hours.   gabapentin 100 mg capsule; Commonly known as: Neurontin   meclizine 25 mg tablet; Commonly known as: Antivert   metoprolol succinate XL 50 mg 24 hr tablet; Commonly known as: Toprol-XL   Multaq 400 mg tablet; Generic drug: dronedarone; Take 1 tablet (400 mg)   by mouth 2 times a day.   pantoprazole 40 mg EC tablet; Commonly  known as: ProtoNix   potassium chloride CR 20 mEq ER tablet; Commonly known as: Klor-Con M20   prochlorperazine 5 mg tablet; Commonly known as: Compazine; Take 1   tablet (5 mg) by mouth every 6 hours if needed for nausea or vomiting for   up to 3 days.; Ask about: Should I take this medication?   rosuvastatin 5 mg tablet; Commonly known as: Crestor   sulfamethoxazole-trimethoprim 800-160 mg tablet; Commonly known as:   Bactrim DS; Take 1 tablet by mouth 2 times a day for 7 days.; Ask about:   Should I take this medication?   tiZANidine 2 mg tablet; Commonly known as: Zanaflex       Outpatient Follow-Up  Future Appointments   Date Time Provider Department Center   3/19/2025 10:40 AM Lexa Villeda MD [id number] [place]       Tan Mackenzie MD

## 2025-03-13 NOTE — DISCHARGE INSTRUCTIONS
"It was a pleasure to meet you in the hospital  You were diagnosed with having dehydration and an acute kidney injury related to poor intake of food and water  Fortunately your kidney function improved with supportive measures like IV fluids and holding some of your regularly scheduled medicines  Your hospital course was complicated by also having episodes of diarrhea that resolved as well  You are not having any diarrhea and tolerating all of your usual medications as well as having a good appetite, you are cleared for being able to discharge home  You were evaluated by physical and Occupational Therapy prior to being discharged, although you are okay to go home, we will set up physical and Occupational Therapy for strength building through home care, and given that you are in between primary care providers, we will set you up for a program called \"healthy at home\" that we discussed with you and your son Jose  While you are in the hospital, you missed a follow-up appointment with Dr. Villeda, this has been rescheduled for 3/19/2025, please keep that appointment as changed  Your son Jose, is in the process of getting you in with a new primary care physician through [facility], Jose will let you know when that appointment is scheduled for; Dr. Tan Mackenzie spoke with Jose on the day of discharge over the telephone at 1:00 PM to confirm this  "

## 2025-03-13 NOTE — HH CARE COORDINATION
Home Care received a Referral for Physical Therapy and Occupational Therapy. We have processed the referral for a Start of Care on 03/14/2025.     If you have any questions or concerns, please feel free to contact us at [phone]. Follow the prompts, enter your five digit zip code, and you will be directed to your care team on [facility] 2.

## 2025-03-13 NOTE — PROGRESS NOTES
"Nutrition Initial Assessment:   Nutrition Assessment    Reason for Assessment: Dietitian discretion (Pt down 16 lbs since last seen - last month.)    Patient is a 84 y.o. female presenting to [facility] ED C/O generalized weakness multiple episodes of nonbloody emesis.     Pt on day 5 of admission and finally feeling better and eating better as well. She is ordering the salmon, pot roast, salad and plenty of fluids.     Past Medical History  She has a past medical history of GERD (gastroesophageal reflux disease) and Hypertension.     Surgical History  She has a past surgical history that includes Other surgical history; Coronary stent placement; Colonoscopy; Upper gastrointestinal endoscopy; and Cholecystectomy.    Nutrition History:  Food and Nutrient History: Pt seen by this Dietitian just recently at her last admit to [facility]. She explains that she never really got better after she went home. She couldn't eat and anything she did eat, she vomitted. She lost more weight putting her at her lowest weight of 102 lbs (usual weight is 120 lbs). She is aware of her weight loss and concerned. She seems to now have prioritized eating and drinking with motivation to get better. she is not really a fan of the Ensure, but willing to keep sipping at it to help with gaining back her weight and strength.  Food Intolerance:  (Pt dislikes cheese.)       Anthropometrics:  Height: 147.3 cm (4' 9.99\")   Weight: 46.5 kg (102 lb 8 oz)   BMI (Calculated): 21.43  IBW/kg (Dietitian Calculated): 40.81 kg  Percent of IBW: 113.94 %       Weight History:   Wt Readings from Last 10 Encounters:   03/09/25 46.5 kg (102 lb 8 oz)   02/28/25 50.3 kg (111 lb)   02/07/25 53.5 kg (118 lb)   10/03/23 58.9 kg (129 lb 13.6 oz)       Weight Change %:  Weight History / % Weight Change: significant wieght loss of 9 lbs (8%) in 9 days  Significant Weight Loss: Yes    Nutrition Focused Physical Exam Findings:    Subcutaneous Fat Loss:   Orbital Fat Pads: " "Mild-Moderate (slight dark circles and slight hollowing)  Buccal Fat Pads: Mild-Moderate (flat cheeks, minimal bounce)  Triceps: Defer  Ribs: Defer  Muscle Wasting:  Temporalis: Mild-Moderate (slight depression)  Pectoralis (Clavicular Region): Well nourished (clavicle not visible)  Deltoid/Trapezius: Well nourished (rounded appearance at arm, shoulder, neck)  Interosseous: Mild-Moderate (slightly depressed area between thumb and forefinger)  Trapezius/Infraspinatus/Supraspinatus (Scapular Region): Defer  Quadriceps: Defer  Gastrocnemius: Defer  Edema:  Edema: none  Physical Findings:  Hair: Negative  Eyes: Negative  Nails: Negative  Skin: Negative  Respiratory : Negative  Digestive System Findings: Nausea  Teeth Findings: Edentulous (upper and lower dentures)    Nutrition Significant Labs:  CBC Trend:   Results from last 7 days   Lab Units 03/13/25  0547 03/12/25  0620 03/11/25  0756 03/10/25  0725   WBC AUTO x10*3/uL 6.6 7.5 6.2 6.4   RBC AUTO x10*6/uL 3.09* 3.07* 3.53* 3.58*   HEMOGLOBIN g/dL 9.4* 9.2* 10.6* 10.7*   HEMATOCRIT % 29.9* 29.5* 33.3* 32.5*   MCV fL 97 96 94 91   PLATELETS AUTO x10*3/uL 174 168 219 228    , BMP Trend:   Results from last 7 days   Lab Units 03/13/25  0547 03/12/25  0620 03/11/25  1442 03/11/25  0756   GLUCOSE mg/dL 90 91 85 82   CALCIUM mg/dL 8.6 8.4* 8.3* 8.7   SODIUM mmol/L 138 134* 136 137   POTASSIUM mmol/L 4.3 4.6 4.3 4.2   CO2 mmol/L 23 21 25 27   CHLORIDE mmol/L 108* 107 106 107   BUN mg/dL 18 26* 24* 22   CREATININE mg/dL 1.14* 1.48* 1.63* 1.42*          I/O:   Last BM Date: 03/09/25; Stool Appearance: Loose (03/10/25 1140)    Dietary Orders (From admission, onward)       Start     Ordered    03/13/25 0857  Oral nutritional supplements  Until discontinued        Question Answer Comment   Deliver with Breakfast    Deliver with Dinner    Select supplement: Ensure Plus High Protein       Placed in \"And\" Linked Group    03/13/25 0856    03/13/25 0857  Oral nutritional supplements  " "Until discontinued        Question Answer Comment   Deliver with Lunch    Select supplement: Magic Cup       Placed in \"And\" Linked Group    03/13/25 0856    03/10/25 1108  Adult diet Regular  Diet effective now        Question:  Diet type  Answer:  Regular    03/10/25 1107    03/09/25 0608  May Participate in Room Service With Assistance  ( ROOM SERVICE MAY PARTICIPATE WITH ASSISTANCE)  Once        Question:  .  Answer:  Yes    03/09/25 0607                     Estimated Needs:   Total Energy Estimated Needs in 24 hours (kCal):  (8301-4525 kcal (25-30 kcal/kg))     Total Protein Estimated Needs in 24 Hours (g):  (47-56g protein (1.0-1.2g/kg))     Total Fluid Estimated Needs in 24 Hours (mL): 1400 mL  Method for Estimating 24 Hour Fluid Needs: 30 ml/kg  Patient on Order Fluid Restriction: No        Nutrition Diagnosis   Malnutrition Diagnosis  Patient has Malnutrition Diagnosis: Yes  Diagnosis Status: New  Malnutrition Diagnosis: Severe malnutrition related to acute disease or injury  Related to: repeat hospitalization within 10 days  As Evidenced by: significant weight loss of 9 lbs (8%) within 10 days and sudden poor intake of <50% for >5 days  Additional Assessment Information: PO intakes are slowly improving each day.            Nutrition Interventions/Recommendations   Nutrition prescription for oral nutrition    Nutrition Recommendations:  Individualized Nutrition Prescription Provided for : Continue REGULAR diet supplemented with ENSURE PLUS HP and MAGIC CUPS as tolerated.    Nutrition Interventions/Goals:   Interventions: Medical food supplement, Meals and snacks  Goal: ENSURE PLUS HP (350 kcal/20g protein), MAGIC CUP (290 kcal/9g pro)      Education Documentation  Nutrition Related Education, taught by Kiana Plaza RDN, LD at 3/13/2025 12:06 PM.  Learner: Patient  Readiness: Acceptance  Method: Explanation, Handout  Response: Verbalizes Understanding  Comment: Reviewed menu options to encourage intakes " and assisted with placing lunch and dinner order for today. Offered Magic Cups as an alternative to Ensure since pt does not really like the Ensure.              Nutrition Monitoring and Evaluation   Food/Nutrient Related History Monitoring  Monitoring and Evaluation Plan: Intake / amount of food  Intake / Amount of food: Consumes at least 75% or more of meals/snacks/supplements    Anthropometric Measurements  Monitoring and Evaluation Plan: Body weight  Body Weight: Body weight - Promote weight restoration         Physical Exam Findings  Monitoring and Evaluation Plan: Digestive System  Digestive System Finding: Nausea  Criteria: to resolve    Goal Status: New goal(s) identified    Time Spent (min): 30 minutes

## 2025-03-13 NOTE — NURSING NOTE
Pt tele and IV removed. DC instructions given to pt and grandson. Pt taken down in wheelchair. DC to home.

## 2025-03-13 NOTE — CARE PLAN
The patient's goals for the shift include  remain hemodynamically stable    The clinical goals for the shift include remain hemodynamically stable    Over the shift, the patient did make progress toward the following goals.

## 2025-03-13 NOTE — PROGRESS NOTES
"   03/13/25 1341   Discharge Planning   Home or Post Acute Services In home services   Type of Home Care Services Home OT;Home PT;Home nursing visits   Expected Discharge Disposition Home H   Does the patient need discharge transport arranged? No     Patient is discharging home with the\"[facility]\" program and [facility] referral.   "

## 2025-03-14 ENCOUNTER — HOME CARE VISIT (OUTPATIENT)
Dept: HOME HEALTH SERVICES | Facility: HOME HEALTH | Age: 85
End: 2025-03-14
Payer: MEDICARE

## 2025-03-14 VITALS
OXYGEN SATURATION: 98 % | SYSTOLIC BLOOD PRESSURE: 124 MMHG | HEART RATE: 76 BPM | RESPIRATION RATE: 14 BRPM | DIASTOLIC BLOOD PRESSURE: 70 MMHG | TEMPERATURE: 98.3 F

## 2025-03-14 PROCEDURE — G0151 HHCP-SERV OF PT,EA 15 MIN: HCPCS | Mod: HHH

## 2025-03-14 PROCEDURE — 169592 NO-PAY CLAIM PROCEDURE

## 2025-03-14 PROCEDURE — G0152 HHCP-SERV OF OT,EA 15 MIN: HCPCS | Mod: HHH | Performed by: OCCUPATIONAL THERAPIST

## 2025-03-14 ASSESSMENT — ACTIVITIES OF DAILY LIVING (ADL)
BATHING_CURRENT_FUNCTION: MODERATE ASSIST
BATHING ASSESSED: 1
DRESSING_LB_CURRENT_FUNCTION: INDEPENDENT
DRESSING_UB_CURRENT_FUNCTION: INDEPENDENT
ENTERING_EXITING_HOME: ONE PERSON
TOILETING: SUPERVISION
PHYSICAL_TRANSFERS_DEVICES: USES WALKER
PREPARING MEALS: NEEDS ASSISTANCE
PHYSICAL TRANSFERS ASSESSED: 1
LAUNDRY: NEEDS ASSISTANCE
GROOMING_CURRENT_FUNCTION: SUPERVISION
AMBULATION ASSISTANCE: SUPERVISION
CURRENT_FUNCTION: STAND BY ASSIST
GROOMING ASSESSED: 1
AMBULATION ASSISTANCE ON FLAT SURFACES: 1
OASIS_M1830: 03
AMBULATION ASSISTANCE: 1
LAUNDRY ASSESSED: 1
TOILETING: 1

## 2025-03-14 ASSESSMENT — ENCOUNTER SYMPTOMS
PAIN: 1
DENIES PAIN: 1
OCCASIONAL FEELINGS OF UNSTEADINESS: 1
PAIN LOCATION: ABDOMEN
PERSON REPORTING PAIN: PATIENT

## 2025-03-18 ENCOUNTER — HOME CARE VISIT (OUTPATIENT)
Dept: HOME HEALTH SERVICES | Facility: HOME HEALTH | Age: 85
End: 2025-03-18
Payer: MEDICARE

## 2025-03-18 VITALS — OXYGEN SATURATION: 99 % | HEART RATE: 72 BPM

## 2025-03-18 PROCEDURE — G0157 HHC PT ASSISTANT EA 15: HCPCS | Mod: CQ,HHH

## 2025-03-18 PROCEDURE — G0158 HHC OT ASSISTANT EA 15: HCPCS | Mod: CO,HHH

## 2025-03-18 ASSESSMENT — ENCOUNTER SYMPTOMS
PAIN LOCATION: GENERALIZED
SUBJECTIVE PAIN PROGRESSION: WAXING AND WANING
LOWEST PAIN SEVERITY IN PAST 24 HOURS: 5/10
PAIN: 1
HIGHEST PAIN SEVERITY IN PAST 24 HOURS: 5/10
PAIN: 1
HIGHEST PAIN SEVERITY IN PAST 24 HOURS: 5/10
PERSON REPORTING PAIN: PATIENT
SUBJECTIVE PAIN PROGRESSION: WAXING AND WANING
PAIN LOCATION: LEFT KNEE
LOWEST PAIN SEVERITY IN PAST 24 HOURS: 0/10
PERSON REPORTING PAIN: PATIENT
PAIN SEVERITY GOAL: 0/10

## 2025-03-19 ENCOUNTER — APPOINTMENT (OUTPATIENT)
Dept: CARDIOLOGY | Facility: CLINIC | Age: 85
End: 2025-03-19
Payer: MEDICARE

## 2025-03-20 ENCOUNTER — HOME CARE VISIT (OUTPATIENT)
Dept: HOME HEALTH SERVICES | Facility: HOME HEALTH | Age: 85
End: 2025-03-20
Payer: MEDICARE

## 2025-03-20 VITALS — HEART RATE: 65 BPM | OXYGEN SATURATION: 97 %

## 2025-03-20 PROCEDURE — G0158 HHC OT ASSISTANT EA 15: HCPCS | Mod: CO,HHH

## 2025-03-20 PROCEDURE — G0157 HHC PT ASSISTANT EA 15: HCPCS | Mod: CQ,HHH

## 2025-03-20 ASSESSMENT — ENCOUNTER SYMPTOMS
PERSON REPORTING PAIN: PATIENT
LOWEST PAIN SEVERITY IN PAST 24 HOURS: 0/10
DENIES PAIN: 1
PERSON REPORTING PAIN: PATIENT
HIGHEST PAIN SEVERITY IN PAST 24 HOURS: 3/10
PAIN: 1
PAIN SEVERITY GOAL: 0/10
PAIN LOCATION: LEFT KNEE

## 2025-03-21 NOTE — DOCUMENTATION CLARIFICATION NOTE
"    PATIENT:               ANNIA TUCKER  ACCT #:                  [id number]  MRN:                       [id number]  :                       1940  ADMIT DATE:       3/8/2025 11:47 PM  DISCH DATE:        3/13/2025 8:08 PM  RESPONDING PROVIDER #:        [id number]          PROVIDER RESPONSE TEXT:    I agree with dietician diagnosis of severe malnutrition on 3/13/25    CDI QUERY TEXT:    Clarification    Instruction:    Based on your assessment of the patient and the clinical information, please provide the requested documentation by clicking on the appropriate radio button and enter any additional information if prompted.    Question: Please further clarify this patient nutritional status as    When answering this query, please exercise your independent professional judgment. The fact that a question is being asked, does not imply that any particular answer is desired or expected.    The patient's clinical indicators include:  Clinical Information: Admit with dehydration and ULI    Clinical Indicators:    Per nutrition consult 3/13/25 \"Malnutrition Diagnosis: Severe malnutrition related to acute disease or injury  Related to: repeat hospitalization within 10 days  As Evidenced by: significant weight loss of 9 lbs (8%) within 10 days and sudden poor intake of <50% for >5 days  Additional Assessment Information: PO intakes are slowly improving each day.\" and  \"Height: 147.3 cm (4' 9.99\")  Weight: 46.5 kg (102 lb 8 oz)  BMI (Calculated): 21.43\"    Treatment: ensure added, dietician consult    Risk Factors: significant weight loss and poor intake  Options provided:  -- I agree with dietician diagnosis of severe malnutrition on 3/13/25  -- Other - I will add my own diagnosis  -- Refer to Clinical Documentation Reviewer    Query created by: Rhys Rand on 3/20/2025 8:40 AM      Electronically signed by:  RICARDO MARCUS MD 3/21/2025 2:04 PM          "

## 2025-03-24 ENCOUNTER — HOME CARE VISIT (OUTPATIENT)
Dept: HOME HEALTH SERVICES | Facility: HOME HEALTH | Age: 85
End: 2025-03-24
Payer: MEDICARE

## 2025-03-24 VITALS — HEART RATE: 63 BPM | OXYGEN SATURATION: 98 %

## 2025-03-24 PROCEDURE — G0158 HHC OT ASSISTANT EA 15: HCPCS | Mod: CO,HHH

## 2025-03-24 ASSESSMENT — ENCOUNTER SYMPTOMS: DENIES PAIN: 1

## 2025-03-25 ENCOUNTER — HOME CARE VISIT (OUTPATIENT)
Dept: HOME HEALTH SERVICES | Facility: HOME HEALTH | Age: 85
End: 2025-03-25
Payer: MEDICARE

## 2025-03-26 ENCOUNTER — HOME CARE VISIT (OUTPATIENT)
Dept: HOME HEALTH SERVICES | Facility: HOME HEALTH | Age: 85
End: 2025-03-26
Payer: MEDICARE

## 2025-03-26 VITALS — HEART RATE: 63 BPM | OXYGEN SATURATION: 97 %

## 2025-03-26 PROCEDURE — G0158 HHC OT ASSISTANT EA 15: HCPCS | Mod: CO,HHH

## 2025-03-26 ASSESSMENT — ENCOUNTER SYMPTOMS
PAIN LOCATION: LEFT KNEE
PAIN: 1
SUBJECTIVE PAIN PROGRESSION: WAXING AND WANING
PAIN LOCATION: LEFT HIP
PERSON REPORTING PAIN: PATIENT
HIGHEST PAIN SEVERITY IN PAST 24 HOURS: 6/10
LOWEST PAIN SEVERITY IN PAST 24 HOURS: 0/10

## 2025-03-27 ENCOUNTER — OFFICE VISIT (OUTPATIENT)
Dept: CARDIOLOGY | Facility: CLINIC | Age: 85
End: 2025-03-27
Payer: MEDICARE

## 2025-03-27 ENCOUNTER — HOME CARE VISIT (OUTPATIENT)
Dept: HOME HEALTH SERVICES | Facility: HOME HEALTH | Age: 85
End: 2025-03-27
Payer: MEDICARE

## 2025-03-27 VITALS
DIASTOLIC BLOOD PRESSURE: 62 MMHG | HEIGHT: 55 IN | OXYGEN SATURATION: 100 % | WEIGHT: 116 LBS | SYSTOLIC BLOOD PRESSURE: 132 MMHG | HEART RATE: 66 BPM | BODY MASS INDEX: 26.85 KG/M2

## 2025-03-27 DIAGNOSIS — I48.91 ATRIAL FIBRILLATION, UNSPECIFIED TYPE (MULTI): ICD-10-CM

## 2025-03-27 DIAGNOSIS — I48.0 PAROXYSMAL ATRIAL FIBRILLATION (MULTI): Primary | ICD-10-CM

## 2025-03-27 PROCEDURE — 99214 OFFICE O/P EST MOD 30 MIN: CPT | Performed by: INTERNAL MEDICINE

## 2025-03-27 PROCEDURE — 1036F TOBACCO NON-USER: CPT | Performed by: INTERNAL MEDICINE

## 2025-03-27 PROCEDURE — 93005 ELECTROCARDIOGRAM TRACING: CPT | Performed by: INTERNAL MEDICINE

## 2025-03-27 PROCEDURE — G0157 HHC PT ASSISTANT EA 15: HCPCS | Mod: CQ,HHH

## 2025-03-27 PROCEDURE — 1111F DSCHRG MED/CURRENT MED MERGE: CPT | Performed by: INTERNAL MEDICINE

## 2025-03-27 PROCEDURE — 1157F ADVNC CARE PLAN IN RCRD: CPT | Performed by: INTERNAL MEDICINE

## 2025-03-27 PROCEDURE — 1159F MED LIST DOCD IN RCRD: CPT | Performed by: INTERNAL MEDICINE

## 2025-03-27 PROCEDURE — 99214 OFFICE O/P EST MOD 30 MIN: CPT | Mod: 25 | Performed by: INTERNAL MEDICINE

## 2025-03-27 PROCEDURE — 93010 ELECTROCARDIOGRAM REPORT: CPT | Performed by: INTERNAL MEDICINE

## 2025-03-27 RX ORDER — METOPROLOL SUCCINATE 25 MG/1
25 TABLET, EXTENDED RELEASE ORAL NIGHTLY
Qty: 90 TABLET | Refills: 3 | Status: SHIPPED | OUTPATIENT
Start: 2025-03-27 | End: 2026-03-27

## 2025-03-27 ASSESSMENT — ENCOUNTER SYMPTOMS
PAIN: 1
PERSON REPORTING PAIN: PATIENT
HIGHEST PAIN SEVERITY IN PAST 24 HOURS: 5/10
SUBJECTIVE PAIN PROGRESSION: WAXING AND WANING
LOWEST PAIN SEVERITY IN PAST 24 HOURS: 0/10
PAIN LOCATION: LEFT KNEE
PAIN SEVERITY GOAL: 0/10

## 2025-03-27 NOTE — PROGRESS NOTES
Name : Norma Villasenor   : 1940   MRN : [id number]   ENC Date : 2025      Assessment and Plan:  Persistent atrial fibrillation: Unclear if patient is taking Multaq or not.  She was definitely getting it while in the hospital.  I think it has helped maintain sinus rhythm and I would like her to continue it.  We will double check when she gets home to see if she is actually taking it or not.  She did have some junctional bradycardia when she was having some nausea and vomiting and her heart rate is relatively low so I think cutting her metoprolol succinate back to 25 mg nightly make sense.  Continue Eliquis indefinitely at this point.  Hypertension: Blood pressure is well-controlled on current regimen.  Recommend no changes.  Coronary artery disease: Status post LAD stent .  No angina.  No need for stress testing.  Continue rosuvastatin indefinitely  Disp: RTO in 3 months    HPI:  Patient seen for the first time outside of the hospital.  She feels well today.  No chest pain.  She is tolerating her medications well.  There is some confusion as to whether or not she is taking dronedarone.  She was definitely getting it while in the hospital.  EKG today shows that she is in sinus rhythm with occasional PACs.  When she was having intractable nausea and vomiting she had a junctional bradycardic rhythm but never had any arrest or syncope.  She denies any TIA or CVA-like symptoms.  No bleeding complications on age and weight adjusted Eliquis.    Problem list overview:   Patient Active Problem List   Diagnosis    Influenza A    ULI (acute kidney injury) (CMS-[facility])    Chest pain, unspecified type    Paroxysmal atrial fibrillation (Multi)    Hypoxia    Intractable nausea and vomiting       Meds:   Current Outpatient Medications on File Prior to Visit   Medication Sig Dispense Refill    allopurinol (Zyloprim) 100 mg tablet Take 1 tablet (100 mg) by mouth once daily.      amLODIPine (Norvasc) 5 mg tablet TAKE 1  "TABLET ONE TIME DAILY 90 tablet 0    apixaban (Eliquis) 2.5 mg tablet Take 1 tablet (2.5 mg) by mouth every 12 hours. 60 tablet 0    calcitriol (Rocaltrol) 0.25 mcg capsule Take 1 capsule (0.25 mcg) by mouth once daily.      dicyclomine (Bentyl) 10 mg capsule Take 1 capsule (10 mg) by mouth 3 times a day as needed (cramps).      fluticasone furoate-vilanteroL (Breo Ellipta) 200-25 mcg/dose inhaler Inhale 1 puff once daily.      gabapentin (Neurontin) 100 mg capsule Take 1 capsule (100 mg) by mouth once daily at bedtime.      meclizine (Antivert) 25 mg tablet Take 1 tablet (25 mg) by mouth 3 times a day as needed for dizziness.      pantoprazole (ProtoNix) 40 mg EC tablet Take 1 tablet (40 mg) by mouth once daily.      potassium chloride CR 20 mEq ER tablet Take 1 tablet (20 mEq) by mouth once daily.      rosuvastatin (Crestor) 5 mg tablet Take 1 tablet (5 mg) by mouth once daily at bedtime.      tiZANidine (Zanaflex) 2 mg tablet Take 1 tablet (2 mg) by mouth once daily at bedtime.      [DISCONTINUED] metoprolol succinate XL (Toprol-XL) 50 mg 24 hr tablet Take 1 tablet (50 mg) by mouth once daily at bedtime. Do not crush or chew.      [DISCONTINUED] dronedarone (Multaq) 400 mg tablet Take 1 tablet (400 mg) by mouth 2 times a day. 60 tablet 0     No current facility-administered medications on file prior to visit.        VS:  /62 (BP Location: Right arm, Patient Position: Sitting)   Pulse 66   Ht 1.372 m (4' 6\")   Wt 52.6 kg (116 lb)   SpO2 100%   BMI 27.97 kg/m²     Vitals reviewed.   Neck:      Vascular: No JVD.   Pulmonary:      Effort: Pulmonary effort is normal.      Breath sounds: Normal breath sounds.   Cardiovascular:      Normal rate. Occasional ectopic beats. Regular rhythm.      Murmurs: There is no murmur.      No gallop.    Pulses:     Intact distal pulses.   Edema:     Peripheral edema absent.   Abdominal:      General: Abdomen is flat.      Palpations: Abdomen is soft.   Neurological:      " General: No focal deficit present.      Mental Status: Alert.   Psychiatric:         Mood and Affect: Mood normal.         ECG: Normal sinus rhythm with occasional PACs  ECHO: Results for orders placed during the hospital encounter of 02/07/25    Transthoracic Echo (TTE) Complete    Narrative  [facility]  [address] [place] [address]  Tel [phone] Fax [phone]    TRANSTHORACIC ECHOCARDIOGRAM REPORT    Patient Name:       ANNIA TUCKER         Reading Physician:    [id number] Lexa Villeda MD  Study Date:         2/9/2025              Ordering Provider:    [id number] RICARDO MARCUS  MRN/PID:            [id number]              Fellow:  Accession#:         [id number]          Nurse:  Date of Birth/Age:  1940 / 84 years Sonographer:          Figueroa Melo RDCS  Gender Assigned at  F                     Additional Staff:  Birth:  Height:             149.00 cm             Admit Date:  Weight:             53.00 kg              Admission Status:  BSA / BMI:          1.46 m2 / 23.87 kg/m2 Department Location:  Blood Pressure: 121 /60 mmHg    Study Type:    TRANSTHORACIC ECHO (TTE) COMPLETE  Diagnosis/ICD: Unspecified atrial fibrillation-I48.91  CPT Codes:     Echo Complete w Full [id number]  Study Detail: The following Echo studies were performed: 2D, Doppler, M-Mode and  color flow.      PHYSICIAN INTERPRETATION:  Left Ventricle: The left ventricular systolic function is normal, with a visually estimated ejection fraction of 60-65%. There is mild concentric left ventricular hypertrophy. There are no regional wall motion abnormalities. The left ventricular cavity size is normal. There is mild increased septal and mildly increased posterior left ventricular wall thickness. There is left ventricular concentric remodeling. Spectral Doppler shows a Grade I (impaired relaxation pattern) of left ventricular diastolic filling with normal left atrial filling pressure.  Left Atrium: The left atrial  size is moderate to severely dilated.  Right Ventricle: The right ventricle is normal in size. There is normal right ventricular global systolic function.  Right Atrium: The right atrial size is normal.  Aortic Valve: The aortic valve is trileaflet. There is minimal aortic valve cusp calcification. There is evidence of mildly elevated transaortic gradients consistent with sclerosis of the aortic valve.  There is no evidence of aortic valve regurgitation. The peak instantaneous gradient of the aortic valve is 8 mmHg.  Mitral Valve: The mitral valve is mildly thickened. There is mild to moderate mitral annular calcification. There is trace mitral valve regurgitation.  Tricuspid Valve: The tricuspid valve is structurally normal. There is trace tricuspid regurgitation. The Doppler estimated RVSP is within normal limits at 26.4 mmHg.  Pulmonic Valve: The pulmonic valve is structurally normal. There is no indication of pulmonic valve regurgitation.  Pericardium: No pericardial effusion noted.  Aorta: The aortic root is normal.  In comparison to the previous echocardiogram(s): There are no prior studies on this patient for comparison purposes.      CONCLUSIONS:  1. The left ventricular systolic function is normal, with a visually estimated ejection fraction of 60-65%.  2. Spectral Doppler shows a Grade I (impaired relaxation pattern) of left ventricular diastolic filling with normal left atrial filling pressure.  3. The left atrial size is moderate to severely dilated.  4. Right ventricular systolic pressure is within normal limits.  5. Aortic valve sclerosis.    QUANTITATIVE DATA SUMMARY:    2D MEASUREMENTS:             Normal Ranges:  LAs:             4.20 cm     (2.7-4.0cm)  IVSd:            1.00 cm     (0.6-1.1cm)  LVPWd:           1.10 cm     (0.6-1.1cm)  LVIDd:           3.80 cm     (3.9-5.9cm)  LV Mass Index:   86.1 g/m2  LVEDV Index:     33.39 ml/m2      LEFT ATRIUM:                Normal Ranges:  LA Volume Index:  27.0 ml/m2      M-MODE MEASUREMENTS:         Normal Ranges:  Ao Root:             2.40 cm (2.0-3.7cm)  AoV Exc:             1.60 cm (1.5-2.5cm)      AORTA MEASUREMENTS:         Normal Ranges:  AoV Exc:            1.60 cm (1.5-2.5cm)  Asc Ao, d:          2.80 cm (2.1-3.4cm)      LV SYSTOLIC FUNCTION:  Normal Ranges:  EF-A4C View:    56 % (>=55%)  EF-A2C View:    68 %  EF-Biplane:     61 %  EF-Visual:      63 %  LV EF Reported: 63 %      LV DIASTOLIC FUNCTION:           Normal Ranges:  MV Peak E:             0.74 m/s  (0.7-1.2 m/s)  MV Peak A:             1.03 m/s  (0.42-0.7 m/s)  E/A Ratio:             0.72      (1.0-2.2)  MV e'                  0.063 m/s (>8.0)  MV lateral e'          0.06 m/s  MV medial e'           0.07 m/s  E/e' Ratio:            11.76     (<8.0)      MITRAL VALVE:          Normal Ranges:  MV DT:        261 msec (150-240msec)      AORTIC VALVE:           Normal Ranges:  AoV Vmax:      1.39 m/s (<=1.7m/s)  AoV Peak P.7 mmHg (<20mmHg)  LVOT Max Norm:  0.99 m/s (<=1.1m/s)  LVOT Diameter: 1.90 cm  (1.8-2.4cm)  AoV Area,Vmax: 2.02 cm2 (2.5-4.5cm2)      RIGHT VENTRICLE:  RV Basal 3.65 cm  RV Mid   2.84 cm  RV Major 5.1 cm  TAPSE:   17.2 mm  RV s'    0.20 m/s      TRICUSPID VALVE/RVSP:          Normal Ranges:  Peak TR Velocity:     2.42 m/s  RV Syst Pressure:     26 mmHg  (< 30mmHg)      [id number] Lexa Villeda MD  Electronically signed on 2/10/2025 at 8:13:52 AM        ** Final **     CT Results:  No results found for this or any previous visit from the past 365 days.      Lexa Villeda MD

## 2025-04-02 ENCOUNTER — HOME CARE VISIT (OUTPATIENT)
Dept: HOME HEALTH SERVICES | Facility: HOME HEALTH | Age: 85
End: 2025-04-02
Payer: MEDICARE

## 2025-04-02 VITALS — OXYGEN SATURATION: 97 % | DIASTOLIC BLOOD PRESSURE: 68 MMHG | SYSTOLIC BLOOD PRESSURE: 129 MMHG | HEART RATE: 74 BPM

## 2025-04-02 VITALS — HEART RATE: 61 BPM | SYSTOLIC BLOOD PRESSURE: 124 MMHG | OXYGEN SATURATION: 99 % | DIASTOLIC BLOOD PRESSURE: 90 MMHG

## 2025-04-02 PROCEDURE — G0152 HHCP-SERV OF OT,EA 15 MIN: HCPCS | Mod: HHH | Performed by: OCCUPATIONAL THERAPIST

## 2025-04-02 PROCEDURE — G0151 HHCP-SERV OF PT,EA 15 MIN: HCPCS | Mod: HHH

## 2025-04-02 ASSESSMENT — ENCOUNTER SYMPTOMS
PERSON REPORTING PAIN: PATIENT
PAIN LOCATION: LEFT KNEE
DENIES PAIN: 1
PAIN: 1

## 2025-04-02 ASSESSMENT — ACTIVITIES OF DAILY LIVING (ADL)
OASIS_M1830: 01
HOME_HEALTH_OASIS: 00

## 2025-04-03 ENCOUNTER — APPOINTMENT (OUTPATIENT)
Dept: RADIOLOGY | Facility: HOSPITAL | Age: 85
End: 2025-04-03
Payer: MEDICARE

## 2025-04-03 ENCOUNTER — HOSPITAL ENCOUNTER (OUTPATIENT)
Dept: CARDIOLOGY | Facility: HOSPITAL | Age: 85
Discharge: HOME | End: 2025-04-03
Payer: MEDICARE

## 2025-04-03 ENCOUNTER — HOSPITAL ENCOUNTER (EMERGENCY)
Facility: HOSPITAL | Age: 85
Discharge: HOME | End: 2025-04-04
Attending: STUDENT IN AN ORGANIZED HEALTH CARE EDUCATION/TRAINING PROGRAM
Payer: MEDICARE

## 2025-04-03 ENCOUNTER — APPOINTMENT (OUTPATIENT)
Dept: CARDIOLOGY | Facility: HOSPITAL | Age: 85
End: 2025-04-03
Payer: MEDICARE

## 2025-04-03 DIAGNOSIS — R53.1 GENERALIZED WEAKNESS: Primary | ICD-10-CM

## 2025-04-03 DIAGNOSIS — R11.0 NAUSEA: ICD-10-CM

## 2025-04-03 LAB
APPEARANCE UR: CLEAR
BILIRUB UR STRIP.AUTO-MCNC: NEGATIVE MG/DL
COLOR UR: ABNORMAL
GLUCOSE UR STRIP.AUTO-MCNC: NORMAL MG/DL
KETONES UR STRIP.AUTO-MCNC: ABNORMAL MG/DL
LEUKOCYTE ESTERASE UR QL STRIP.AUTO: NEGATIVE
NITRITE UR QL STRIP.AUTO: NEGATIVE
PH UR STRIP.AUTO: 5.5 [PH]
PROT UR STRIP.AUTO-MCNC: NEGATIVE MG/DL
RBC # UR STRIP.AUTO: NEGATIVE MG/DL
SP GR UR STRIP.AUTO: 1.01
UROBILINOGEN UR STRIP.AUTO-MCNC: NORMAL MG/DL

## 2025-04-03 PROCEDURE — 93005 ELECTROCARDIOGRAM TRACING: CPT

## 2025-04-03 PROCEDURE — 99285 EMERGENCY DEPT VISIT HI MDM: CPT | Mod: 25 | Performed by: STUDENT IN AN ORGANIZED HEALTH CARE EDUCATION/TRAINING PROGRAM

## 2025-04-03 PROCEDURE — 84484 ASSAY OF TROPONIN QUANT: CPT | Performed by: STUDENT IN AN ORGANIZED HEALTH CARE EDUCATION/TRAINING PROGRAM

## 2025-04-03 PROCEDURE — 71046 X-RAY EXAM CHEST 2 VIEWS: CPT

## 2025-04-03 PROCEDURE — 83735 ASSAY OF MAGNESIUM: CPT | Performed by: STUDENT IN AN ORGANIZED HEALTH CARE EDUCATION/TRAINING PROGRAM

## 2025-04-03 PROCEDURE — 81003 URINALYSIS AUTO W/O SCOPE: CPT | Performed by: STUDENT IN AN ORGANIZED HEALTH CARE EDUCATION/TRAINING PROGRAM

## 2025-04-03 PROCEDURE — 36415 COLL VENOUS BLD VENIPUNCTURE: CPT | Performed by: STUDENT IN AN ORGANIZED HEALTH CARE EDUCATION/TRAINING PROGRAM

## 2025-04-03 PROCEDURE — 85025 COMPLETE CBC W/AUTO DIFF WBC: CPT | Performed by: STUDENT IN AN ORGANIZED HEALTH CARE EDUCATION/TRAINING PROGRAM

## 2025-04-03 PROCEDURE — 71046 X-RAY EXAM CHEST 2 VIEWS: CPT | Performed by: SURGERY

## 2025-04-03 PROCEDURE — 80053 COMPREHEN METABOLIC PANEL: CPT | Performed by: STUDENT IN AN ORGANIZED HEALTH CARE EDUCATION/TRAINING PROGRAM

## 2025-04-03 PROCEDURE — 99285 EMERGENCY DEPT VISIT HI MDM: CPT | Performed by: STUDENT IN AN ORGANIZED HEALTH CARE EDUCATION/TRAINING PROGRAM

## 2025-04-03 ASSESSMENT — PAIN - FUNCTIONAL ASSESSMENT: PAIN_FUNCTIONAL_ASSESSMENT: 0-10

## 2025-04-03 ASSESSMENT — PAIN SCALES - GENERAL: PAINLEVEL_OUTOF10: 0 - NO PAIN

## 2025-04-04 VITALS
OXYGEN SATURATION: 98 % | BODY MASS INDEX: 22.38 KG/M2 | TEMPERATURE: 97.2 F | RESPIRATION RATE: 16 BRPM | HEIGHT: 59 IN | DIASTOLIC BLOOD PRESSURE: 63 MMHG | HEART RATE: 58 BPM | WEIGHT: 111 LBS | SYSTOLIC BLOOD PRESSURE: 131 MMHG

## 2025-04-04 LAB
ALBUMIN SERPL BCP-MCNC: 4.2 G/DL (ref 3.4–5)
ALP SERPL-CCNC: 48 U/L (ref 33–136)
ALT SERPL W P-5'-P-CCNC: 8 U/L (ref 7–45)
ANION GAP SERPL CALC-SCNC: 17 MMOL/L (ref 10–20)
AST SERPL W P-5'-P-CCNC: 12 U/L (ref 9–39)
ATRIAL RATE: 89 BPM
BASOPHILS # BLD AUTO: 0.03 X10*3/UL (ref 0–0.1)
BASOPHILS NFR BLD AUTO: 0.4 %
BILIRUB SERPL-MCNC: 0.6 MG/DL (ref 0–1.2)
BUN SERPL-MCNC: 28 MG/DL (ref 6–23)
CALCIUM SERPL-MCNC: 9.7 MG/DL (ref 8.6–10.3)
CARDIAC TROPONIN I PNL SERPL HS: 8 NG/L (ref 0–13)
CARDIAC TROPONIN I PNL SERPL HS: 9 NG/L (ref 0–13)
CHLORIDE SERPL-SCNC: 104 MMOL/L (ref 98–107)
CO2 SERPL-SCNC: 21 MMOL/L (ref 21–32)
CREAT SERPL-MCNC: 1.49 MG/DL (ref 0.5–1.05)
EGFRCR SERPLBLD CKD-EPI 2021: 35 ML/MIN/1.73M*2
EOSINOPHIL # BLD AUTO: 0.23 X10*3/UL (ref 0–0.4)
EOSINOPHIL NFR BLD AUTO: 3.4 %
ERYTHROCYTE [DISTWIDTH] IN BLOOD BY AUTOMATED COUNT: 14.3 % (ref 11.5–14.5)
FLUAV RNA RESP QL NAA+PROBE: NOT DETECTED
FLUBV RNA RESP QL NAA+PROBE: NOT DETECTED
GLUCOSE SERPL-MCNC: 70 MG/DL (ref 74–99)
HCT VFR BLD AUTO: 34.6 % (ref 36–46)
HGB BLD-MCNC: 11 G/DL (ref 12–16)
HOLD SPECIMEN: NORMAL
HOLD SPECIMEN: NORMAL
IMM GRANULOCYTES # BLD AUTO: 0.04 X10*3/UL (ref 0–0.5)
IMM GRANULOCYTES NFR BLD AUTO: 0.6 % (ref 0–0.9)
LYMPHOCYTES # BLD AUTO: 1.62 X10*3/UL (ref 0.8–3)
LYMPHOCYTES NFR BLD AUTO: 23.7 %
MAGNESIUM SERPL-MCNC: 1.65 MG/DL (ref 1.6–2.4)
MCH RBC QN AUTO: 29.8 PG (ref 26–34)
MCHC RBC AUTO-ENTMCNC: 31.8 G/DL (ref 32–36)
MCV RBC AUTO: 94 FL (ref 80–100)
MONOCYTES # BLD AUTO: 0.58 X10*3/UL (ref 0.05–0.8)
MONOCYTES NFR BLD AUTO: 8.5 %
NEUTROPHILS # BLD AUTO: 4.33 X10*3/UL (ref 1.6–5.5)
NEUTROPHILS NFR BLD AUTO: 63.4 %
NRBC BLD-RTO: 0 /100 WBCS (ref 0–0)
P AXIS: 51 DEGREES
P OFFSET: 202 MS
P ONSET: 150 MS
PLATELET # BLD AUTO: 385 X10*3/UL (ref 150–450)
POTASSIUM SERPL-SCNC: 3.6 MMOL/L (ref 3.5–5.3)
PR INTERVAL: 140 MS
PROT SERPL-MCNC: 7.4 G/DL (ref 6.4–8.2)
Q ONSET: 220 MS
QRS COUNT: 15 BEATS
QRS DURATION: 88 MS
QT INTERVAL: 384 MS
QTC CALCULATION(BAZETT): 467 MS
QTC FREDERICIA: 437 MS
R AXIS: 73 DEGREES
RBC # BLD AUTO: 3.69 X10*6/UL (ref 4–5.2)
SARS-COV-2 RNA RESP QL NAA+PROBE: NOT DETECTED
SODIUM SERPL-SCNC: 138 MMOL/L (ref 136–145)
T AXIS: 53 DEGREES
T OFFSET: 412 MS
VENTRICULAR RATE: 89 BPM
WBC # BLD AUTO: 6.8 X10*3/UL (ref 4.4–11.3)

## 2025-04-04 PROCEDURE — 96361 HYDRATE IV INFUSION ADD-ON: CPT

## 2025-04-04 PROCEDURE — 96374 THER/PROPH/DIAG INJ IV PUSH: CPT

## 2025-04-04 PROCEDURE — 36415 COLL VENOUS BLD VENIPUNCTURE: CPT | Performed by: STUDENT IN AN ORGANIZED HEALTH CARE EDUCATION/TRAINING PROGRAM

## 2025-04-04 PROCEDURE — 2500000004 HC RX 250 GENERAL PHARMACY W/ HCPCS (ALT 636 FOR OP/ED): Performed by: STUDENT IN AN ORGANIZED HEALTH CARE EDUCATION/TRAINING PROGRAM

## 2025-04-04 PROCEDURE — 84484 ASSAY OF TROPONIN QUANT: CPT | Performed by: STUDENT IN AN ORGANIZED HEALTH CARE EDUCATION/TRAINING PROGRAM

## 2025-04-04 PROCEDURE — 87636 SARSCOV2 & INF A&B AMP PRB: CPT | Performed by: STUDENT IN AN ORGANIZED HEALTH CARE EDUCATION/TRAINING PROGRAM

## 2025-04-04 RX ORDER — ONDANSETRON HYDROCHLORIDE 2 MG/ML
4 INJECTION, SOLUTION INTRAVENOUS ONCE
Status: COMPLETED | OUTPATIENT
Start: 2025-04-04 | End: 2025-04-04

## 2025-04-04 RX ORDER — ONDANSETRON 4 MG/1
4 TABLET, FILM COATED ORAL EVERY 6 HOURS
Qty: 12 TABLET | Refills: 0 | Status: SHIPPED | OUTPATIENT
Start: 2025-04-04 | End: 2025-04-07

## 2025-04-04 RX ADMIN — ONDANSETRON 4 MG: 2 INJECTION INTRAMUSCULAR; INTRAVENOUS at 00:40

## 2025-04-04 RX ADMIN — SODIUM CHLORIDE, SODIUM LACTATE, POTASSIUM CHLORIDE, AND CALCIUM CHLORIDE 1000 ML: .6; .31; .03; .02 INJECTION, SOLUTION INTRAVENOUS at 00:40

## 2025-04-04 NOTE — DISCHARGE INSTRUCTIONS
Please, take medication as prescribed, follow-up with your PCP within 3 days and return to the ED if new/worsening symptoms or any other concern.

## 2025-04-04 NOTE — ED PROVIDER NOTES
EMERGENCY DEPARTMENT ENCOUNTER      Pt Name: Norma Villasenor  MRN: [id number]  Birthdate 1940  Date of evaluation: 4/3/2025  Provider: Marisa Cole MD    CHIEF COMPLAINT       Chief Complaint   Patient presents with    Weakness, Gen         HISTORY OF PRESENT ILLNESS    A 84-year-old female with past medical history of A-fib, essential hypertension, CAD, hyperlipidemia who comes for generalized weakness with decreased oral intake over the past week. She endorses intermittent cough, nausea with nonbloody vomiting and multiple episodes of diarrhea 2 days ago.  Patient took Imodium which resolved the diarrhea. The patient's and family members' biggest concern is that she is presenting with the same symptoms as 2 weeks ago, which led to an admission for dehydration and kidney injury. She is on her baseline mental status, per grandsons at bedside.  Denies fever/chills, nasal congestion, chest/abdominal pain, shortness of breath, recent falls, surgeries or traumas.      History provided by:  Patient and significant other   used: No        Nursing Notes were reviewed.    PAST MEDICAL HISTORY     Past Medical History:   Diagnosis Date    GERD (gastroesophageal reflux disease)     Hypertension          SURGICAL HISTORY       Past Surgical History:   Procedure Laterality Date    CHOLECYSTECTOMY      COLONOSCOPY      CORONARY STENT PLACEMENT      OTHER SURGICAL HISTORY      UPPER GASTROINTESTINAL ENDOSCOPY           CURRENT MEDICATIONS       Current Discharge Medication List        CONTINUE these medications which have NOT CHANGED    Details   allopurinol (Zyloprim) 100 mg tablet Take 1 tablet (100 mg) by mouth once daily.      amLODIPine (Norvasc) 5 mg tablet TAKE 1 TABLET ONE TIME DAILY  Qty: 90 tablet, Refills: 0    Associated Diagnoses: Primary hypertension      apixaban (Eliquis) 2.5 mg tablet Take 1 tablet (2.5 mg) by mouth every 12 hours.  Qty: 60 tablet, Refills: 0    Associated  Diagnoses: Atrial fibrillation, unspecified type (Multi)      calcitriol (Rocaltrol) 0.25 mcg capsule Take 1 capsule (0.25 mcg) by mouth once daily.      dicyclomine (Bentyl) 10 mg capsule Take 1 capsule (10 mg) by mouth 3 times a day as needed (cramps).      dronedarone (Multaq) 400 mg tablet Take 1 tablet (400 mg) by mouth 2 times a day.  Qty: 180 tablet, Refills: 3    Associated Diagnoses: Atrial fibrillation, unspecified type (Multi)      fluticasone furoate-vilanteroL (Breo Ellipta) 200-25 mcg/dose inhaler Inhale 1 puff once daily.      gabapentin (Neurontin) 100 mg capsule Take 1 capsule (100 mg) by mouth once daily at bedtime.      meclizine (Antivert) 25 mg tablet Take 1 tablet (25 mg) by mouth 3 times a day as needed for dizziness.      metoprolol succinate XL (Toprol-XL) 25 mg 24 hr tablet Take 1 tablet (25 mg) by mouth once daily at bedtime. Do not crush or chew.  Qty: 90 tablet, Refills: 3    Associated Diagnoses: Paroxysmal atrial fibrillation (Multi); Atrial fibrillation, unspecified type (Multi)      pantoprazole (ProtoNix) 40 mg EC tablet Take 1 tablet (40 mg) by mouth once daily.      potassium chloride CR 20 mEq ER tablet Take 1 tablet (20 mEq) by mouth once daily.      rosuvastatin (Crestor) 5 mg tablet Take 1 tablet (5 mg) by mouth once daily at bedtime.      tiZANidine (Zanaflex) 2 mg tablet Take 1 tablet (2 mg) by mouth once daily at bedtime.             ALLERGIES     Patient has no known allergies.    FAMILY HISTORY     No family history on file.       SOCIAL HISTORY       Social History     Socioeconomic History    Marital status:    Tobacco Use    Smoking status: Never     Passive exposure: Past    Smokeless tobacco: Never   Substance and Sexual Activity    Alcohol use: Not Currently    Drug use: Never     Social Drivers of Health     Financial Resource Strain: Low Risk  (3/10/2025)    Overall Financial Resource Strain (CARDIA)     Difficulty of Paying Living Expenses: Not hard at all    Food Insecurity: Patient Unable To Answer (3/9/2025)    Hunger Vital Sign     Worried About Running Out of Food in the Last Year: Patient unable to answer     Ran Out of Food in the Last Year: Patient unable to answer   Transportation Needs: No Transportation Needs (4/2/2025)    OASIS : Transportation     Lack of Transportation (Medical): No     Lack of Transportation (Non-Medical): No     Patient Unable or Declines to Respond: No   Stress: Patient Unable To Answer (3/9/2025)    [employer] - Occupational Stress Questionnaire     Feeling of Stress : Patient unable to answer   Social Connections: Feeling Socially Integrated (4/2/2025)    OASIS : Social Isolation     Frequency of experiencing loneliness or isolation: Never   Intimate Partner Violence: Patient Unable To Answer (3/9/2025)    Humiliation, Afraid, Rape, and Kick questionnaire     Fear of Current or Ex-Partner: Patient unable to answer     Emotionally Abused: Patient unable to answer     Physically Abused: Patient unable to answer     Sexually Abused: Patient unable to answer   Housing Stability: Low Risk  (3/10/2025)    Housing Stability Vital Sign     Unable to Pay for Housing in the Last Year: No     Number of Times Moved in the Last Year: 0     Homeless in the Last Year: No       SCREENINGS                        PHYSICAL EXAM    (up to 7 for level 4, 8 or more for level 5)     ED Triage Vitals [04/03/25 2122]   Temperature Heart Rate Respirations BP   36.8 °C (98.2 °F) 67 16 134/63      Pulse Ox Temp Source Heart Rate Source Patient Position   97 % Temporal Monitor Sitting      BP Location FiO2 (%)     Right arm --       Physical Exam  Vitals and nursing note reviewed.   Constitutional:       General: She is not in acute distress.     Appearance: Normal appearance. She is not ill-appearing.   HENT:      Nose: Nose normal.   Eyes:      Extraocular Movements: Extraocular movements intact.   Cardiovascular:       Rate and Rhythm: Normal rate and regular rhythm.      Pulses: Normal pulses.      Heart sounds: Normal heart sounds.   Pulmonary:      Effort: Pulmonary effort is normal. No respiratory distress.      Breath sounds: Normal breath sounds. No stridor. No wheezing, rhonchi or rales.   Chest:      Chest wall: No tenderness.   Abdominal:      General: Bowel sounds are normal. There is no distension.      Palpations: Abdomen is soft. There is no mass.      Tenderness: There is no guarding or rebound.   Musculoskeletal:         General: No swelling or tenderness. Normal range of motion.      Right lower leg: No edema.      Left lower leg: No edema.   Skin:     Capillary Refill: Capillary refill takes less than 2 seconds.   Neurological:      General: No focal deficit present.      Mental Status: She is alert and oriented to person, place, and time.      Sensory: No sensory deficit.      Motor: No weakness.          DIAGNOSTIC RESULTS     LABS:  Labs Reviewed   CBC WITH AUTO DIFFERENTIAL - Abnormal       Result Value    WBC 6.8      nRBC 0.0      RBC 3.69 (*)     Hemoglobin 11.0 (*)     Hematocrit 34.6 (*)     MCV 94      MCH 29.8      MCHC 31.8 (*)     RDW 14.3      Platelets 385      Neutrophils % 63.4      Immature Granulocytes %, Automated 0.6      Lymphocytes % 23.7      Monocytes % 8.5      Eosinophils % 3.4      Basophils % 0.4      Neutrophils Absolute 4.33      Immature Granulocytes Absolute, Automated 0.04      Lymphocytes Absolute 1.62      Monocytes Absolute 0.58      Eosinophils Absolute 0.23      Basophils Absolute 0.03     COMPREHENSIVE METABOLIC PANEL - Abnormal    Glucose 70 (*)     Sodium 138      Potassium 3.6      Chloride 104      Bicarbonate 21      Anion Gap 17      Urea Nitrogen 28 (*)     Creatinine 1.49 (*)     eGFR 35 (*)     Calcium 9.7      Albumin 4.2      Alkaline Phosphatase 48      Total Protein 7.4      AST 12      Bilirubin, Total 0.6      ALT 8     URINALYSIS WITH REFLEX CULTURE AND  MICROSCOPIC - Abnormal    Color, Urine Light-Yellow      Appearance, Urine Clear      Specific Gravity, Urine 1.014      pH, Urine 5.5      Protein, Urine NEGATIVE      Glucose, Urine Normal      Blood, Urine NEGATIVE      Ketones, Urine 20 (1+) (*)     Bilirubin, Urine NEGATIVE      Urobilinogen, Urine Normal      Nitrite, Urine NEGATIVE      Leukocyte Esterase, Urine NEGATIVE     SERIAL TROPONIN-INITIAL - Normal    Troponin I, High Sensitivity 9      Narrative:     Less than 99th percentile of normal range cutoff-  Female and children under 18 years old <14 ng/L; Male <21 ng/L: Negative  Repeat testing should be performed if clinically indicated.     Female and children under 18 years old 14-50 ng/L; Male 21-50 ng/L:  Consistent with possible cardiac damage and possible increased clinical   risk. Serial measurements may help to assess extent of myocardial damage.     >50 ng/L: Consistent with cardiac damage, increased clinical risk and  myocardial infarction. Serial measurements may help assess extent of   myocardial damage.      NOTE: Children less than 1 year old may have higher baseline troponin   levels and results should be interpreted in conjunction with the overall   clinical context.     NOTE: Troponin I testing is performed using a different   testing methodology at [facility] than at other   [facility]. Direct result comparisons should only   be made within the same method.   MAGNESIUM - Normal    Magnesium 1.65     SARS-COV-2 AND INFLUENZA A/B PCR - Normal    Flu A Result Not Detected      Flu B Result Not Detected      Coronavirus 2019, PCR Not Detected      Narrative:     This assay is an FDA-cleared, in vitro diagnostic nucleic acid amplification test for the qualitative detection and differentiation of SARS CoV-2/ Influenza A/B from nasopharyngeal specimens collected from individuals with signs and symptoms of respiratory tract infections, and has been validated for use at  [facility]. Negative results do not preclude COVID-19/ Influenza A/B infections and should not be used as the sole basis for diagnosis, treatment, or other management decisions. Testing for SARS CoV-2 is recommended only for patients who meet current clinical and/or epidemiological criteria defined by federal, state, or local public health directives.   SERIAL TROPONIN, 1 HOUR - Normal    Troponin I, High Sensitivity 8      Narrative:     Less than 99th percentile of normal range cutoff-  Female and children under 18 years old <14 ng/L; Male <21 ng/L: Negative  Repeat testing should be performed if clinically indicated.     Female and children under 18 years old 14-50 ng/L; Male 21-50 ng/L:  Consistent with possible cardiac damage and possible increased clinical   risk. Serial measurements may help to assess extent of myocardial damage.     >50 ng/L: Consistent with cardiac damage, increased clinical risk and  myocardial infarction. Serial measurements may help assess extent of   myocardial damage.      NOTE: Children less than 1 year old may have higher baseline troponin   levels and results should be interpreted in conjunction with the overall   clinical context.     NOTE: Troponin I testing is performed using a different   testing methodology at [facility] than at [facility]. Direct result comparisons should only   be made within the same method.   TROPONIN SERIES- (INITIAL, 1 HR)    Narrative:     The following orders were created for panel order Troponin Series, (0, 1 HR).  Procedure                               Abnormality         Status                     ---------                               -----------         ------                     Troponin I, High Sensiti...[[id number]]  Normal              Final result               Troponin, High Sensitivi...[[id number]]  Normal              Final result                 Please view results for these tests on the  individual orders.   URINALYSIS WITH REFLEX CULTURE AND MICROSCOPIC    Narrative:     The following orders were created for panel order Urinalysis with Reflex Culture and Microscopic.  Procedure                               Abnormality         Status                     ---------                               -----------         ------                     Urinalysis with Reflex C...[[id number]]  Abnormal            Final result               Extra Urine Gray Tube[[id number]]                            In process                   Please view results for these tests on the individual orders.   EXTRA URINE GRAY TUBE       All other labs were within normal range or not returned as of this dictation.    Imaging  XR chest 2 views   Final Result   1.  No evidence of acute cardiopulmonary process. Emphysema/COPD.                  MACRO:   None        Signed by: Wu Ayala 4/3/2025 11:56 PM   Dictation workstation:   [id number]           Procedures  Procedures     EMERGENCY DEPARTMENT COURSE/MDM:     Diagnoses as of 04/04/25 0203   Generalized weakness   Nausea        Medical Decision Making  A 84-year-old female with past medical history as above comes for general weakness with nausea and decreased p.o. intake.  Zofran was administered administer for nausea.  EKG indicates undetermined rhythm with PACs and PVCs. No TARA or acute ischemic changes.  Trops and magnesium are within normal parameters.  Influenza and COVID-19 swabs are negative. CXR: no acute findings.  No evidence of acute infectious process.  CMP indicates hypoglycemia and kidney function on her baseline.  UA indicates ketonuria but no UTI. Ketonuria is most likely due to mild dehydration.  Patient was administered 1L IV fluid, and ginger ale was provided.  On reassessment, patient is hemodynamically stable and reports a significant improvement of symptoms.Patient was able to tolerate ginger ale and crackers without nausea or vomiting.  Results/findings and  discharge plan were discussed with patient and grandson at bedside.  Patient is discharged home with Zofran prescription, return precautions and PCP follow-up.  Patient and or family in agreement and understanding of treatment plan.  All questions answered.      I reviewed the case with the attending ED physician. The attending ED physician agrees with the plan. Patient and/or patient´s representative was counseled regarding labs, imaging, likely diagnosis, and plan. All questions were answered.    ED Medications administered this visit:    Medications   ondansetron (Zofran) injection 4 mg (4 mg intravenous Given 4/4/25 0040)   lactated Ringer's bolus 1,000 mL (0 mL intravenous Stopped 4/4/25 0148)       New Prescriptions from this visit:    Current Discharge Medication List        START taking these medications    Details   ondansetron (Zofran) 4 mg tablet Take 1 tablet (4 mg) by mouth every 6 hours for 3 days.  Qty: 12 tablet, Refills: 0    Associated Diagnoses: Nausea             Follow-up:  Bozena Caldwell MD  [address] [id number]  [phone]    Go in 3 days          Final Impression:   1. Generalized weakness    2. Nausea          (Please note that portions of this note were completed with a voice recognition program.  Efforts were made to edit the dictations but occasionally words are mis-transcribed.)     Marisa Cole MD  Resident  04/04/25 0202       Marisa Cole MD  Resident  04/04/25 0203

## 2025-04-09 LAB
ATRIAL RATE: 108 BPM
ATRIAL RATE: 89 BPM
P AXIS: 51 DEGREES
P AXIS: 69 DEGREES
P OFFSET: 202 MS
P OFFSET: 205 MS
P ONSET: 146 MS
P ONSET: 150 MS
PR INTERVAL: 140 MS
PR INTERVAL: 148 MS
Q ONSET: 220 MS
Q ONSET: 220 MS
QRS COUNT: 14 BEATS
QRS COUNT: 15 BEATS
QRS DURATION: 84 MS
QRS DURATION: 88 MS
QT INTERVAL: 384 MS
QT INTERVAL: 392 MS
QTC CALCULATION(BAZETT): 467 MS
QTC CALCULATION(BAZETT): 469 MS
QTC FREDERICIA: 437 MS
QTC FREDERICIA: 442 MS
R AXIS: 66 DEGREES
R AXIS: 73 DEGREES
T AXIS: 53 DEGREES
T AXIS: 66 DEGREES
T OFFSET: 412 MS
T OFFSET: 416 MS
VENTRICULAR RATE: 86 BPM
VENTRICULAR RATE: 89 BPM

## 2025-04-28 DIAGNOSIS — I48.91 ATRIAL FIBRILLATION, UNSPECIFIED TYPE (MULTI): ICD-10-CM

## 2025-05-01 ENCOUNTER — APPOINTMENT (OUTPATIENT)
Dept: RADIOLOGY | Facility: HOSPITAL | Age: 85
End: 2025-05-01
Payer: MEDICARE

## 2025-05-01 ENCOUNTER — HOSPITAL ENCOUNTER (INPATIENT)
Facility: HOSPITAL | Age: 85
LOS: 1 days | Discharge: HOME | End: 2025-05-02
Attending: EMERGENCY MEDICINE | Admitting: STUDENT IN AN ORGANIZED HEALTH CARE EDUCATION/TRAINING PROGRAM
Payer: MEDICARE

## 2025-05-01 ENCOUNTER — APPOINTMENT (OUTPATIENT)
Dept: CARDIOLOGY | Facility: HOSPITAL | Age: 85
End: 2025-05-01
Payer: MEDICARE

## 2025-05-01 ENCOUNTER — HOSPITAL ENCOUNTER (OUTPATIENT)
Facility: HOSPITAL | Age: 85
Setting detail: OUTPATIENT SURGERY
End: 2025-05-01
Attending: INTERNAL MEDICINE | Admitting: INTERNAL MEDICINE
Payer: MEDICARE

## 2025-05-01 ENCOUNTER — TELEPHONE (OUTPATIENT)
Dept: CARDIOLOGY | Facility: CLINIC | Age: 85
End: 2025-05-01
Payer: MEDICARE

## 2025-05-01 DIAGNOSIS — R07.9 CHEST PAIN, UNSPECIFIED TYPE: ICD-10-CM

## 2025-05-01 DIAGNOSIS — R00.1 SYMPTOMATIC BRADYCARDIA: Primary | ICD-10-CM

## 2025-05-01 DIAGNOSIS — I48.0 PAROXYSMAL ATRIAL FIBRILLATION (MULTI): ICD-10-CM

## 2025-05-01 DIAGNOSIS — R00.1 SYMPTOMATIC BRADYCARDIA: ICD-10-CM

## 2025-05-01 DIAGNOSIS — R55 NEAR SYNCOPE: ICD-10-CM

## 2025-05-01 DIAGNOSIS — N17.9 AKI (ACUTE KIDNEY INJURY): ICD-10-CM

## 2025-05-01 DIAGNOSIS — R00.1 JUNCTIONAL BRADYCARDIA: ICD-10-CM

## 2025-05-01 LAB
ALBUMIN SERPL BCP-MCNC: 4 G/DL (ref 3.4–5)
ALP SERPL-CCNC: 49 U/L (ref 33–136)
ALT SERPL W P-5'-P-CCNC: 31 U/L (ref 7–45)
ANION GAP SERPL CALC-SCNC: 13 MMOL/L (ref 10–20)
AST SERPL W P-5'-P-CCNC: 50 U/L (ref 9–39)
BASOPHILS # BLD AUTO: 0.04 X10*3/UL (ref 0–0.1)
BASOPHILS NFR BLD AUTO: 0.5 %
BILIRUB SERPL-MCNC: 0.3 MG/DL (ref 0–1.2)
BUN SERPL-MCNC: 48 MG/DL (ref 6–23)
CALCIUM SERPL-MCNC: 9.7 MG/DL (ref 8.6–10.3)
CARDIAC TROPONIN I PNL SERPL HS: 9 NG/L (ref 0–13)
CARDIAC TROPONIN I PNL SERPL HS: 9 NG/L (ref 0–13)
CHLORIDE SERPL-SCNC: 112 MMOL/L (ref 98–107)
CO2 SERPL-SCNC: 17 MMOL/L (ref 21–32)
CREAT SERPL-MCNC: 2.25 MG/DL (ref 0.5–1.05)
EGFRCR SERPLBLD CKD-EPI 2021: 21 ML/MIN/1.73M*2
EOSINOPHIL # BLD AUTO: 0.29 X10*3/UL (ref 0–0.4)
EOSINOPHIL NFR BLD AUTO: 4 %
ERYTHROCYTE [DISTWIDTH] IN BLOOD BY AUTOMATED COUNT: 14.1 % (ref 11.5–14.5)
GLUCOSE SERPL-MCNC: 114 MG/DL (ref 74–99)
HCT VFR BLD AUTO: 31.2 % (ref 36–46)
HGB BLD-MCNC: 10.1 G/DL (ref 12–16)
IMM GRANULOCYTES # BLD AUTO: 0.02 X10*3/UL (ref 0–0.5)
IMM GRANULOCYTES NFR BLD AUTO: 0.3 % (ref 0–0.9)
LYMPHOCYTES # BLD AUTO: 1.52 X10*3/UL (ref 0.8–3)
LYMPHOCYTES NFR BLD AUTO: 20.7 %
MAGNESIUM SERPL-MCNC: 2.15 MG/DL (ref 1.6–2.4)
MCH RBC QN AUTO: 30.8 PG (ref 26–34)
MCHC RBC AUTO-ENTMCNC: 32.4 G/DL (ref 32–36)
MCV RBC AUTO: 95 FL (ref 80–100)
MONOCYTES # BLD AUTO: 0.46 X10*3/UL (ref 0.05–0.8)
MONOCYTES NFR BLD AUTO: 6.3 %
NEUTROPHILS # BLD AUTO: 5.01 X10*3/UL (ref 1.6–5.5)
NEUTROPHILS NFR BLD AUTO: 68.2 %
NRBC BLD-RTO: 0 /100 WBCS (ref 0–0)
PLATELET # BLD AUTO: 330 X10*3/UL (ref 150–450)
POTASSIUM SERPL-SCNC: 4.9 MMOL/L (ref 3.5–5.3)
PROT SERPL-MCNC: 7.3 G/DL (ref 6.4–8.2)
RBC # BLD AUTO: 3.28 X10*6/UL (ref 4–5.2)
SODIUM SERPL-SCNC: 137 MMOL/L (ref 136–145)
WBC # BLD AUTO: 7.3 X10*3/UL (ref 4.4–11.3)

## 2025-05-01 PROCEDURE — C1889 IMPLANT/INSERT DEVICE, NOC: HCPCS | Performed by: INTERNAL MEDICINE

## 2025-05-01 PROCEDURE — 36415 COLL VENOUS BLD VENIPUNCTURE: CPT | Performed by: EMERGENCY MEDICINE

## 2025-05-01 PROCEDURE — 2500000004 HC RX 250 GENERAL PHARMACY W/ HCPCS (ALT 636 FOR OP/ED): Mod: JZ | Performed by: INTERNAL MEDICINE

## 2025-05-01 PROCEDURE — 99285 EMERGENCY DEPT VISIT HI MDM: CPT | Mod: 25 | Performed by: EMERGENCY MEDICINE

## 2025-05-01 PROCEDURE — 93005 ELECTROCARDIOGRAM TRACING: CPT

## 2025-05-01 PROCEDURE — C1898 LEAD, PMKR, OTHER THAN TRANS: HCPCS | Performed by: INTERNAL MEDICINE

## 2025-05-01 PROCEDURE — 99285 EMERGENCY DEPT VISIT HI MDM: CPT | Performed by: EMERGENCY MEDICINE

## 2025-05-01 PROCEDURE — 99223 1ST HOSP IP/OBS HIGH 75: CPT

## 2025-05-01 PROCEDURE — 3E0102A INTRODUCTION OF ANTI-INFECTIVE ENVELOPE INTO SUBCUTANEOUS TISSUE, OPEN APPROACH: ICD-10-PCS | Performed by: INTERNAL MEDICINE

## 2025-05-01 PROCEDURE — C1892 INTRO/SHEATH,FIXED,PEEL-AWAY: HCPCS | Performed by: INTERNAL MEDICINE

## 2025-05-01 PROCEDURE — 80053 COMPREHEN METABOLIC PANEL: CPT | Performed by: EMERGENCY MEDICINE

## 2025-05-01 PROCEDURE — 99152 MOD SED SAME PHYS/QHP 5/>YRS: CPT | Performed by: INTERNAL MEDICINE

## 2025-05-01 PROCEDURE — 84484 ASSAY OF TROPONIN QUANT: CPT | Performed by: EMERGENCY MEDICINE

## 2025-05-01 PROCEDURE — 99153 MOD SED SAME PHYS/QHP EA: CPT | Performed by: INTERNAL MEDICINE

## 2025-05-01 PROCEDURE — 33208 INSRT HEART PM ATRIAL & VENT: CPT | Performed by: INTERNAL MEDICINE

## 2025-05-01 PROCEDURE — 71045 X-RAY EXAM CHEST 1 VIEW: CPT | Performed by: RADIOLOGY

## 2025-05-01 PROCEDURE — 71045 X-RAY EXAM CHEST 1 VIEW: CPT

## 2025-05-01 PROCEDURE — 0JH606Z INSERTION OF PACEMAKER, DUAL CHAMBER INTO CHEST SUBCUTANEOUS TISSUE AND FASCIA, OPEN APPROACH: ICD-10-PCS | Performed by: INTERNAL MEDICINE

## 2025-05-01 PROCEDURE — 2750000001 HC OR 275 NO HCPCS: Performed by: INTERNAL MEDICINE

## 2025-05-01 PROCEDURE — 2720000007 HC OR 272 NO HCPCS: Performed by: INTERNAL MEDICINE

## 2025-05-01 PROCEDURE — 2500000001 HC RX 250 WO HCPCS SELF ADMINISTERED DRUGS (ALT 637 FOR MEDICARE OP)

## 2025-05-01 PROCEDURE — 02H60JZ INSERTION OF PACEMAKER LEAD INTO RIGHT ATRIUM, OPEN APPROACH: ICD-10-PCS | Performed by: INTERNAL MEDICINE

## 2025-05-01 PROCEDURE — 71045 X-RAY EXAM CHEST 1 VIEW: CPT | Performed by: INTERNAL MEDICINE

## 2025-05-01 PROCEDURE — 2060000001 HC INTERMEDIATE ICU ROOM DAILY

## 2025-05-01 PROCEDURE — 85025 COMPLETE CBC W/AUTO DIFF WBC: CPT | Performed by: EMERGENCY MEDICINE

## 2025-05-01 PROCEDURE — C1785 PMKR, DUAL, RATE-RESP: HCPCS | Performed by: INTERNAL MEDICINE

## 2025-05-01 PROCEDURE — 93010 ELECTROCARDIOGRAM REPORT: CPT | Performed by: INTERNAL MEDICINE

## 2025-05-01 PROCEDURE — 96367 TX/PROPH/DG ADDL SEQ IV INF: CPT | Performed by: INTERNAL MEDICINE

## 2025-05-01 PROCEDURE — 83735 ASSAY OF MAGNESIUM: CPT | Performed by: EMERGENCY MEDICINE

## 2025-05-01 PROCEDURE — 02HK0JZ INSERTION OF PACEMAKER LEAD INTO RIGHT VENTRICLE, OPEN APPROACH: ICD-10-PCS | Performed by: INTERNAL MEDICINE

## 2025-05-01 DEVICE — PACING LEAD
Type: IMPLANTABLE DEVICE | Site: CHEST | Status: FUNCTIONAL
Brand: ULTIPACE™

## 2025-05-01 DEVICE — ENVELOPE CMRM6122 ABSORB MED US
Type: IMPLANTABLE DEVICE | Site: CHEST | Status: FUNCTIONAL
Brand: TYRX™ ABSORBABLE ANTIBACTERIAL ENVELOPE - MEDIUM

## 2025-05-01 DEVICE — DR PACEMAKER DDDR
Type: IMPLANTABLE DEVICE | Site: CHEST | Status: FUNCTIONAL
Brand: ASSURITY MRI™

## 2025-05-01 RX ORDER — ACETAMINOPHEN 325 MG/1
650 TABLET ORAL EVERY 6 HOURS PRN
Status: DISCONTINUED | OUTPATIENT
Start: 2025-05-01 | End: 2025-05-02 | Stop reason: HOSPADM

## 2025-05-01 RX ORDER — VANCOMYCIN HYDROCHLORIDE 1 G/200ML
INJECTION, SOLUTION INTRAVENOUS CONTINUOUS PRN
Status: DISCONTINUED | OUTPATIENT
Start: 2025-05-01 | End: 2025-05-01 | Stop reason: HOSPADM

## 2025-05-01 RX ORDER — ASPIRIN 81 MG/1
81 TABLET ORAL DAILY
COMMUNITY

## 2025-05-01 RX ORDER — AMLODIPINE BESYLATE 5 MG/1
5 TABLET ORAL DAILY
Status: DISCONTINUED | OUTPATIENT
Start: 2025-05-02 | End: 2025-05-02 | Stop reason: HOSPADM

## 2025-05-01 RX ORDER — METOPROLOL TARTRATE 25 MG/1
25 TABLET, FILM COATED ORAL 2 TIMES DAILY
COMMUNITY
End: 2025-05-02 | Stop reason: HOSPADM

## 2025-05-01 RX ORDER — MIDAZOLAM HYDROCHLORIDE 1 MG/ML
INJECTION, SOLUTION INTRAMUSCULAR; INTRAVENOUS AS NEEDED
Status: DISCONTINUED | OUTPATIENT
Start: 2025-05-01 | End: 2025-05-01 | Stop reason: HOSPADM

## 2025-05-01 RX ORDER — FENTANYL CITRATE 50 UG/ML
INJECTION, SOLUTION INTRAMUSCULAR; INTRAVENOUS AS NEEDED
Status: DISCONTINUED | OUTPATIENT
Start: 2025-05-01 | End: 2025-05-01 | Stop reason: HOSPADM

## 2025-05-01 RX ORDER — ASPIRIN 81 MG/1
81 TABLET ORAL DAILY
Status: DISCONTINUED | OUTPATIENT
Start: 2025-05-02 | End: 2025-05-02 | Stop reason: HOSPADM

## 2025-05-01 RX ORDER — OXYCODONE HYDROCHLORIDE 5 MG/1
5 TABLET ORAL EVERY 6 HOURS PRN
Status: DISCONTINUED | OUTPATIENT
Start: 2025-05-01 | End: 2025-05-02 | Stop reason: HOSPADM

## 2025-05-01 RX ORDER — POLYETHYLENE GLYCOL 3350 17 G/17G
17 POWDER, FOR SOLUTION ORAL DAILY
Status: DISCONTINUED | OUTPATIENT
Start: 2025-05-01 | End: 2025-05-02 | Stop reason: HOSPADM

## 2025-05-01 RX ORDER — CEFADROXIL 500 MG/1
500 CAPSULE ORAL EVERY 24 HOURS
Status: DISCONTINUED | OUTPATIENT
Start: 2025-05-01 | End: 2025-05-02

## 2025-05-01 RX ORDER — LIDOCAINE HYDROCHLORIDE 20 MG/ML
INJECTION, SOLUTION INFILTRATION; PERINEURAL AS NEEDED
Status: DISCONTINUED | OUTPATIENT
Start: 2025-05-01 | End: 2025-05-01 | Stop reason: HOSPADM

## 2025-05-01 RX ADMIN — CEFADROXIL 500 MG: 500 CAPSULE ORAL at 15:42

## 2025-05-01 SDOH — ECONOMIC STABILITY: INCOME INSECURITY: IN THE PAST 12 MONTHS HAS THE ELECTRIC, GAS, OIL, OR WATER COMPANY THREATENED TO SHUT OFF SERVICES IN YOUR HOME?: NO

## 2025-05-01 SDOH — ECONOMIC STABILITY: FOOD INSECURITY: HOW HARD IS IT FOR YOU TO PAY FOR THE VERY BASICS LIKE FOOD, HOUSING, MEDICAL CARE, AND HEATING?: NOT HARD AT ALL

## 2025-05-01 SDOH — SOCIAL STABILITY: SOCIAL INSECURITY: WERE YOU ABLE TO COMPLETE ALL THE BEHAVIORAL HEALTH SCREENINGS?: YES

## 2025-05-01 SDOH — SOCIAL STABILITY: SOCIAL INSECURITY: ARE THERE ANY APPARENT SIGNS OF INJURIES/BEHAVIORS THAT COULD BE RELATED TO ABUSE/NEGLECT?: NO

## 2025-05-01 SDOH — SOCIAL STABILITY: SOCIAL INSECURITY: WITHIN THE LAST YEAR, HAVE YOU BEEN AFRAID OF YOUR PARTNER OR EX-PARTNER?: NO

## 2025-05-01 SDOH — SOCIAL STABILITY: SOCIAL INSECURITY: WITHIN THE LAST YEAR, HAVE YOU BEEN HUMILIATED OR EMOTIONALLY ABUSED IN OTHER WAYS BY YOUR PARTNER OR EX-PARTNER?: NO

## 2025-05-01 SDOH — ECONOMIC STABILITY: HOUSING INSECURITY: IN THE LAST 12 MONTHS, WAS THERE A TIME WHEN YOU WERE NOT ABLE TO PAY THE MORTGAGE OR RENT ON TIME?: NO

## 2025-05-01 SDOH — ECONOMIC STABILITY: HOUSING INSECURITY: AT ANY TIME IN THE PAST 12 MONTHS, WERE YOU HOMELESS OR LIVING IN A SHELTER (INCLUDING NOW)?: NO

## 2025-05-01 SDOH — ECONOMIC STABILITY: FOOD INSECURITY: WITHIN THE PAST 12 MONTHS, THE FOOD YOU BOUGHT JUST DIDN'T LAST AND YOU DIDN'T HAVE MONEY TO GET MORE.: NEVER TRUE

## 2025-05-01 SDOH — SOCIAL STABILITY: SOCIAL INSECURITY: HAVE YOU HAD THOUGHTS OF HARMING ANYONE ELSE?: NO

## 2025-05-01 SDOH — ECONOMIC STABILITY: FOOD INSECURITY: WITHIN THE PAST 12 MONTHS, YOU WORRIED THAT YOUR FOOD WOULD RUN OUT BEFORE YOU GOT THE MONEY TO BUY MORE.: NEVER TRUE

## 2025-05-01 SDOH — ECONOMIC STABILITY: HOUSING INSECURITY: IN THE PAST 12 MONTHS, HOW MANY TIMES HAVE YOU MOVED WHERE YOU WERE LIVING?: 0

## 2025-05-01 SDOH — SOCIAL STABILITY: SOCIAL INSECURITY: DO YOU FEEL ANYONE HAS EXPLOITED OR TAKEN ADVANTAGE OF YOU FINANCIALLY OR OF YOUR PERSONAL PROPERTY?: NO

## 2025-05-01 SDOH — SOCIAL STABILITY: SOCIAL INSECURITY: ARE YOU OR HAVE YOU BEEN THREATENED OR ABUSED PHYSICALLY, EMOTIONALLY, OR SEXUALLY BY ANYONE?: NO

## 2025-05-01 SDOH — SOCIAL STABILITY: SOCIAL INSECURITY: DO YOU FEEL UNSAFE GOING BACK TO THE PLACE WHERE YOU ARE LIVING?: NO

## 2025-05-01 SDOH — ECONOMIC STABILITY: TRANSPORTATION INSECURITY: IN THE PAST 12 MONTHS, HAS LACK OF TRANSPORTATION KEPT YOU FROM MEDICAL APPOINTMENTS OR FROM GETTING MEDICATIONS?: NO

## 2025-05-01 SDOH — SOCIAL STABILITY: SOCIAL INSECURITY: HAVE YOU HAD ANY THOUGHTS OF HARMING ANYONE ELSE?: NO

## 2025-05-01 SDOH — SOCIAL STABILITY: SOCIAL INSECURITY: DOES ANYONE TRY TO KEEP YOU FROM HAVING/CONTACTING OTHER FRIENDS OR DOING THINGS OUTSIDE YOUR HOME?: NO

## 2025-05-01 SDOH — SOCIAL STABILITY: SOCIAL INSECURITY: HAS ANYONE EVER THREATENED TO HURT YOUR FAMILY OR YOUR PETS?: NO

## 2025-05-01 SDOH — SOCIAL STABILITY: SOCIAL INSECURITY: ABUSE: ADULT

## 2025-05-01 ASSESSMENT — LIFESTYLE VARIABLES
HOW MANY STANDARD DRINKS CONTAINING ALCOHOL DO YOU HAVE ON A TYPICAL DAY: PATIENT DOES NOT DRINK
AUDIT-C TOTAL SCORE: 0
HAVE YOU EVER FELT YOU SHOULD CUT DOWN ON YOUR DRINKING: NO
HOW OFTEN DO YOU HAVE 6 OR MORE DRINKS ON ONE OCCASION: NEVER
HOW OFTEN DO YOU HAVE A DRINK CONTAINING ALCOHOL: NEVER
EVER HAD A DRINK FIRST THING IN THE MORNING TO STEADY YOUR NERVES TO GET RID OF A HANGOVER: NO
TOTAL SCORE: 0
SKIP TO QUESTIONS 9-10: 1
AUDIT-C TOTAL SCORE: 0
HAVE PEOPLE ANNOYED YOU BY CRITICIZING YOUR DRINKING: NO
EVER FELT BAD OR GUILTY ABOUT YOUR DRINKING: NO

## 2025-05-01 ASSESSMENT — ENCOUNTER SYMPTOMS
NAUSEA: 0
WEAKNESS: 0
LIGHT-HEADEDNESS: 0
VOMITING: 0
CHILLS: 0
ABDOMINAL PAIN: 0
SHORTNESS OF BREATH: 1
PALPITATIONS: 0
DIZZINESS: 0
DIARRHEA: 0
HEMATURIA: 0
COUGH: 0
SORE THROAT: 0
HEADACHES: 0
BACK PAIN: 0
BLOOD IN STOOL: 0
DIFFICULTY URINATING: 0
NUMBNESS: 0
FEVER: 0
AGITATION: 0
DYSURIA: 0
ARTHRALGIAS: 1

## 2025-05-01 ASSESSMENT — COGNITIVE AND FUNCTIONAL STATUS - GENERAL
PATIENT BASELINE BEDBOUND: NO
WALKING IN HOSPITAL ROOM: A LITTLE
MOBILITY SCORE: 21
STANDING UP FROM CHAIR USING ARMS: A LITTLE
DAILY ACTIVITIY SCORE: 24
DAILY ACTIVITIY SCORE: 24
MOBILITY SCORE: 24
MOVING TO AND FROM BED TO CHAIR: A LITTLE

## 2025-05-01 ASSESSMENT — PAIN SCALES - GENERAL
PAINLEVEL_OUTOF10: 0 - NO PAIN
PAINLEVEL_OUTOF10: 0 - NO PAIN
PAINLEVEL_OUTOF10: 5 - MODERATE PAIN
PAINLEVEL_OUTOF10: 0 - NO PAIN

## 2025-05-01 ASSESSMENT — ACTIVITIES OF DAILY LIVING (ADL)
ASSISTIVE_DEVICE: CANE;WALKER
WALKS IN HOME: INDEPENDENT
HEARING - RIGHT EAR: FUNCTIONAL
BATHING: INDEPENDENT
HEARING - LEFT EAR: FUNCTIONAL
GROOMING: INDEPENDENT
LACK_OF_TRANSPORTATION: NO
DRESSING YOURSELF: INDEPENDENT
ADEQUATE_TO_COMPLETE_ADL: YES
TOILETING: INDEPENDENT
JUDGMENT_ADEQUATE_SAFELY_COMPLETE_DAILY_ACTIVITIES: YES
PATIENT'S MEMORY ADEQUATE TO SAFELY COMPLETE DAILY ACTIVITIES?: YES
LACK_OF_TRANSPORTATION: NO
FEEDING YOURSELF: INDEPENDENT

## 2025-05-01 ASSESSMENT — PAIN - FUNCTIONAL ASSESSMENT
PAIN_FUNCTIONAL_ASSESSMENT: 0-10

## 2025-05-01 ASSESSMENT — PAIN DESCRIPTION - LOCATION: LOCATION: CHEST

## 2025-05-01 ASSESSMENT — PAIN DESCRIPTION - PAIN TYPE: TYPE: ACUTE PAIN

## 2025-05-01 NOTE — CARE PLAN
The patient's goals for the shift include      Problem: Fall/Injury  Goal: Not fall by end of shift  Outcome: Progressing  Goal: Be free from injury by end of the shift  Outcome: Progressing  Goal: Verbalize understanding of personal risk factors for fall in the hospital  Outcome: Progressing  Goal: Verbalize understanding of risk factor reduction measures to prevent injury from fall in the home  Outcome: Progressing  Goal: Use assistive devices by end of the shift  Outcome: Progressing  Goal: Pace activities to prevent fatigue by end of the shift  Outcome: Progressing     Problem: Arrythmia/Dysrhythmia  Goal: Lab values return to normal range  Outcome: Progressing  Goal: No evidence of post procedure complications  Outcome: Progressing  Goal: Promote self management  Outcome: Progressing  Goal: Serial ECG will return to baseline  Outcome: Progressing  Goal: Verbalize understanding of procedures/devices  Outcome: Progressing  Goal: Vital signs return to baseline  Outcome: Progressing  Goal: Care and maintenance of device (specify)  Outcome: Progressing     The clinical goals for the shift include patient will remain hds throughout the shit

## 2025-05-01 NOTE — TELEPHONE ENCOUNTER
Good morning. FYI - Patient's son Yovany called answering service this morning stating he is taking her to the ER at [place] for SOB, dizziness, and chest heaviness. Possible afib.

## 2025-05-01 NOTE — H&P
INTERNAL MEDICINE HISTORY AND PHYSICAL  TEACHING SERVICE - BLUE TEAM    Subjective   Norma Villasenor is a 84 y.o. female who presented to the ED with dizziness/lightheadedness.  She lives at home alone with her son.     HPI:  This is an 84-year-old female past medical history significant for A-fib on Eliquis, hypertension, CKD (baseline creatinine around 1.3 s), CAD status post LAD stent in 2007 GERD who is presenting from home for overall feeling weak and unwell.  She states that earlier this morning she woke up, when she tried to ambulate to the restroom she became very dizzy and short of breath.  She became very lightheaded but did not lose consciousness, or fall.  After using the restroom, she subsequently crawled back to bed, but was still not feeling better.  She reached out to her son who brought her to the emergency room.  She has not had any sick contacts, recent illnesses, abdominal pain, constipation, urinary symptoms, new/worsening joint pain, or fevers/chills.  She does not endorse any nausea or her eating habit changes, however her son is at bedside and said she does not eat very much.  She follows with Dr. Villeda outpatient for cardiology and was last seen by him on 3/27/2025.    ED Course:  Vitals: Temperature 97.5, heart rate 36, RR 15, /63, SpO2 100%  Labs were significant for:  ->CMP: Glucose 114, chloride 112, bicarb 17, BUN 48, creatinine 2.25, EGFR 21  ->CBC: No leukocytosis, hemoglobin 10.1 (baseline of 10), hematocrit 31.2, platelets 330  -> Troponin within normal limits, magnesium 2.15  Imaging: Chest x-ray did not show any acute evidence of cardiopulmonary process  EKG: Sinus bradycardia with ventricular rate of 37 bpm, QTc 431, no evidence of acute infarct/ischemia  Interventions: Cardiology/electrophysiology consulted in the emergency room    Medical History[1]  Surgical History[2]  Prescriptions Prior to Admission[3]  Patient has no known allergies.  Social History[4]  Family  History[5]    Review of Systems:  Review of Systems   Constitutional:  Negative for chills and fever.   HENT:  Negative for congestion and sore throat.    Respiratory:  Positive for shortness of breath (Endorses shortness of breath during episode of dizziness upon standing earlier, denies current shortness of breath). Negative for cough.    Cardiovascular:  Negative for chest pain, palpitations and leg swelling.   Gastrointestinal:  Negative for abdominal pain, blood in stool, diarrhea (Endorses soft stools, about 1-2 yesterday, not watery), nausea and vomiting.   Genitourinary:  Negative for difficulty urinating, dysuria and hematuria.   Musculoskeletal:  Positive for arthralgias (Chronic, status post knee replacement on left) and gait problem. Negative for back pain.   Neurological:  Negative for dizziness, weakness, light-headedness, numbness and headaches.   Psychiatric/Behavioral:  Negative for agitation.           Objective   Vitals:  Most Recent:  Vitals:    05/01/25 1130   BP: 171/61   Pulse: (!) 40   Resp: 17   Temp:    SpO2: 99%       24hr Min/Max:  Temp  Min: 36.4 °C (97.5 °F)  Max: 36.4 °C (97.5 °F)  Pulse  Min: 36  Max: 40  BP  Min: 139/63  Max: 171/61  Resp  Min: 15  Max: 20  SpO2  Min: 98 %  Max: 100 %    Lab/Radiology/Diagnostic Review:  Results for orders placed or performed during the hospital encounter of 05/01/25 (from the past 24 hours)   CBC and Auto Differential   Result Value Ref Range    WBC 7.3 4.4 - 11.3 x10*3/uL    nRBC 0.0 0.0 - 0.0 /100 WBCs    RBC 3.28 (L) 4.00 - 5.20 x10*6/uL    Hemoglobin 10.1 (L) 12.0 - 16.0 g/dL    Hematocrit 31.2 (L) 36.0 - 46.0 %    MCV 95 80 - 100 fL    MCH 30.8 26.0 - 34.0 pg    MCHC 32.4 32.0 - 36.0 g/dL    RDW 14.1 11.5 - 14.5 %    Platelets 330 150 - 450 x10*3/uL    Neutrophils % 68.2 40.0 - 80.0 %    Immature Granulocytes %, Automated 0.3 0.0 - 0.9 %    Lymphocytes % 20.7 13.0 - 44.0 %    Monocytes % 6.3 2.0 - 10.0 %    Eosinophils % 4.0 0.0 - 6.0 %     Basophils % 0.5 0.0 - 2.0 %    Neutrophils Absolute 5.01 1.60 - 5.50 x10*3/uL    Immature Granulocytes Absolute, Automated 0.02 0.00 - 0.50 x10*3/uL    Lymphocytes Absolute 1.52 0.80 - 3.00 x10*3/uL    Monocytes Absolute 0.46 0.05 - 0.80 x10*3/uL    Eosinophils Absolute 0.29 0.00 - 0.40 x10*3/uL    Basophils Absolute 0.04 0.00 - 0.10 x10*3/uL   Comprehensive Metabolic Panel   Result Value Ref Range    Glucose 114 (H) 74 - 99 mg/dL    Sodium 137 136 - 145 mmol/L    Potassium 4.9 3.5 - 5.3 mmol/L    Chloride 112 (H) 98 - 107 mmol/L    Bicarbonate 17 (L) 21 - 32 mmol/L    Anion Gap 13 10 - 20 mmol/L    Urea Nitrogen 48 (H) 6 - 23 mg/dL    Creatinine 2.25 (H) 0.50 - 1.05 mg/dL    eGFR 21 (L) >60 mL/min/1.73m*2    Calcium 9.7 8.6 - 10.3 mg/dL    Albumin 4.0 3.4 - 5.0 g/dL    Alkaline Phosphatase 49 33 - 136 U/L    Total Protein 7.3 6.4 - 8.2 g/dL    AST 50 (H) 9 - 39 U/L    Bilirubin, Total 0.3 0.0 - 1.2 mg/dL    ALT 31 7 - 45 U/L   Magnesium   Result Value Ref Range    Magnesium 2.15 1.60 - 2.40 mg/dL   Troponin I, High Sensitivity, Initial   Result Value Ref Range    Troponin I, High Sensitivity 9 0 - 13 ng/L     Imaging  XR chest 1 view  Result Date: 5/1/2025  No evidence of acute cardiopulmonary process.   Signed by: Ivania Alan 5/1/2025 9:20 AM Dictation workstation:   [id number]      Cardiology, Vascular, and Other Imaging  No other imaging results found for the past 7 days       Physical exam:    Physical Exam  Vitals reviewed.   Constitutional:       General: She is not in acute distress.     Appearance: Normal appearance. She is not ill-appearing.   HENT:      Head: Normocephalic and atraumatic.      Right Ear: External ear normal.      Left Ear: External ear normal.   Eyes:      Conjunctiva/sclera: Conjunctivae normal.   Cardiovascular:      Rate and Rhythm: Regular rhythm. Bradycardia present.      Comments: Monitor reading heart rate of 41 at time of examination  Pulmonary:      Effort: Pulmonary effort  is normal. No respiratory distress.      Breath sounds: Normal breath sounds. No stridor. No wheezing, rhonchi or rales.   Abdominal:      Palpations: Abdomen is soft.      Tenderness: There is no abdominal tenderness.   Musculoskeletal:         General: No tenderness.      Cervical back: Normal range of motion.      Right lower leg: No edema.      Left lower leg: No edema.      Comments: Postsurgical scar noted along left knee   Skin:     General: Skin is warm and dry.   Neurological:      General: No focal deficit present.      Mental Status: She is alert. Mental status is at baseline.      Sensory: No sensory deficit.   Psychiatric:         Mood and Affect: Mood normal.         Behavior: Behavior normal.            Assessment /Plan      Assessment/Plan:  This is a 85yo Female who initially presented to the ED with lightheadedness/dizziness. Their PMHx significant for hypertension, bradycardia, A-fib on Eliquis. Upon evaluation in the ED, they were found to have bradycardia to the high 30s as well as an elevated creatinine of 2.25. They have been admitted to the hospital for electrophysiology consult and possible pacemaker placement.  Cardiology/electrophysiology consulted in the emergency room, and they would like to take her for pacemaker placement this afternoon.     # Symptomatic bradycardia  # Dizziness  # Lightheadedness  # History of A-fib on Eliquis  # CAD status post stent in 2007  # HTN  -Heart rate on presentation in the low 30s, and currently low 40s on examination  - Is currently taking metoprolol succinate 25mg, Multaq 400 mg twice daily, Eliquis daily, Norvasc daily  - Symptoms of lightheadedness/dizziness likely in the setting of bradycardia  -Plan:  - Electrophysiology consulted, appreciate recommendations  --> Plan to insert pacemaker this afternoon, will remain n.p.o. until after procedure, will reassess when to restart Eliquis after procedures done to assess bleeding risk  -Await cardiology  recommendations on future medication regimen status post pacemaker  -Plan for chest x-ray tomorrow postplacement  - Daily labs to monitor electrolytes and optimize with K>2 and Mag >2  - Vitals every 4 hours    #ULI on CKD (baseline~1.3)  - Creatinine elevated at 2.25, BUN 48  - ULI likely prerenal in the setting of bradycardia and overall poor p.o. intake, anticipated to improve after surgery with adequate oral intake  - Consider IV fluid to help with renal recovery if creatinine improving  - Daily RFP to trend electrolytes as well as renal function  - Avoid nephrotoxins    # GERD  -Continue home medications as appropriate        Consultants: Cardiology, electrophysiology  Diet: N.p.o. until after pacemaker placement  Code Status: Full code  DVT Prophylaxis: Hold chemical prophylaxis until after procedure, likely to continue with home Eliquis; okay for SCDs  Disposition: Pending improvement after pacemaker placement    This assessment and plan has been discussed with the attending physician Dr. Evans.    Yuni Craig, DO  Family Medicine, PGY-2    This note may have been transcribed using Dragon voice recognition system and there is a possibility of unintentional typing misprints.  Any information found to be copied from previous providers is done in the best interest of the patient to provide accurate, quality, and continuity of care.           [1]   Past Medical History:  Diagnosis Date    GERD (gastroesophageal reflux disease)     Hypertension    [2]   Past Surgical History:  Procedure Laterality Date    CHOLECYSTECTOMY      COLONOSCOPY      CORONARY STENT PLACEMENT      OTHER SURGICAL HISTORY      UPPER GASTROINTESTINAL ENDOSCOPY     [3] (Not in a hospital admission)   [4]   Social History  Tobacco Use    Smoking status: Never     Passive exposure: Past    Smokeless tobacco: Never   Substance Use Topics    Alcohol use: Not Currently    Drug use: Never   [5] No family history on file.

## 2025-05-01 NOTE — ED PROVIDER NOTES
Emergency Department Provider Note       History of Present Illness     History provided by: Patient  Limitations to History: None  External Records Reviewed with Brief Summary: None    HPI:  Norma Villasenor is a 84 y.o. female with a history of junctional bradycardia in the past presents emergency department after having a near fainting episode at home today at around 5 in the morning.  She reports awakening from sleep to use the restroom and feeling very lightheaded.  She was able to keep her cell from falling by holding onto her bed rail.  She was able to get herself back into bed and felt very dizzy and unwell.  She states she had no spinning.  No weakness of her arms and legs.  No vomiting.  No chest pain.  She states she is never had anything like this in the past.    Physical Exam   Triage vitals:  T 36.4 °C (97.5 °F)  HR (!) 36  /63  RR 15  O2 100 % None (Room air)    Physical Exam  Well-appearing Pink and perfused elderly woman in no acute distress.  Speaking clearly, alert and oriented.  No increased work of breathing.  Clear lungs.  Slow heart rate.  No pedal edema.  Soft nontender abdomen.  Clear lungs.      Medical Decision Making & ED Course   Medical Decision Makin y.o. female with severe symptomatic bradycardia.  Near syncopal event related to sinus bradycardia.  Discussed with cardiology who recommended holding off on a temporary pacemaker wire, keep patient in bed and n.p.o.  Pending evaluation and management plan per cardiology team.  Electrophysiology Dr. Porter aware.  Dr. Mckenzie on consult.  No evidence of STEMI.  She does have mild worsening of her ULI.  ----       Differential diagnoses considered include but are not limited to: Beta-blocker toxicity considered but patient is not taking excessive doses.  She is on 25 mg at bedtime.      The patient was discussed with the following consultants/services: Cardiology as noted    Care Considerations: As documented above in MDM    ED  Course:  ED Course as of 05/01/25 1042   Thu May 01, 2025   0947 Spoke with Dr. Mckenzie and Dr. Villeda, will involve Dr. Porter to consider potential need for pacemaker placement.  Patient and family updated.  With the exception of some renal insufficiency, no significant abnormalities on labs. [JW]      ED Course User Index  [JW] Yelena Maloney MD         Diagnoses as of 05/01/25 1042   Symptomatic bradycardia   Junctional bradycardia   Chest pain, unspecified type   Near syncope   ULI (acute kidney injury)       Disposition   As a result of their workup, the patient will require admission to the hospital.  The patient was informed of her diagnosis.  The patient was given the opportunity to ask questions and I answered them. The patient agreed to be admitted to the hospital.    Procedures   Procedures    Patient was seen independently    Yelena Maloney MD  Emergency Medicine                                                            Yelena Maloney MD  05/01/25 1049

## 2025-05-01 NOTE — CONSULTS
Inpatient consult to Cardiology  Consult performed by: Lucia Varma MD  Consult ordered by: Yelena Maloney MD  Reason for consult: Bradycardia  Assessment/Recommendations: Pacemaker placement        History Of Present Illness:    Norma Villasenor is a 84 y.o. female presenting with persistent atrial fibrillation on apixaban, hypertension, CAD s/p PCI (), who presents with lightheadedness, dizziness, and near syncope this morning. In the ED she was found to be in junctional bradycardia with heart rates in the 30s-40s. She last took her eliquis last night. She currently feels ok at rest but feels very dizzy with any exertion.        Last Recorded Vitals:  Vitals:    25 1100 25 1115 25 1130 25 1348   BP: 151/57  171/61 160/70   BP Location: Right arm  Right arm Right arm   Patient Position: Sitting  Sitting Lying   Pulse: (!) 36 (!) 40 (!) 40 60   Resp: 20 20 17 16   Temp:    36.5 °C (97.7 °F)   TempSrc:    Temporal   SpO2: 99% 99% 99% 97%   Weight:       Height:           Last Labs:  CBC - 2025:  8:57 AM  7.3 10.1 330    31.2      CMP - 2025:  8:57 AM  9.7 7.3 50 --- 0.3   2.3 4.0 31 49      PTT - No results in last year.  _   _ _     Troponin I, High Sensitivity   Date/Time Value Ref Range Status   2025 10:20 AM 9 0 - 13 ng/L Final   2025 08:57 AM 9 0 - 13 ng/L Final   2025 12:47 AM 8 0 - 13 ng/L Final     BNP   Date/Time Value Ref Range Status   2025 09:23 AM 89 0 - 99 pg/mL Final      Last I/O:  No intake/output data recorded.    Past Cardiology Tests (Last 3 Years):  EK2025: Marked sinus bradycardia, HR 38bpnm    Echo:  Transthoracic Echo (TTE) Complete 2025    Ejection Fractions:  EF   Date/Time Value Ref Range Status   2025 02:30 PM 63 %      Cath:  No results found for this or any previous visit from the past 1095 days.    Stress Test:  No results found for this or any previous visit from the past 1095 days.    Cardiac Imaging:  No  results found for this or any previous visit from the past 1095 days.      Past Medical History:  She has a past medical history of GERD (gastroesophageal reflux disease) and Hypertension.    Past Surgical History:  She has a past surgical history that includes Other surgical history; Coronary stent placement; Colonoscopy; Upper gastrointestinal endoscopy; Cholecystectomy; and Cardiac electrophysiology procedure (Left, 5/1/2025).      Social History:  She reports that she has never smoked. She has been exposed to tobacco smoke. She has never used smokeless tobacco. She reports that she does not currently use alcohol. She reports that she does not use drugs.    Family History:  Family History[1]     Allergies:  Patient has no known allergies.    Inpatient Medications:  Scheduled Medications[2]  PRN Medications[3]  Continuous Medications[4]  Outpatient Medications:  Current Outpatient Medications   Medication Instructions    allopurinol (ZYLOPRIM) 100 mg, Daily    amLODIPine (NORVASC) 5 mg, oral, Daily    apixaban (ELIQUIS) 2.5 mg, oral, Every 12 hours    calcitriol (ROCALTROL) 0.25 mcg, Daily    dicyclomine (BENTYL) 10 mg, 3 times daily PRN    dronedarone (MULTAQ) 400 mg, oral, 2 times daily    fluticasone furoate-vilanteroL (Breo Ellipta) 200-25 mcg/dose inhaler 1 puff, Daily    gabapentin (NEURONTIN) 100 mg, Nightly    meclizine (ANTIVERT) 25 mg, 3 times daily PRN    metoprolol succinate XL (TOPROL-XL) 25 mg, oral, Nightly, Do not crush or chew.    pantoprazole (PROTONIX) 40 mg, Daily    potassium chloride CR 20 mEq ER tablet 20 mEq, Daily    rosuvastatin (CRESTOR) 5 mg, Nightly    tiZANidine (ZANAFLEX) 2 mg, Nightly       Physical Exam:  Gen: NAD, sitting comfortably  HEENT: NC/AT  Card: Bradycardic  Pulm: normal work of breathing  Ext: No LE edema  Neuro: No focal deficits       Assessment/Plan   84 year old female with symptomatic sinus bradycardia and junctional bradycardia. She has been on low-dose  beta-blocker for quite some time, despite this has very symptomatic bradycardia. Additionally, she has previously had tachycardia requiring rate control. Thus will need permanent pacemaker placement for sick sinus syndrome/tachybrady syndrome.    Recommendations:  - NPO  - Hold anticoagulation  - Plan for dual chamber pacemaker placement left-sided  - Given ULI will avoid contrast          Peripheral IV 05/01/25 20 G Anterior;Right Forearm (Active)   Present on Admission to Healthcare Facility No 05/01/25 1400   Site Assessment Clean;Dry;Intact 05/01/25 1400   Dressing Type Transparent 05/01/25 1400   Line Status Flushed 05/01/25 1400   Dressing Status Clean;Dry;Occlusive 05/01/25 1400   Number of days: 0       Code Status:  Full Code    I spent 45 minutes in the professional and overall care of this patient.        Lucia Varma MD       [1] No family history on file.  [2]   Scheduled medications   Medication Dose Route Frequency    cefadroxil  500 mg oral q24h    polyethylene glycol  17 g oral Daily   [3]   PRN medications   Medication    acetaminophen    oxyCODONE   [4]   Continuous Medications   Medication Dose Last Rate

## 2025-05-02 ENCOUNTER — APPOINTMENT (OUTPATIENT)
Dept: RADIOLOGY | Facility: HOSPITAL | Age: 85
End: 2025-05-02
Payer: MEDICARE

## 2025-05-02 ENCOUNTER — PHARMACY VISIT (OUTPATIENT)
Dept: PHARMACY | Facility: CLINIC | Age: 85
End: 2025-05-02
Payer: COMMERCIAL

## 2025-05-02 ENCOUNTER — DOCUMENTATION (OUTPATIENT)
Dept: HOME HEALTH SERVICES | Facility: HOME HEALTH | Age: 85
End: 2025-05-02
Payer: MEDICARE

## 2025-05-02 ENCOUNTER — HOME HEALTH ADMISSION (OUTPATIENT)
Dept: HOME HEALTH SERVICES | Facility: HOME HEALTH | Age: 85
End: 2025-05-02
Payer: MEDICARE

## 2025-05-02 VITALS
RESPIRATION RATE: 20 BRPM | WEIGHT: 118.17 LBS | TEMPERATURE: 97.9 F | OXYGEN SATURATION: 98 % | HEIGHT: 56 IN | SYSTOLIC BLOOD PRESSURE: 145 MMHG | BODY MASS INDEX: 26.58 KG/M2 | HEART RATE: 88 BPM | DIASTOLIC BLOOD PRESSURE: 69 MMHG

## 2025-05-02 PROBLEM — R00.1 SYMPTOMATIC BRADYCARDIA: Status: RESOLVED | Noted: 2025-05-01 | Resolved: 2025-05-02

## 2025-05-02 LAB
ALBUMIN SERPL BCP-MCNC: 3.8 G/DL (ref 3.4–5)
ANION GAP SERPL CALC-SCNC: 14 MMOL/L (ref 10–20)
ATRIAL RATE: 35 BPM
ATRIAL RATE: 37 BPM
BUN SERPL-MCNC: 33 MG/DL (ref 6–23)
CALCIUM SERPL-MCNC: 9.4 MG/DL (ref 8.6–10.3)
CHLORIDE SERPL-SCNC: 112 MMOL/L (ref 98–107)
CO2 SERPL-SCNC: 17 MMOL/L (ref 21–32)
CREAT SERPL-MCNC: 1.45 MG/DL (ref 0.5–1.05)
EGFRCR SERPLBLD CKD-EPI 2021: 36 ML/MIN/1.73M*2
ERYTHROCYTE [DISTWIDTH] IN BLOOD BY AUTOMATED COUNT: 13.6 % (ref 11.5–14.5)
GLUCOSE SERPL-MCNC: 102 MG/DL (ref 74–99)
HCT VFR BLD AUTO: 29.4 % (ref 36–46)
HGB BLD-MCNC: 9.6 G/DL (ref 12–16)
MAGNESIUM SERPL-MCNC: 1.84 MG/DL (ref 1.6–2.4)
MCH RBC QN AUTO: 30.4 PG (ref 26–34)
MCHC RBC AUTO-ENTMCNC: 32.7 G/DL (ref 32–36)
MCV RBC AUTO: 93 FL (ref 80–100)
NRBC BLD-RTO: 0 /100 WBCS (ref 0–0)
P AXIS: -15 DEGREES
P AXIS: 21 DEGREES
P OFFSET: 202 MS
P OFFSET: 207 MS
P ONSET: 157 MS
P ONSET: 158 MS
PHOSPHATE SERPL-MCNC: 3.4 MG/DL (ref 2.5–4.9)
PLATELET # BLD AUTO: 263 X10*3/UL (ref 150–450)
POTASSIUM SERPL-SCNC: 3.6 MMOL/L (ref 3.5–5.3)
PR INTERVAL: 130 MS
PR INTERVAL: 132 MS
Q ONSET: 222 MS
Q ONSET: 224 MS
QRS COUNT: 6 BEATS
QRS COUNT: 7 BEATS
QRS DURATION: 84 MS
QRS DURATION: 84 MS
QT INTERVAL: 550 MS
QT INTERVAL: 558 MS
QTC CALCULATION(BAZETT): 425 MS
QTC CALCULATION(BAZETT): 431 MS
QTC FREDERICIA: 465 MS
QTC FREDERICIA: 468 MS
R AXIS: 55 DEGREES
R AXIS: 61 DEGREES
RBC # BLD AUTO: 3.16 X10*6/UL (ref 4–5.2)
SODIUM SERPL-SCNC: 139 MMOL/L (ref 136–145)
T AXIS: 54 DEGREES
T AXIS: 60 DEGREES
T OFFSET: 497 MS
T OFFSET: 503 MS
VENTRICULAR RATE: 35 BPM
VENTRICULAR RATE: 37 BPM
WBC # BLD AUTO: 6.3 X10*3/UL (ref 4.4–11.3)

## 2025-05-02 PROCEDURE — 2500000001 HC RX 250 WO HCPCS SELF ADMINISTERED DRUGS (ALT 637 FOR MEDICARE OP): Performed by: INTERNAL MEDICINE

## 2025-05-02 PROCEDURE — 71046 X-RAY EXAM CHEST 2 VIEWS: CPT

## 2025-05-02 PROCEDURE — RXMED WILLOW AMBULATORY MEDICATION CHARGE

## 2025-05-02 PROCEDURE — 80069 RENAL FUNCTION PANEL: CPT

## 2025-05-02 PROCEDURE — 2500000001 HC RX 250 WO HCPCS SELF ADMINISTERED DRUGS (ALT 637 FOR MEDICARE OP)

## 2025-05-02 PROCEDURE — 99222 1ST HOSP IP/OBS MODERATE 55: CPT | Performed by: INTERNAL MEDICINE

## 2025-05-02 PROCEDURE — 71046 X-RAY EXAM CHEST 2 VIEWS: CPT | Performed by: STUDENT IN AN ORGANIZED HEALTH CARE EDUCATION/TRAINING PROGRAM

## 2025-05-02 PROCEDURE — 36415 COLL VENOUS BLD VENIPUNCTURE: CPT

## 2025-05-02 PROCEDURE — 85027 COMPLETE CBC AUTOMATED: CPT

## 2025-05-02 PROCEDURE — 2500000002 HC RX 250 W HCPCS SELF ADMINISTERED DRUGS (ALT 637 FOR MEDICARE OP, ALT 636 FOR OP/ED)

## 2025-05-02 PROCEDURE — 97161 PT EVAL LOW COMPLEX 20 MIN: CPT | Mod: GP | Performed by: PHYSICAL THERAPIST

## 2025-05-02 PROCEDURE — 97116 GAIT TRAINING THERAPY: CPT | Mod: GP | Performed by: PHYSICAL THERAPIST

## 2025-05-02 PROCEDURE — 83735 ASSAY OF MAGNESIUM: CPT

## 2025-05-02 PROCEDURE — 2500000004 HC RX 250 GENERAL PHARMACY W/ HCPCS (ALT 636 FOR OP/ED)

## 2025-05-02 PROCEDURE — 99239 HOSP IP/OBS DSCHRG MGMT >30: CPT

## 2025-05-02 RX ORDER — ROSUVASTATIN CALCIUM 5 MG/1
5 TABLET, COATED ORAL NIGHTLY
Status: DISCONTINUED | OUTPATIENT
Start: 2025-05-02 | End: 2025-05-02 | Stop reason: HOSPADM

## 2025-05-02 RX ORDER — LANOLIN ALCOHOL/MO/W.PET/CERES
400 CREAM (GRAM) TOPICAL ONCE
Status: COMPLETED | OUTPATIENT
Start: 2025-05-02 | End: 2025-05-02

## 2025-05-02 RX ORDER — PANTOPRAZOLE SODIUM 40 MG/1
40 TABLET, DELAYED RELEASE ORAL DAILY
Status: DISCONTINUED | OUTPATIENT
Start: 2025-05-02 | End: 2025-05-02 | Stop reason: HOSPADM

## 2025-05-02 RX ORDER — SODIUM BICARBONATE 650 MG/1
650 TABLET ORAL 2 TIMES DAILY
Qty: 12 TABLET | Refills: 0 | Status: SHIPPED | OUTPATIENT
Start: 2025-05-02 | End: 2025-05-08

## 2025-05-02 RX ORDER — CEFADROXIL 500 MG/1
500 CAPSULE ORAL EVERY 24 HOURS
Qty: 5 CAPSULE | Refills: 0 | Status: SHIPPED | OUTPATIENT
Start: 2025-05-03 | End: 2025-05-08

## 2025-05-02 RX ORDER — METOPROLOL SUCCINATE 25 MG/1
25 TABLET, EXTENDED RELEASE ORAL NIGHTLY
Status: DISCONTINUED | OUTPATIENT
Start: 2025-05-02 | End: 2025-05-02 | Stop reason: HOSPADM

## 2025-05-02 RX ORDER — METOPROLOL TARTRATE 25 MG/1
25 TABLET, FILM COATED ORAL 2 TIMES DAILY
Status: DISCONTINUED | OUTPATIENT
Start: 2025-05-02 | End: 2025-05-02 | Stop reason: HOSPADM

## 2025-05-02 RX ORDER — CEFADROXIL 500 MG/1
500 CAPSULE ORAL EVERY 24 HOURS
Status: DISCONTINUED | OUTPATIENT
Start: 2025-05-02 | End: 2025-05-02 | Stop reason: HOSPADM

## 2025-05-02 RX ORDER — POTASSIUM CHLORIDE 20 MEQ/1
40 TABLET, EXTENDED RELEASE ORAL ONCE
Status: COMPLETED | OUTPATIENT
Start: 2025-05-02 | End: 2025-05-02

## 2025-05-02 RX ORDER — SODIUM BICARBONATE 650 MG/1
650 TABLET ORAL 2 TIMES DAILY
Status: DISCONTINUED | OUTPATIENT
Start: 2025-05-02 | End: 2025-05-02 | Stop reason: HOSPADM

## 2025-05-02 RX ORDER — TIZANIDINE 2 MG/1
2 TABLET ORAL NIGHTLY
Status: DISCONTINUED | OUTPATIENT
Start: 2025-05-02 | End: 2025-05-02 | Stop reason: HOSPADM

## 2025-05-02 RX ADMIN — CEFADROXIL 500 MG: 500 CAPSULE ORAL at 09:42

## 2025-05-02 RX ADMIN — ASPIRIN 81 MG: 81 TABLET, COATED ORAL at 09:43

## 2025-05-02 RX ADMIN — Medication 400 MG: at 09:43

## 2025-05-02 RX ADMIN — AMLODIPINE BESYLATE 5 MG: 5 TABLET ORAL at 09:42

## 2025-05-02 RX ADMIN — POTASSIUM CHLORIDE 40 MEQ: 1500 TABLET, EXTENDED RELEASE ORAL at 10:09

## 2025-05-02 RX ADMIN — PANTOPRAZOLE SODIUM 40 MG: 40 TABLET, DELAYED RELEASE ORAL at 09:42

## 2025-05-02 RX ADMIN — DRONEDARONE 400 MG: 400 TABLET, FILM COATED ORAL at 15:33

## 2025-05-02 RX ADMIN — SODIUM BICARBONATE 650 MG: 650 TABLET ORAL at 13:30

## 2025-05-02 ASSESSMENT — COGNITIVE AND FUNCTIONAL STATUS - GENERAL
DAILY ACTIVITIY SCORE: 20
HELP NEEDED FOR BATHING: A LITTLE
WALKING IN HOSPITAL ROOM: A LITTLE
MOBILITY SCORE: 21
DRESSING REGULAR UPPER BODY CLOTHING: A LITTLE
TURNING FROM BACK TO SIDE WHILE IN FLAT BAD: A LITTLE
CLIMB 3 TO 5 STEPS WITH RAILING: A LITTLE
TOILETING: A LITTLE
WALKING IN HOSPITAL ROOM: A LITTLE
DRESSING REGULAR LOWER BODY CLOTHING: A LITTLE
CLIMB 3 TO 5 STEPS WITH RAILING: A LITTLE
MOVING TO AND FROM BED TO CHAIR: A LITTLE
STANDING UP FROM CHAIR USING ARMS: A LITTLE
MOVING FROM LYING ON BACK TO SITTING ON SIDE OF FLAT BED WITH BEDRAILS: A LITTLE
MOBILITY SCORE: 18
STANDING UP FROM CHAIR USING ARMS: A LITTLE

## 2025-05-02 ASSESSMENT — PAIN SCALES - GENERAL
PAINLEVEL_OUTOF10: 0 - NO PAIN

## 2025-05-02 ASSESSMENT — PAIN - FUNCTIONAL ASSESSMENT
PAIN_FUNCTIONAL_ASSESSMENT: 0-10

## 2025-05-02 ASSESSMENT — ACTIVITIES OF DAILY LIVING (ADL): ADL_ASSISTANCE: INDEPENDENT

## 2025-05-02 NOTE — NURSING NOTE
Discharged per wheelchair at this time,  grandson driving her home. Meds were given by pharmacy, instructions reviewed with grandson who is a nursing student and his mother is a nurse, patient lives with them but manages her own meds. All belongings taken.

## 2025-05-02 NOTE — CARE PLAN
Problem: Fall/Injury  Goal: Not fall by end of shift  Outcome: Met  Goal: Be free from injury by end of the shift  Outcome: Met  Goal: Verbalize understanding of personal risk factors for fall in the hospital  Outcome: Met  Goal: Verbalize understanding of risk factor reduction measures to prevent injury from fall in the home  Outcome: Met  Goal: Use assistive devices by end of the shift  Outcome: Met  Goal: Pace activities to prevent fatigue by end of the shift  Outcome: Met     Problem: Arrythmia/Dysrhythmia  Goal: Lab values return to normal range  Outcome: Met  Goal: No evidence of post procedure complications  Outcome: Met  Goal: Promote self management  Outcome: Met  Goal: Serial ECG will return to baseline  Outcome: Met  Goal: Verbalize understanding of procedures/devices  Outcome: Met  Goal: Vital signs return to baseline  Outcome: Met  Goal: Care and maintenance of device (specify)  Outcome: Met   The patient's goals for the shift include      The clinical goals for the shift include Discharge    Over the shift, the patient did not make progress toward the following goals. Barriers to progression include none. Recommendations to address these barriers include discharge.

## 2025-05-02 NOTE — HH CARE COORDINATION
Home Care received a Referral for Physical Therapy and Occupational Therapy. We have processed the referral for a Start of Care on 05/04/2025.     If you have any questions or concerns, please feel free to contact us at [phone]. Follow the prompts, enter your five digit zip code, and you will be directed to your care team on [facility].

## 2025-05-02 NOTE — CONSULTS
Consults  History Of Present Illness:    Norma Villasenor is a 84 y.o. female presenting with persistent atrial fibrillation on apixaban, hypertension, CAD s/p PCI (2007), who presents with lightheadedness, dizziness, and near syncope this morning. In the ED she was found to be in junctional bradycardia with heart rates in the 30s-40s. She last took her eliquis last night. She currently feels ok at rest but feels very dizzy with any exertion.     She did well overnight.  On telemetry she has bouts of atrial fibrillation with rapid ventricular rate.  She is asymptomatic with these episodes.  On an outpatient basis she was on dronedarone 400 mg p.o. twice daily, and we will resume that.  We will also resume her beta-blockers today.  I have a call out to EP about resuming Eliquis.  Chest x-ray does not show any pneumothorax, the leads are in great position.  Her heart rate is hovering in the 70s.  Blood pressure 140s to 160s systolic, afebrile  Her lungs are clear, heart sounds are regular without murmur rub or gallop, the pacer generator site has dressing intact without any bleeding tenderness or ecchymosis.    Assessment:  1.  Tachybradycardia syndrome/sinus node dysfunction  2.  Paroxysmal atrial fibrillation with rapid ventricular rate  3.  Long-term anticoagulation with Eliquis 2.5 mg p.o. twice daily  4.  Primary hypertension  5.  Chronic kidney disease stage IIIb  6.  Hypokalemia  7.  Status post dual-chamber permanent pacemaker implantation without incident  8.  CAD status post PCI 2007  9.  [id number] score 5  10.  Hemoglobin and hematocrit have dropped slightly, patient has chronic anemia, I do not suspect any acute bleeding issues      Recommendations:  1.  I would hold off on aspirin for a week and then resume  2.  Eliquis 2.5 mg p.o. twice daily first dose tonight  3.  Multaq 400 mg p.o. twice daily to start now  4.  Resume metoprolol succinate 25 mg at bedtime  5.  Potassium 40 mEq p.o. x 1 if not already  repleted      Discussed with EP service, Dr. Varma, okay to discharge home today  He recommends resuming Eliquis tonight  Will also resume metoprolol  Discharge planning either today , follow-up with Dr. Varma has been arranged  Cardiology follow-up with Dr. Villeda preferably within 2 weeks  Thankyou !!  Rosy Mckenzie MD [facility]     Last Recorded Vitals:  Vitals:    25 0400 25 0430 25 0800 25 1200   BP:  168/77 144/84    BP Location:  Right arm Left arm    Patient Position:  Lying Sitting    Pulse: 72 77 74 90   Resp: 23 26 20    Temp:  36.1 °C (97 °F) 36.6 °C (97.9 °F)    TempSrc:  Temporal Temporal    SpO2:  98% 98%    Weight: 53.6 kg (118 lb 2.7 oz)      Height:           Last Labs:  CBC - 2025:  5:33 AM  6.3 9.6 263    29.4      CMP - 2025:  5:33 AM  9.4 7.3 50 --- 0.3   3.4 3.8 31 49      PTT - No results in last year.  _   _ _     Troponin I, High Sensitivity   Date/Time Value Ref Range Status   2025 10:20 AM 9 0 - 13 ng/L Final   2025 08:57 AM 9 0 - 13 ng/L Final   2025 12:47 AM 8 0 - 13 ng/L Final     BNP   Date/Time Value Ref Range Status   2025 09:23 AM 89 0 - 99 pg/mL Final      Last I/O:  I/O last 3 completed shifts:  In: 730 (13.6 mL/kg) [P.O.:530; IV Piggyback:200]  Out: 660 (12.3 mL/kg) [Urine:650 (0.3 mL/kg/hr); Blood:10]  Weight: 53.6 kg     Past Cardiology Tests (Last 3 Years):  EK2025: Marked sinus bradycardia, HR 38bpnm    Echo:  Transthoracic Echo (TTE) Complete 2025    Ejection Fractions:  EF   Date/Time Value Ref Range Status   2025 02:30 PM 63 %      Cath:  No results found for this or any previous visit from the past 1095 days.    Stress Test:  No results found for this or any previous visit from the past 1095 days.    Cardiac Imaging:  No results found for this or any previous visit from the past 1095 days.      Past Medical History:  She has a past medical history of GERD (gastroesophageal reflux disease) and  Hypertension.    Past Surgical History:  She has a past surgical history that includes Other surgical history; Coronary stent placement; Colonoscopy; Upper gastrointestinal endoscopy; Cholecystectomy; and Cardiac electrophysiology procedure (Left, 5/1/2025).      Social History:  She reports that she has never smoked. She has been exposed to tobacco smoke. She has never used smokeless tobacco. She reports that she does not currently use alcohol. She reports that she does not use drugs.    Family History:  Family History[1]     Allergies:  Patient has no known allergies.    Inpatient Medications:  Scheduled Medications[2]  PRN Medications[3]  Continuous Medications[4]  Outpatient Medications:  Current Outpatient Medications   Medication Instructions    amLODIPine (NORVASC) 5 mg, oral, Daily    apixaban (ELIQUIS) 2.5 mg, oral, Every 12 hours    aspirin 81 mg, oral, Daily    calcitriol (ROCALTROL) 0.25 mcg, oral, Daily    dicyclomine (BENTYL) 10 mg, oral, 3 times daily PRN    dronedarone (MULTAQ) 400 mg, oral, 2 times daily    fluticasone furoate-vilanteroL (Breo Ellipta) 200-25 mcg/dose inhaler 1 puff, inhalation, Daily    meclizine (ANTIVERT) 25 mg, oral, 3 times daily PRN    metoprolol succinate XL (TOPROL-XL) 25 mg, oral, Nightly, Do not crush or chew.    metoprolol tartrate (LOPRESSOR) 25 mg, oral, 2 times daily    pantoprazole (PROTONIX) 40 mg, oral, Daily    potassium chloride CR 20 mEq ER tablet 20 mEq, oral, Daily    rosuvastatin (CRESTOR) 5 mg, oral, Nightly    tiZANidine (ZANAFLEX) 2 mg, oral, Nightly           Peripheral IV 05/01/25 20 G Anterior;Right Forearm (Active)   Present on Admission to Healthcare Facility No 05/01/25 1400   Site Assessment Clean;Dry;Intact 05/01/25 1400   Dressing Type Transparent 05/01/25 1400   Line Status Flushed 05/01/25 1400   Dressing Status Clean;Dry;Occlusive 05/01/25 1400   Number of days: 0       Code Status:  Full Code    I spent 45 minutes in the professional and  overall care of this patient.        Rosy Mckenzie MD       [1] No family history on file.  [2]   Scheduled medications   Medication Dose Route Frequency    amLODIPine  5 mg oral Daily    [Held by provider] apixaban  2.5 mg oral q12h    aspirin  81 mg oral Daily    cefadroxil  500 mg oral q24h    [Held by provider] metoprolol succinate XL  25 mg oral Nightly    [Held by provider] metoprolol tartrate  25 mg oral BID    pantoprazole  40 mg oral Daily    polyethylene glycol  17 g oral Daily    rosuvastatin  5 mg oral Nightly    sodium bicarbonate  650 mg oral BID    tiZANidine  2 mg oral Nightly   [3]   PRN medications   Medication    acetaminophen    oxyCODONE   [4]   Continuous Medications   Medication Dose Last Rate

## 2025-05-02 NOTE — PROGRESS NOTES
Physical Therapy    Physical Therapy Evaluation    Patient Name: Norma Villasenor  MRN: [id number]  Today's Date: 5/2/2025   Time Calculation  Start Time: 1345  Stop Time: 1405  Time Calculation (min): 20 min  2104/2104-B    Assessment/Plan   PT Assessment  PT Assessment Results: Decreased strength, Impaired balance, Decreased endurance, Decreased mobility, Decreased safety awareness  Rehab Prognosis: Good  Evaluation/Treatment Tolerance: Patient tolerated treatment well  Medical Staff Made Aware: Yes  End of Session Communication: Bedside nurse  Assessment Comment: Pt is a 84 y.o. female admitted for Junctional bradycardia [R00.1]  ULI (acute kidney injury) [N17.9]  Near syncope [R55]  Symptomatic bradycardia [R00.1]  Chest pain, unspecified type [R07.9] on 5/1/2025. Pt below functional level and will benefit from skilled therapy during stay to improve overall functional mobility, strength, ROM, endurance and safety awareness. Upon discharge pt will benefit from low intensity therapy for continued improvement in functional mobility. Therapy will continue to follow and reassess each session.      End of Session Patient Position: Bed, 3 rail up, Alarm on  IP OR SWING BED PT PLAN  Inpatient or Swing Bed: Inpatient  PT Plan  PT Plan: PT Eval only  PT Eval Only Reason: Safe to return home  PT Frequency: PT eval only  PT Discharge Recommendations: Low intensity level of continued care  Equipment Recommended upon Discharge: Straight cane  PT Recommended Transfer Status: Stand by assist  PT - OK to Discharge: Yes    Subjective     Current Problem:  1. Symptomatic bradycardia  Case Request EP Lab: PPM IMPLANT DUAL    Case Request EP Lab: PPM IMPLANT DUAL    Electrophysiology procedure    Electrophysiology procedure    Cardiac device check - In Clinic    cefadroxil (Duricef) 500 mg capsule    sodium bicarbonate 650 mg tablet    Renal function panel    Referral to Home Care    Referral to Healthy at Home Program      2. Junctional  bradycardia  Referral to Healthy at Home Program      3. Chest pain, unspecified type        4. Near syncope        5. ULI (acute kidney injury)        6. Paroxysmal atrial fibrillation (Multi)  Referral to Healthy at Home Program        Problem List[1]    General Visit Information:  General  Reason for Referral: impaired mobility for PPM  Referred By: Dr. Evans  Past Medical History Relevant to Rehab: 1.  Tachybradycardia syndrome/sinus node dysfunction  2.  Paroxysmal atrial fibrillation with rapid ventricular rate  3.  Long-term anticoagulation with Eliquis 2.5 mg p.o. twice daily  4.  Primary hypertension  5.  Chronic kidney disease stage IIIb  6.  Hypokalemia  7.  Status post dual-chamber permanent pacemaker implantation without incident  Prior to Session Communication: Bedside nurse  Patient Position Received: Bed, 3 rail up, Alarm on  Preferred Learning Style: kinesthetic, verbal  General Comment: Pt agreeable to therapy    Home Living:  Home Living  Type of Home: House  Lives With:  (family)  Home Adaptive Equipment: Cane    Prior Level of Function:  Prior Function Per Pt/Caregiver Report  Level of [place]: Independent with ADLs and functional transfers, Independent with homemaking with ambulation  Receives Help From: Family  ADL Assistance: Independent    Precautions:  Precautions  Medical Precautions: Fall precautions  Post-Surgical Precautions:  (pacemaker precautions)  Braces Applied: shoulder sling adjusted (Pt educated on PPM precautions- no horizontal abduction, shoulder flexion >90s, shoulder extension, no lifting anything >5-10 pounds with left arm.)    Vital Signs:     Objective     Pain:  Pain Assessment  Pain Assessment: 0-10  0-10 (Numeric) Pain Score: 0 - No pain    Cognition:  Cognition  Overall Cognitive Status: Within Functional Limits  Orientation Level: Oriented X4    General Assessments:      Static Sitting Balance  Static Sitting-Comment/Number of Minutes: good  Dynamic Sitting  Balance  Dynamic Sitting-Comments: good  Static Standing Balance  Static Standing-Comment/Number of Minutes: good  Dynamic Standing Balance  Dynamic Standing-Comments: good with cane    Functional Assessments:        Transfers  Transfer: Yes  Transfer 1  Transfer From 1: Chair with arms to  Transfer to 1: Stand  Technique 1: Sit to stand, Stand to sit  Transfer Device 1: Cane  Transfer Level of Assistance 1: Close supervision  Ambulation/Gait Training  Ambulation/Gait Training Performed: Yes  Ambulation/Gait Training 1  Surface 1: Level tile  Device 1: Single point cane  Gait Support Devices: Gait belt  Assistance 1: Close supervision  Quality of Gait 1:  (slow kwaku)  Comments/Distance (ft) 1: 120          Extremity/Trunk Assessments:        RLE   RLE : Within Functional Limits  LLE   LLE : Within Functional Limits    Outcome Measures:     [facility] Basic Mobility  Turning from your back to your side while in a flat bed without using bedrails: None  Moving from lying on your back to sitting on the side of a flat bed without using bedrails: None  Moving to and from bed to chair (including a wheelchair): None  Standing up from a chair using your arms (e.g. wheelchair or bedside chair): A little  To walk in hospital room: A little  Climbing 3-5 steps with railing: A little  Basic Mobility - Total Score: 21        Goals:    Education Documentation  No documentation found.  Education Comments  No comments found.               [1]   Patient Active Problem List  Diagnosis    Influenza A    ULI (acute kidney injury)    Chest pain, unspecified type    Paroxysmal atrial fibrillation (Multi)    Hypoxia    Intractable nausea and vomiting

## 2025-05-02 NOTE — DISCHARGE SUMMARY
Discharge Diagnosis  Symptomatic bradycardia       Issues Requiring Follow-Up  Symptomatic bradycardia - s/p pacemaker placement on 5/1, patient to continue metoprolol succinate 25 mg nightly, clear instructions given to discontinue metoprolol tartrate, told to follow-up with cardiology (Dr. Villeda)  Low bicarbonate - given 1 week of sodium bicarbonate tablets to be taken at home, repeat RFP ordered for 1 week from day of discharge, to follow-up with PCP for further recommendations    Discharge Meds     Medication List      PAUSE taking these medications     aspirin 81 mg EC tablet; Wait to take this until: May 8, 2025     START taking these medications     cefadroxil 500 mg capsule; Commonly known as: Duricef; Take 1 capsule   (500 mg) by mouth once every 24 hours for 5 doses.; Start taking on: May   3, 2025   sodium bicarbonate 650 mg tablet; Take 1 tablet (650 mg) by mouth 2   times a day for 6 days.     CONTINUE taking these medications     amLODIPine 5 mg tablet; Commonly known as: Norvasc; TAKE 1 TABLET ONE   TIME DAILY   apixaban 2.5 mg tablet; Commonly known as: Eliquis; Take 1 tablet (2.5   mg) by mouth every 12 hours.   Breo Ellipta 200-25 mcg/dose inhaler; Generic drug: fluticasone   furoate-vilanteroL   calcitriol 0.25 mcg capsule; Commonly known as: Rocaltrol   dicyclomine 10 mg capsule; Commonly known as: Bentyl   dronedarone 400 mg tablet; Commonly known as: Multaq; Take 1 tablet (400   mg) by mouth 2 times a day.   meclizine 25 mg tablet; Commonly known as: Antivert   metoprolol succinate XL 25 mg 24 hr tablet; Commonly known as:   Toprol-XL; Take 1 tablet (25 mg) by mouth once daily at bedtime. Do not   crush or chew.   pantoprazole 40 mg EC tablet; Commonly known as: ProtoNix   potassium chloride CR 20 mEq ER tablet; Commonly known as: Klor-Con M20   rosuvastatin 5 mg tablet; Commonly known as: Crestor   tiZANidine 2 mg tablet; Commonly known as: Zanaflex     STOP taking these medications      metoprolol tartrate 25 mg tablet; Commonly known as: Lopressor       Test Results Pending At Discharge  Pending Labs       No current pending labs.            Hospital Course  Norma Villasenor is an 84-year-old female who presented to [facility] ED on 5/1 with primary complaint of feeling weak and unwell.  She reports that she became very dizzy and short of breath while walking to the bathroom that morning, as well as lightheaded, though she did not lose consciousness or fall.  Given the symptoms, she did reach out to her son (who she lives with) was brought to the emergency room.  In the emergency room she was found to be bradycardic with heart rates in the 30s.  She was also found to have an ULI.    Patient is ultimately evaluated by electrophysiology, who took her for pacemaker placement on the day of admission.  Procedure was uncomplicated.  Medications were adjusted, the patient was feeling better after the procedure.  She was evaluated by PT/OT.  It was found that she was taking both metoprolol succinate and metoprolol tartrate daily, thus medications were adjusted.    Patient was deemed medically stable for discharge on 5/2/2025.  She was instructed to follow-up with her PCP within 7 days of discharge for hospital follow-up.  She should follow-up with her cardiologist, Dr. Villeda, within 2 weeks of discharge for hospital follow-up.  She was instructed to continue her metoprolol succinate 25 mg nightly, and very clear instructions were given to discontinue the metoprolol tartrate.  She can resume Eliquis on the night of discharge.  She should hold aspirin for 1 week after the procedure.  She was also given a prescription for antibiotics to be taken for 1 week for surgical prophylaxis.  Finally, she was found to have low bicarbonate while in the hospital, thus was given a week of sodium bicarbonate tablets.  She should get a repeat RFP in 1 week and then discontinue this medication after following up with her  PCP.      Pertinent Physical Exam At Time of Discharge  Physical Exam  General: Not in acute distress, A&O x3, alert, coopertive, well-developed; left upper extremity in sling s/p procedure  HEENT: Normocephalic, atraumatic, EOMI, moist mucous membranes  Neck: Neck supple, trachea midline, no evidence of trauma  Cardiovascular: RRR, S1 and S2 appreciated, no murmurs rubs gallops appreciated, distal pulses 2+ bilaterally (radial and dorsalis pedis)  Respiratory: Clear to auscultation bilaterally without wheezes, rales, rhonchi; no increased work of breathing noted  GI: Abdomen soft, nondistended, nontender to palpation, bowel sounds present  Extremities: No edema appreciated in lower extremities bilaterally, no cyanosis  Neuro: A&O x3, no focal deficits, strength and sensation intact bilaterally  Skin: Warm and dry, without lesions or rashes      Outpatient Follow-Up  Future Appointments   Date Time Provider Department Center   5/8/2025  1:30 PM CARDIOLOGY DO [id number] CARD1 NURSE [id number] [facility]   5/9/2025  1:00 PM JOCE FAM ECG/HOLTER [id number] [facility]   7/7/2025  8:20 AM Lexa Villeda MD [id number] [facility]   7/25/2025  2:00 PM [facility] [id number] [facility]   8/1/2025  1:30 PM Lucia Varma MD [id number] [facility]         Roro Barone DO  Internal Medicine PGY-1 Resident

## 2025-05-02 NOTE — HOSPITAL COURSE
Norma Villasenor is an 84-year-old female who presented to [facility] ED on 5/1 with primary complaint of feeling weak and unwell.  She reports that she became very dizzy and short of breath while walking to the bathroom that morning, as well as lightheaded, though she did not lose consciousness or fall.  Given the symptoms, she did reach out to her son (who she lives with) was brought to the emergency room.  In the emergency room she was found to be bradycardic with heart rates in the 30s.  She was also found to have an ULI.    Patient is ultimately evaluated by electrophysiology, who took her for pacemaker placement on the day of admission.  Procedure was uncomplicated.  Medications were adjusted, the patient was feeling better after the procedure.  She was evaluated by PT/OT.  It was found that she was taking both metoprolol succinate and metoprolol tartrate daily, thus medications were adjusted.    Patient was deemed medically stable for discharge on 5/2/2025.  She was instructed to follow-up with her PCP within 7 days of discharge for hospital follow-up.  She should follow-up with her cardiologist, Dr. Villeda, within 2 weeks of discharge for hospital follow-up.  She was instructed to continue her metoprolol succinate 25 mg nightly, and very clear instructions were given to discontinue the metoprolol tartrate.  She can resume Eliquis on the night of discharge.  She should hold aspirin for 1 week after the procedure.  She was also given a prescription for antibiotics to be taken for 1 week for surgical prophylaxis.  Finally, she was found to have low bicarbonate while in the hospital, thus was given a week of sodium bicarbonate tablets.  She should get a repeat RFP in 1 week and then discontinue this medication after following up with her PCP.

## 2025-05-02 NOTE — NURSING NOTE
No issues overnight. The pt denied having any cp, sob, dizziness, nausea or n/t x4 extremities. The lt chest dsg is dry and intact. The arm is immobilized with a sling. An ice pack was maintained to the lt shoulder clavicular region. The pt denied the need for any pain meds. Slept well. Her lt radial pulses remain 2+; Her fingers pink, warm and mobile. The pt was gotten oob to the bsc with the assist of a walker. Her gait was slow but steady. The pt remained asymptomatic. The pt slept well. Resps were even and unlabored on ra; Diminished but clear in bll. Equal chest symmetry observed with resps . On ra. O2 sats remained > 94%. The pt EKG was NSR. No pacer spikes were seen on the pts EKG monitor all shift. Her heartrate was > 65 bts/min and no vent ectopy was noted. The pt has no peripheral edema. Had a x1 lg loose stool via the bsc.   The pt is able to voice her needs easily. hAD GOOD URINE OUTPUT. Am care was given and her linens were changed.  She stated she was hungry for bkft. No signs of distress observed overnight. The pt needs an a/p lateral cxr this am. Teaching was consulted last pm regarding the resumption of the pts home meds.Deferred the pts eliquis to address with EP for this am.

## 2025-05-02 NOTE — PROGRESS NOTES
05/02/25 1203   Discharge Planning   Living Arrangements Family members   Support Systems Family members   Type of Residence Private residence   Number of Stairs to Enter Residence 4   Number of Stairs Within Residence 10  (split level home, bedroom on upper level)   Home or Post Acute Services In home services   Type of Home Care Services Home OT;Home PT   Expected Discharge Disposition Home Health   Does the patient need discharge transport arranged? Yes   RoundTrip coordination needed? Yes   Financial Resource Strain   How hard is it for you to pay for the very basics like food, housing, medical care, and heating? Not hard   Patient Choice   Patient / Family choosing to utilize agency / facility established prior to hospitalization Yes  (resuming [facility])   Stroke Family Assessment   Stroke Family Assessment Needed No   Intensity of Service   Intensity of Service 0-30 min     Met with pt to discuss her dc plan. Pt lives at home with family. Home is a split level with several steps to get into the home. The pt's bedroom is on the upper floor. Pt states she does alright going up/down the steps. Owns a cane and walker, prefers to use the cane. Currently  has a splint to the left arm. Therapy evals pending. Pt is active with [facility] and would like to resume upon dc. Unsure of PCP name, unable to find in epic. Prescriptions are filled at [facility] with no barriers noted. Denies difficulty with living expenses such as utilities/groceries/prescriptions. Has a ride home upon dc.     [address] [facility] 2 intake nurse DARI Bernardo notified of dc.

## 2025-05-02 NOTE — DISCHARGE INSTRUCTIONS
You were seen in the hospital for symptomatic bradycardia, or a slow heart rate that was causing you to feel fatigued and dizzy.  A pacemaker was placed which should prevent any further episodes of bradycardia, and your medications were adjusted.    Please follow up with your primary care provider within 7 days for hospital follow-up. Please call to make this appointment.  Please follow-up with your cardiologist, Dr. Villeda, within a few weeks of hospital discharge.  Please call to make this appointment.    Please take your medications as prescribed.  Please see your after visit summary for further clarification on your medications.  PLEASE STOP TAKING metoprolol tartrate.   PLEASE CONTINUE TAKING metoprolol succinate 25 mg nightly.   Please resume your Eliquis tonight.  Please do not take your aspirin until 5/8. You may resume this daily medication on 5/8.   We have started you on an antibiotic to be taken once daily for an additional 5 days. Your last dose of this medication will be 5/7.  Finally, we have given you a prescription for sodium bicarbonate tablets, as you had low bicarbonate while in the hospital. You should take this twice a day for a week. We would then like you to have repeat lab work in one week. We have ordered labs to be done on 5/8. You should follow up with your PCP to discuss these results.     Please remain in the sling for the next two to three days. As you become more comfortable, you can wean out of the sling, but you should still limit strenuous use of your left upper extremity and avoid lifting with that arm.     If you have any concerning symptoms, or worsening symptoms please call or return, such as chest pain, shortness of breath, new onset confusion, loss of sensation, or fever >103F.    Thank you for allowing us to participate in your medical care!    -[facility] inpatient medicine teaching service

## 2025-05-04 ENCOUNTER — PATIENT OUTREACH (OUTPATIENT)
Dept: CARE COORDINATION | Age: 85
End: 2025-05-04
Payer: MEDICARE

## 2025-05-07 LAB
ATRIAL RATE: 35 BPM
ATRIAL RATE: 37 BPM
P AXIS: -15 DEGREES
P AXIS: 21 DEGREES
P OFFSET: 202 MS
P OFFSET: 207 MS
P ONSET: 157 MS
P ONSET: 158 MS
PR INTERVAL: 130 MS
PR INTERVAL: 132 MS
Q ONSET: 222 MS
Q ONSET: 224 MS
QRS COUNT: 6 BEATS
QRS COUNT: 7 BEATS
QRS DURATION: 84 MS
QRS DURATION: 84 MS
QT INTERVAL: 550 MS
QT INTERVAL: 558 MS
QTC CALCULATION(BAZETT): 425 MS
QTC CALCULATION(BAZETT): 431 MS
QTC FREDERICIA: 465 MS
QTC FREDERICIA: 468 MS
R AXIS: 55 DEGREES
R AXIS: 61 DEGREES
T AXIS: 54 DEGREES
T AXIS: 60 DEGREES
T OFFSET: 497 MS
T OFFSET: 503 MS
VENTRICULAR RATE: 35 BPM
VENTRICULAR RATE: 37 BPM

## 2025-05-08 ENCOUNTER — APPOINTMENT (OUTPATIENT)
Dept: CARDIOLOGY | Facility: CLINIC | Age: 85
End: 2025-05-08
Payer: MEDICARE

## 2025-05-08 ENCOUNTER — TELEPHONE (OUTPATIENT)
Dept: HOME HEALTH SERVICES | Facility: HOME HEALTH | Age: 85
End: 2025-05-08

## 2025-05-08 DIAGNOSIS — R00.1 SYMPTOMATIC BRADYCARDIA: ICD-10-CM

## 2025-05-08 NOTE — TELEPHONE ENCOUNTER
Hello,    Your recent home care referral for Norma Villasenor has been made a Non Admit with  Home Care due to Inability to Contact Patient. Staff has called all available numbers and contacts for this patient over a 3-4 day period, left messages, and received no return call. If you have further questions, feel free to reach out to our office at [phone].     Thank you,   [facility]

## 2025-05-09 ENCOUNTER — CLINICAL SUPPORT (OUTPATIENT)
Dept: CARDIOLOGY | Facility: CLINIC | Age: 85
End: 2025-05-09
Payer: MEDICARE

## 2025-05-09 ENCOUNTER — APPOINTMENT (OUTPATIENT)
Dept: CARDIOLOGY | Facility: CLINIC | Age: 85
End: 2025-05-09
Payer: MEDICARE

## 2025-05-09 NOTE — PROGRESS NOTES
Patient seen in office today for wound check s/p pacemaker implantation on 5/1. Dressing was removed without difficulty, and patient tolerated well. Incision is well-approximated. No drainage, redness, swelling, or ecchymosis noted. Patient is accompanied by her son.    Patient denies dizziness, loss of consciousness, palpitations, or chest pain.     Patient advised to gently wash incision with mild soap and warm water, to not allow water to directly hit the incision, and to pat area dry. Advised patient to not use creams or lotions on the incision, and leave it open to air.     Patient advised to call Dr. Varma's office if any redness, swelling, drainage, bruising, or increased pain is noted. Patient verbalized understanding.

## 2025-06-13 ENCOUNTER — HOSPITAL ENCOUNTER (OUTPATIENT)
Dept: CARDIOLOGY | Facility: HOSPITAL | Age: 85
Discharge: HOME | End: 2025-06-13
Payer: MEDICARE

## 2025-06-13 DIAGNOSIS — R00.1 BRADYCARDIA: ICD-10-CM

## 2025-06-13 DIAGNOSIS — Z95.0 CARDIAC PACEMAKER IN SITU: ICD-10-CM

## 2025-07-07 ENCOUNTER — APPOINTMENT (OUTPATIENT)
Dept: CARDIOLOGY | Facility: CLINIC | Age: 85
End: 2025-07-07
Payer: MEDICARE

## 2025-07-16 ENCOUNTER — APPOINTMENT (OUTPATIENT)
Dept: PRIMARY CARE | Facility: CLINIC | Age: 85
End: 2025-07-16
Payer: MEDICARE

## 2025-07-17 ENCOUNTER — APPOINTMENT (OUTPATIENT)
Dept: PRIMARY CARE | Facility: CLINIC | Age: 85
End: 2025-07-17
Payer: MEDICARE

## 2025-07-21 DIAGNOSIS — R00.1 BRADYCARDIA ON ECG: ICD-10-CM

## 2025-07-21 DIAGNOSIS — Z95.0 PACEMAKER: Primary | ICD-10-CM

## 2025-07-25 ENCOUNTER — APPOINTMENT (OUTPATIENT)
Dept: CARDIOLOGY | Facility: CLINIC | Age: 85
End: 2025-07-25
Payer: MEDICARE

## 2025-08-01 ENCOUNTER — APPOINTMENT (OUTPATIENT)
Dept: CARDIOLOGY | Facility: CLINIC | Age: 85
End: 2025-08-01
Payer: MEDICARE

## 2025-08-05 ENCOUNTER — APPOINTMENT (OUTPATIENT)
Dept: CARDIOLOGY | Facility: CLINIC | Age: 85
End: 2025-08-05
Payer: MEDICARE

## 2025-08-15 ENCOUNTER — HOSPITAL ENCOUNTER (OUTPATIENT)
Dept: RADIOLOGY | Facility: HOSPITAL | Age: 85
Discharge: HOME | End: 2025-08-15
Payer: MEDICARE

## 2025-08-15 DIAGNOSIS — M25.571 PAIN IN RIGHT ANKLE AND JOINTS OF RIGHT FOOT: ICD-10-CM

## 2025-08-15 PROCEDURE — 73610 X-RAY EXAM OF ANKLE: CPT | Mod: RT

## (undated) DEVICE — ELECTRODE, QUICK-COMBO, REDI PACK

## (undated) DEVICE — Device

## (undated) DEVICE — INTRODUCER SYSTEM, PRELUDE SNAP, SPLITTABLE, HEMOSTATIC, 6FR

## (undated) DEVICE — SUTURE, STRATAFIX, 1 PDO CTX 30 X 30CM, 1/2 36MM

## (undated) DEVICE — SOLUTION, IRRIGATION, SODIUM CHLORIDE 0.9%, 1000 ML, POUR BOTTLE

## (undated) DEVICE — SUTURE, VICRYL 2-0, TAPER POINT, CT-1 UNDYED 27 INCH

## (undated) DEVICE — SKIN CLOSURE SYS, PREMIERPRO EXOFIN, 1-4CM X 22CM, 1.75G TUBE

## (undated) DEVICE — DRESSING, AQUACEL AG, HYDROFIBER W/SILVER, 3.5 X 6 IN

## (undated) DEVICE — SOLUTION, IRRIGATION, STERILE WATER, 1000 ML, POUR BOTTLE

## (undated) DEVICE — TOWEL PACK, STERILE, 4/PACK, BLUE

## (undated) DEVICE — DRESSING, MEPILEX BORDER, POST-OP AG, 4 X 12 IN

## (undated) DEVICE — SUTURE, ETHIBOND XTRA, 1, 30 IN, CTX, LF

## (undated) DEVICE — BANDAGE, COFLEX, 6 X 5 YDS, FOAM TAN, STERILE, LF

## (undated) DEVICE — SUTURE, STARTAFIX, SYMMETRIC, PDS PLUS, 60CM CT-1

## (undated) DEVICE — BANDAGE, COMPRESSION, W/CLIP, FLEX-MASTER, DOUBLE LENGTH, 6 IN X 11 YD, LF

## (undated) DEVICE — BLADE, SAW, SAGITTAL, 25.0 X 1.27 X 90MM

## (undated) DEVICE — SUTURE, VICRYL, 4-0, 27 IN, FS-2, UNDYED

## (undated) DEVICE — SUTURE, ETHIBOND, XTRA, 30 IN, 0, CT-1, GREEN

## (undated) DEVICE — HEMOSTAT, ARISTA, ABSORBABLE, 3 GRAMS

## (undated) DEVICE — HOOD, STERISHIELD T4 SYSTEM

## (undated) DEVICE — SUTURE, VICRYL, 2-0, 27 IN, CP-1, UNDYED

## (undated) DEVICE — SUTURE, MONOCRYL, 3-0, 18 IN, PS2, UNDYED

## (undated) DEVICE — SUTURE, VICRYL, 3-0, 27 IN, SH, VIOLET

## (undated) DEVICE — DRAPE, INSTRUMENT, W/POUCH, STERI DRAPE, 7 X 11 IN, DISPOSABLE, STERILE

## (undated) DEVICE — DRAINAGE SET, CLOSED WOUND, MED-LG, PVC, 3/16 IN, DAVOL, 15 FR, 400 CC

## (undated) DEVICE — COVER, EQUIPMENT, DOME COVER, SNAP CAP, 30 IN, CLEAR, LF

## (undated) DEVICE — GOWN, ASTOUND, XL

## (undated) DEVICE — CAUTERY, PENCIL, PUSH BUTTON, SMOKE EVAC, 70MM

## (undated) DEVICE — GLOVE, SURGICAL, PROTEXIS PI MICRO, 8.0, PF, LF

## (undated) DEVICE — ADHESIVE, SKIN, DERMABOND ADVANCED, 15CM, PEN-STYLE

## (undated) DEVICE — SKIN CLOSURE SYS, PREMIERPRO EXOFIN, 2-4CM X 30CM, 1.75G TUBE

## (undated) DEVICE — CABLE, SURGICAL, DISP